# Patient Record
Sex: FEMALE | Race: WHITE | Employment: UNEMPLOYED
[De-identification: names, ages, dates, MRNs, and addresses within clinical notes are randomized per-mention and may not be internally consistent; named-entity substitution may affect disease eponyms.]

---

## 2017-01-24 ENCOUNTER — OFFICE VISIT (OUTPATIENT)
Dept: FAMILY MEDICINE CLINIC | Facility: CLINIC | Age: 52
End: 2017-01-24

## 2017-01-24 VITALS
HEART RATE: 99 BPM | WEIGHT: 159.4 LBS | HEIGHT: 62 IN | DIASTOLIC BLOOD PRESSURE: 65 MMHG | SYSTOLIC BLOOD PRESSURE: 100 MMHG | BODY MASS INDEX: 29.33 KG/M2

## 2017-01-24 DIAGNOSIS — Z72.0 TOBACCO ABUSE: ICD-10-CM

## 2017-01-24 PROCEDURE — 99213 OFFICE O/P EST LOW 20 MIN: CPT | Performed by: FAMILY MEDICINE

## 2017-01-24 RX ORDER — CYCLOBENZAPRINE HCL 5 MG
5 TABLET ORAL 3 TIMES DAILY PRN
Qty: 30 TABLET | Refills: 3 | Status: SHIPPED | OUTPATIENT
Start: 2017-01-24 | End: 2018-01-18 | Stop reason: SDUPTHER

## 2017-01-24 RX ORDER — VALSARTAN AND HYDROCHLOROTHIAZIDE 80; 12.5 MG/1; MG/1
1 TABLET, FILM COATED ORAL DAILY
COMMUNITY
End: 2017-04-07

## 2017-01-24 RX ORDER — ALBUTEROL SULFATE 90 UG/1
2 AEROSOL, METERED RESPIRATORY (INHALATION) 4 TIMES DAILY PRN
Qty: 1 INHALER | Refills: 3 | Status: SHIPPED | OUTPATIENT
Start: 2017-01-24 | End: 2017-07-26 | Stop reason: SDUPTHER

## 2017-01-24 RX ORDER — CYCLOBENZAPRINE HCL 5 MG
5 TABLET ORAL 3 TIMES DAILY PRN
COMMUNITY
End: 2017-01-24 | Stop reason: SDUPTHER

## 2017-02-16 DIAGNOSIS — R51.9 INTRACTABLE HEADACHE, UNSPECIFIED CHRONICITY PATTERN, UNSPECIFIED HEADACHE TYPE: ICD-10-CM

## 2017-02-17 RX ORDER — ACETAMINOPHEN AND CODEINE PHOSPHATE 300; 30 MG/1; MG/1
TABLET ORAL
Qty: 30 TABLET | Refills: 0 | Status: SHIPPED | OUTPATIENT
Start: 2017-02-17 | End: 2017-04-07 | Stop reason: SDUPTHER

## 2017-03-21 ENCOUNTER — OFFICE VISIT (OUTPATIENT)
Dept: FAMILY MEDICINE CLINIC | Age: 52
End: 2017-03-21
Payer: COMMERCIAL

## 2017-03-21 VITALS
OXYGEN SATURATION: 97 % | WEIGHT: 164.4 LBS | HEART RATE: 112 BPM | BODY MASS INDEX: 30.25 KG/M2 | SYSTOLIC BLOOD PRESSURE: 130 MMHG | RESPIRATION RATE: 14 BRPM | HEIGHT: 62 IN | TEMPERATURE: 98.3 F | DIASTOLIC BLOOD PRESSURE: 83 MMHG

## 2017-03-21 DIAGNOSIS — J32.1 CHRONIC FRONTAL SINUSITIS: Primary | ICD-10-CM

## 2017-03-21 DIAGNOSIS — Z72.0 TOBACCO ABUSE: ICD-10-CM

## 2017-03-21 PROCEDURE — 99214 OFFICE O/P EST MOD 30 MIN: CPT | Performed by: FAMILY MEDICINE

## 2017-03-21 RX ORDER — CLARITHROMYCIN 500 MG/1
500 TABLET, COATED ORAL 2 TIMES DAILY
Qty: 20 TABLET | Refills: 0 | Status: SHIPPED | OUTPATIENT
Start: 2017-03-21 | End: 2017-03-31

## 2017-03-21 RX ORDER — PREDNISONE 20 MG/1
40 TABLET ORAL DAILY
Qty: 10 TABLET | Refills: 0 | Status: SHIPPED | OUTPATIENT
Start: 2017-03-21 | End: 2017-03-26

## 2017-04-06 ENCOUNTER — OFFICE VISIT (OUTPATIENT)
Dept: FAMILY MEDICINE CLINIC | Age: 52
End: 2017-04-06
Payer: COMMERCIAL

## 2017-04-06 VITALS
BODY MASS INDEX: 30.36 KG/M2 | HEIGHT: 62 IN | SYSTOLIC BLOOD PRESSURE: 111 MMHG | HEART RATE: 103 BPM | RESPIRATION RATE: 20 BRPM | DIASTOLIC BLOOD PRESSURE: 73 MMHG | OXYGEN SATURATION: 99 % | WEIGHT: 165 LBS

## 2017-04-06 DIAGNOSIS — I10 ESSENTIAL HYPERTENSION: Primary | ICD-10-CM

## 2017-04-06 DIAGNOSIS — R00.0 TACHYCARDIA: ICD-10-CM

## 2017-04-06 DIAGNOSIS — R09.81 NASAL CONGESTION: ICD-10-CM

## 2017-04-06 PROCEDURE — 99214 OFFICE O/P EST MOD 30 MIN: CPT | Performed by: FAMILY MEDICINE

## 2017-04-06 PROCEDURE — 93000 ELECTROCARDIOGRAM COMPLETE: CPT | Performed by: FAMILY MEDICINE

## 2017-04-06 RX ORDER — MONTELUKAST SODIUM 10 MG/1
10 TABLET ORAL NIGHTLY
Qty: 30 TABLET | Refills: 3 | Status: SHIPPED | OUTPATIENT
Start: 2017-04-06 | End: 2017-07-26 | Stop reason: SDUPTHER

## 2017-04-06 RX ORDER — METOPROLOL SUCCINATE 25 MG/1
25 TABLET, EXTENDED RELEASE ORAL DAILY
Qty: 30 TABLET | Refills: 3 | Status: SHIPPED | OUTPATIENT
Start: 2017-04-06 | End: 2017-07-26

## 2017-04-07 DIAGNOSIS — I10 ESSENTIAL HYPERTENSION: ICD-10-CM

## 2017-04-07 DIAGNOSIS — R51.9 INTRACTABLE HEADACHE, UNSPECIFIED CHRONICITY PATTERN, UNSPECIFIED HEADACHE TYPE: ICD-10-CM

## 2017-04-07 RX ORDER — VALSARTAN AND HYDROCHLOROTHIAZIDE 80; 12.5 MG/1; MG/1
TABLET, FILM COATED ORAL
Qty: 30 TABLET | Refills: 2 | Status: SHIPPED | OUTPATIENT
Start: 2017-04-07 | End: 2017-07-21 | Stop reason: SDUPTHER

## 2017-04-10 RX ORDER — ACETAMINOPHEN AND CODEINE PHOSPHATE 300; 30 MG/1; MG/1
TABLET ORAL
Qty: 30 TABLET | Refills: 0 | Status: SHIPPED | OUTPATIENT
Start: 2017-04-10 | End: 2017-05-31 | Stop reason: SDUPTHER

## 2017-04-18 RX ORDER — ATORVASTATIN CALCIUM 10 MG/1
TABLET, FILM COATED ORAL
Qty: 30 TABLET | Refills: 2 | Status: SHIPPED | OUTPATIENT
Start: 2017-04-18 | End: 2017-09-19 | Stop reason: SDUPTHER

## 2017-05-31 DIAGNOSIS — R51.9 INTRACTABLE HEADACHE, UNSPECIFIED CHRONICITY PATTERN, UNSPECIFIED HEADACHE TYPE: ICD-10-CM

## 2017-05-31 RX ORDER — ACETAMINOPHEN AND CODEINE PHOSPHATE 300; 30 MG/1; MG/1
TABLET ORAL
Qty: 30 TABLET | Refills: 0 | Status: SHIPPED | OUTPATIENT
Start: 2017-05-31 | End: 2017-07-26 | Stop reason: SDUPTHER

## 2017-07-21 DIAGNOSIS — I10 ESSENTIAL HYPERTENSION: ICD-10-CM

## 2017-07-21 RX ORDER — VALSARTAN AND HYDROCHLOROTHIAZIDE 80; 12.5 MG/1; MG/1
1 TABLET, FILM COATED ORAL DAILY
Qty: 30 TABLET | Refills: 3 | Status: SHIPPED | OUTPATIENT
Start: 2017-07-21 | End: 2017-11-23 | Stop reason: SDUPTHER

## 2017-07-26 ENCOUNTER — OFFICE VISIT (OUTPATIENT)
Dept: FAMILY MEDICINE CLINIC | Age: 52
End: 2017-07-26
Payer: COMMERCIAL

## 2017-07-26 VITALS
HEIGHT: 62 IN | SYSTOLIC BLOOD PRESSURE: 121 MMHG | WEIGHT: 159.2 LBS | BODY MASS INDEX: 29.3 KG/M2 | OXYGEN SATURATION: 98 % | RESPIRATION RATE: 12 BRPM | HEART RATE: 93 BPM | DIASTOLIC BLOOD PRESSURE: 77 MMHG

## 2017-07-26 DIAGNOSIS — R51.9 INTRACTABLE HEADACHE, UNSPECIFIED CHRONICITY PATTERN, UNSPECIFIED HEADACHE TYPE: ICD-10-CM

## 2017-07-26 DIAGNOSIS — Z72.0 TOBACCO ABUSE: ICD-10-CM

## 2017-07-26 DIAGNOSIS — I10 ESSENTIAL HYPERTENSION: Primary | ICD-10-CM

## 2017-07-26 DIAGNOSIS — R09.81 NASAL CONGESTION: ICD-10-CM

## 2017-07-26 PROCEDURE — 99214 OFFICE O/P EST MOD 30 MIN: CPT | Performed by: FAMILY MEDICINE

## 2017-07-26 RX ORDER — ALBUTEROL SULFATE 90 UG/1
2 AEROSOL, METERED RESPIRATORY (INHALATION) 4 TIMES DAILY PRN
Qty: 1 INHALER | Refills: 3 | Status: SHIPPED | OUTPATIENT
Start: 2017-07-26 | End: 2018-08-09 | Stop reason: SDUPTHER

## 2017-07-26 RX ORDER — MONTELUKAST SODIUM 10 MG/1
10 TABLET ORAL NIGHTLY
Qty: 30 TABLET | Refills: 3 | Status: SHIPPED | OUTPATIENT
Start: 2017-07-26 | End: 2017-12-26 | Stop reason: SDUPTHER

## 2017-07-26 RX ORDER — ACETAMINOPHEN AND CODEINE PHOSPHATE 300; 30 MG/1; MG/1
TABLET ORAL
Qty: 30 TABLET | Refills: 0 | Status: SHIPPED | OUTPATIENT
Start: 2017-07-26 | End: 2017-09-19 | Stop reason: SDUPTHER

## 2017-09-19 DIAGNOSIS — R51.9 INTRACTABLE HEADACHE, UNSPECIFIED CHRONICITY PATTERN, UNSPECIFIED HEADACHE TYPE: ICD-10-CM

## 2017-09-20 RX ORDER — ACETAMINOPHEN AND CODEINE PHOSPHATE 300; 30 MG/1; MG/1
TABLET ORAL
Qty: 30 TABLET | Refills: 0 | Status: SHIPPED | OUTPATIENT
Start: 2017-09-20 | End: 2017-11-23 | Stop reason: SDUPTHER

## 2017-12-26 DIAGNOSIS — R09.81 NASAL CONGESTION: ICD-10-CM

## 2017-12-26 RX ORDER — MONTELUKAST SODIUM 10 MG/1
TABLET ORAL
Qty: 30 TABLET | Refills: 3 | Status: SHIPPED | OUTPATIENT
Start: 2017-12-26 | End: 2018-05-05 | Stop reason: SDUPTHER

## 2018-01-15 RX ORDER — ATORVASTATIN CALCIUM 10 MG/1
TABLET, FILM COATED ORAL
Qty: 90 TABLET | Refills: 2 | Status: SHIPPED | OUTPATIENT
Start: 2018-01-15 | End: 2019-01-08 | Stop reason: SDUPTHER

## 2018-01-18 ENCOUNTER — OFFICE VISIT (OUTPATIENT)
Dept: FAMILY MEDICINE CLINIC | Age: 53
End: 2018-01-18
Payer: COMMERCIAL

## 2018-01-18 VITALS
WEIGHT: 168.8 LBS | SYSTOLIC BLOOD PRESSURE: 112 MMHG | HEIGHT: 62 IN | RESPIRATION RATE: 16 BRPM | DIASTOLIC BLOOD PRESSURE: 70 MMHG | HEART RATE: 98 BPM | BODY MASS INDEX: 31.06 KG/M2 | OXYGEN SATURATION: 100 %

## 2018-01-18 DIAGNOSIS — R10.32 LEFT LOWER QUADRANT ABDOMINAL PAIN OF UNKNOWN ETIOLOGY: Primary | ICD-10-CM

## 2018-01-18 DIAGNOSIS — R51.9 INTRACTABLE HEADACHE, UNSPECIFIED CHRONICITY PATTERN, UNSPECIFIED HEADACHE TYPE: ICD-10-CM

## 2018-01-18 PROCEDURE — 99213 OFFICE O/P EST LOW 20 MIN: CPT | Performed by: FAMILY MEDICINE

## 2018-01-18 RX ORDER — ACETAMINOPHEN AND CODEINE PHOSPHATE 300; 30 MG/1; MG/1
TABLET ORAL
Qty: 20 TABLET | Refills: 0 | Status: SHIPPED | OUTPATIENT
Start: 2018-01-18 | End: 2018-01-18 | Stop reason: CLARIF

## 2018-01-18 RX ORDER — CYCLOBENZAPRINE HCL 5 MG
5 TABLET ORAL 3 TIMES DAILY PRN
Qty: 30 TABLET | Refills: 3 | Status: SHIPPED | OUTPATIENT
Start: 2018-01-18 | End: 2018-11-08 | Stop reason: ALTCHOICE

## 2018-01-18 ASSESSMENT — PATIENT HEALTH QUESTIONNAIRE - PHQ9
SUM OF ALL RESPONSES TO PHQ QUESTIONS 1-9: 0
2. FEELING DOWN, DEPRESSED OR HOPELESS: 0
SUM OF ALL RESPONSES TO PHQ9 QUESTIONS 1 & 2: 0
1. LITTLE INTEREST OR PLEASURE IN DOING THINGS: 0

## 2018-01-19 ENCOUNTER — HOSPITAL ENCOUNTER (OUTPATIENT)
Dept: ULTRASOUND IMAGING | Age: 53
Discharge: HOME | End: 2018-01-19
Payer: COMMERCIAL

## 2018-01-19 DIAGNOSIS — R10.32 LEFT LOWER QUADRANT ABDOMINAL PAIN OF UNKNOWN ETIOLOGY: ICD-10-CM

## 2018-01-19 PROCEDURE — 76856 US EXAM PELVIC COMPLETE: CPT

## 2018-01-24 ENCOUNTER — HOSPITAL ENCOUNTER (OUTPATIENT)
Age: 53
Setting detail: SPECIMEN
Discharge: HOME | End: 2018-01-24
Payer: COMMERCIAL

## 2018-01-24 ENCOUNTER — OFFICE VISIT (OUTPATIENT)
Dept: OBGYN CLINIC | Age: 53
End: 2018-01-24
Payer: COMMERCIAL

## 2018-01-24 VITALS
BODY MASS INDEX: 29.81 KG/M2 | HEIGHT: 63 IN | OXYGEN SATURATION: 99 % | SYSTOLIC BLOOD PRESSURE: 128 MMHG | DIASTOLIC BLOOD PRESSURE: 87 MMHG | WEIGHT: 168.25 LBS | HEART RATE: 107 BPM

## 2018-01-24 DIAGNOSIS — Z01.419 ENCOUNTER FOR ROUTINE GYNECOLOGICAL EXAMINATION WITH PAPANICOLAOU SMEAR OF CERVIX: Primary | ICD-10-CM

## 2018-01-24 DIAGNOSIS — Z12.31 ENCOUNTER FOR SCREENING MAMMOGRAM FOR BREAST CANCER: ICD-10-CM

## 2018-01-24 DIAGNOSIS — R31.29 MICROSCOPIC HEMATURIA: ICD-10-CM

## 2018-01-24 DIAGNOSIS — R10.30 LOWER ABDOMINAL PAIN: ICD-10-CM

## 2018-01-24 DIAGNOSIS — R10.32 LEFT LOWER QUADRANT PAIN: ICD-10-CM

## 2018-01-24 LAB
-: NORMAL
AMORPHOUS: NORMAL
BACTERIA: NORMAL
BILIRUBIN URINE: NEGATIVE
CASTS UA: NORMAL /LPF (ref 0–8)
COLOR: YELLOW
COMMENT UA: ABNORMAL
CRYSTALS, UA: NORMAL /HPF
EPITHELIAL CELLS UA: NORMAL /HPF (ref 0–5)
GLUCOSE URINE: NEGATIVE
KETONES, URINE: NEGATIVE
LEUKOCYTE ESTERASE, URINE: NEGATIVE
MUCUS: NORMAL
NITRITE, URINE: NEGATIVE
OTHER OBSERVATIONS UA: NORMAL
PH UA: 6.5 (ref 5–8)
PROTEIN UA: NEGATIVE
RBC UA: NORMAL /HPF (ref 0–4)
RENAL EPITHELIAL, UA: NORMAL /HPF
SPECIFIC GRAVITY UA: 1 (ref 1–1.03)
TRICHOMONAS: NORMAL
TURBIDITY: CLEAR
URINE HGB: ABNORMAL
UROBILINOGEN, URINE: NORMAL
WBC UA: NORMAL /HPF (ref 0–5)
YEAST: NORMAL

## 2018-01-24 PROCEDURE — 99214 OFFICE O/P EST MOD 30 MIN: CPT | Performed by: OBSTETRICS & GYNECOLOGY

## 2018-01-25 LAB
CULTURE: NORMAL
CULTURE: NORMAL
Lab: NORMAL
SPECIMEN DESCRIPTION: NORMAL
STATUS: NORMAL

## 2018-01-30 NOTE — PROGRESS NOTES
Essentially unremarkable exam.    Previous hysterectomy and right oophorectomy.  Left ovary was not visualized. Thank you.

## 2018-02-02 ENCOUNTER — OFFICE VISIT (OUTPATIENT)
Dept: FAMILY MEDICINE CLINIC | Age: 53
End: 2018-02-02
Payer: COMMERCIAL

## 2018-02-02 ENCOUNTER — HOSPITAL ENCOUNTER (OUTPATIENT)
Age: 53
Setting detail: SPECIMEN
Discharge: HOME | End: 2018-02-02
Payer: COMMERCIAL

## 2018-02-02 ENCOUNTER — HOSPITAL ENCOUNTER (OUTPATIENT)
Dept: CT IMAGING | Facility: CLINIC | Age: 53
Discharge: HOME | End: 2018-02-04
Payer: COMMERCIAL

## 2018-02-02 VITALS
WEIGHT: 167.7 LBS | OXYGEN SATURATION: 98 % | DIASTOLIC BLOOD PRESSURE: 74 MMHG | HEIGHT: 63 IN | BODY MASS INDEX: 29.71 KG/M2 | TEMPERATURE: 97.7 F | HEART RATE: 94 BPM | SYSTOLIC BLOOD PRESSURE: 118 MMHG

## 2018-02-02 DIAGNOSIS — R30.0 DYSURIA: Primary | ICD-10-CM

## 2018-02-02 DIAGNOSIS — R10.32 LEFT LOWER QUADRANT PAIN: ICD-10-CM

## 2018-02-02 DIAGNOSIS — R31.9 HEMATURIA, UNSPECIFIED TYPE: ICD-10-CM

## 2018-02-02 LAB
BILIRUBIN, POC: NORMAL
BLOOD URINE, POC: NORMAL
CLARITY, POC: CLEAR
COLOR, POC: NORMAL
GLUCOSE URINE, POC: NORMAL
KETONES, POC: NORMAL
LEUKOCYTE EST, POC: NORMAL
NITRITE, POC: NORMAL
PH, POC: 6
PROTEIN, POC: NORMAL
SPECIFIC GRAVITY, POC: 1.02
UROBILINOGEN, POC: 0.2

## 2018-02-02 PROCEDURE — 81003 URINALYSIS AUTO W/O SCOPE: CPT | Performed by: NURSE PRACTITIONER

## 2018-02-02 PROCEDURE — 74176 CT ABD & PELVIS W/O CONTRAST: CPT

## 2018-02-02 PROCEDURE — 99213 OFFICE O/P EST LOW 20 MIN: CPT | Performed by: NURSE PRACTITIONER

## 2018-02-02 RX ORDER — SODIUM, POTASSIUM,MAG SULFATES 17.5-3.13G
SOLUTION, RECONSTITUTED, ORAL ORAL
COMMUNITY
Start: 2018-01-31 | End: 2018-02-22

## 2018-02-02 RX ORDER — PHENAZOPYRIDINE HYDROCHLORIDE 100 MG/1
100 TABLET, FILM COATED ORAL 3 TIMES DAILY PRN
Qty: 6 TABLET | Refills: 0 | Status: SHIPPED | OUTPATIENT
Start: 2018-02-02 | End: 2018-02-22

## 2018-02-02 RX ORDER — CIPROFLOXACIN 500 MG/1
500 TABLET, FILM COATED ORAL 2 TIMES DAILY
Qty: 20 TABLET | Refills: 0 | Status: SHIPPED | OUTPATIENT
Start: 2018-02-02 | End: 2018-02-12

## 2018-02-02 ASSESSMENT — ENCOUNTER SYMPTOMS
SHORTNESS OF BREATH: 0
DIARRHEA: 0
COUGH: 0
VOMITING: 0
NAUSEA: 0
ABDOMINAL PAIN: 0
CHEST TIGHTNESS: 0
WHEEZING: 0

## 2018-02-02 NOTE — PROGRESS NOTES
[id number] [place] [id number]  Dept: [phone]  Dept Fax: [phone]    Jhonny Keane is a 46 y.o. female who presents to the walk in clinic today for her medical conditions/complaints as noted below. Jhonny Keane is c/o of Urinary Tract Infection (pressure today)    HPI:     Patient presents to the walk in clinic for an evaluation. Reports she thinks she might have a UTI that is causing pressure today. Has been having LLQ abdominal pain for approximately 2 weeks. Was seen by PCP on 1/18 a pelvic exam was done that day and an ultrasound of the pelvic was ordered. Pelvis ultrasound was normal. Patient was referred to [facility] [place] and saw them on 1/24. A 2nd pelvic exam was completed this day and a CT scan of the abdomen and pelvis, urinalysis, and urine culture was ordered. U/A showed trace blood, culture showed no bacterial growth. Ct of abdomen and pelvic was completed today, which showed no acute abnormality. Has been referred to urology for the hematuria and is schedule to see them on Tuesday 2/6/18. Urinary Tract Infection    This is a new problem. The current episode started today. The problem has been gradually worsening. The pain is at a severity of 10/10. There has been no fever. Sexually active: not currently. There is no history of pyelonephritis. Associated symptoms include chills, flank pain (left), frequency and urgency. Pertinent negatives include no discharge, hematuria, hesitancy, nausea, possible pregnancy, sweats or vomiting. She has tried nothing for the symptoms.      Past Medical History:   Diagnosis Date    Anxiety     Asthma     Headache     Hyperlipidemia     Hypertension       Current Outpatient Prescriptions   Medication Sig Dispense Refill    ciprofloxacin (CIPRO) 500 MG tablet Take 1 tablet by mouth 2 times daily for 10 days 20 tablet 0    phenazopyridine (PYRIDIUM) 100 MG tablet Take 1 tablet by mouth 3 times person, place, and time. She appears well-developed and well-nourished. No distress. Cardiovascular: Normal rate, regular rhythm and normal heart sounds. Pulmonary/Chest: Effort normal and breath sounds normal. No respiratory distress. She has no wheezes. Abdominal: Soft. There is tenderness in the suprapubic area. There is CVA tenderness (left). There is no rigidity, no rebound and no guarding. Patient extremely uncomfortable when suprapubic area and left flank area is palpated. Neurological: She is alert and oriented to person, place, and time. Skin: Skin is warm and dry. She is not diaphoretic. Nursing note and vitals reviewed. /74   Pulse 94   Temp 97.7 °F (36.5 °C) (Oral)   Ht 5' 3\" (1.6 m)   Wt 167 lb 11.2 oz (76.1 kg)   SpO2 98%   BMI 29.71 kg/m²     Results for orders placed or performed in visit on 02/02/18   POCT Urinalysis No Micro (Auto)   Result Value Ref Range    Color, UA dark yellow     Clarity, UA clear     Glucose, UA POC neg     Bilirubin, UA neg     Ketones, UA neg     Spec Grav, UA 1.020     Blood, UA POC small     pH, UA 6.0     Protein, UA POC neg     Urobilinogen, UA 0.2     Leukocytes, UA neg     Nitrite, UA neg      Assessment:      1. Dysuria  POCT Urinalysis No Micro (Auto)    Urine Culture    ciprofloxacin (CIPRO) 500 MG tablet    phenazopyridine (PYRIDIUM) 100 MG tablet   2. Hematuria, unspecified type  Urine Culture    ciprofloxacin (CIPRO) 500 MG tablet    phenazopyridine (PYRIDIUM) 100 MG tablet     Plan:      Return if symptoms worsen or fail to improve. Orders Placed This Encounter   Medications    ciprofloxacin (CIPRO) 500 MG tablet     Sig: Take 1 tablet by mouth 2 times daily for 10 days     Dispense:  20 tablet     Refill:  0    phenazopyridine (PYRIDIUM) 100 MG tablet     Sig: Take 1 tablet by mouth 3 times daily as needed for Pain     Dispense:  6 tablet     Refill:  0     In office urinalysis completed.  Results discussed with patient during the appointment. Urine will be sent for a culture. Further orders pending results. Patient will be notified of results. Given the patient has had 2 pelvic exams in the last 2 weeks and no changes in the symptoms I do not feel it is necessary to repeat the pelvic exam during this appointment. Discussed with patient at this point given the severe left flank pain lets go ahead and try an antibiotic for the symptoms and if the culture comes back with no bacterial growth we will discontinue the antibiotic. Patient agreeable to this plan. Cipro as directed. Pyridium as directed. Instructed pyridium or any OTC Azo type product cannot be used after Sunday night so it does not mess up any urine specimen the urologist might want to obtain on Tuesday. Patient verbalizes understanding. Plenty of fluids, instructed to avoid bladder irritants such as caffeine. Instructed to keep already schedule appointment with urology on Tuesday  Follow up with PCP or return to walk in clinic if symptoms do not improve or worsen. Patient given educational materials - see patient instructions. Discussed use, benefit, and side effects of prescribed medications. All patient questions answered. Pt voiced understanding.     Electronically signed by Payal Huggins CNP on 2/2/2018 at 2:09 PM

## 2018-02-06 LAB
CULTURE: NORMAL
CULTURE: NORMAL
Lab: NORMAL
SPECIMEN DESCRIPTION: NORMAL
STATUS: NORMAL

## 2018-02-13 ENCOUNTER — HOSPITAL ENCOUNTER (OUTPATIENT)
Dept: ULTRASOUND IMAGING | Age: 53
Discharge: HOME | End: 2018-02-15
Payer: COMMERCIAL

## 2018-02-13 ENCOUNTER — HOSPITAL ENCOUNTER (OUTPATIENT)
Age: 53
Discharge: HOME | End: 2018-02-13
Payer: COMMERCIAL

## 2018-02-13 DIAGNOSIS — Z01.811 PRE-OP CHEST EXAM: Primary | ICD-10-CM

## 2018-02-13 DIAGNOSIS — R31.29 OTHER MICROSCOPIC HEMATURIA: ICD-10-CM

## 2018-02-13 LAB
EKG ATRIAL RATE: 98 BPM
EKG P AXIS: 73 DEGREES
EKG P-R INTERVAL: 160 MS
EKG Q-T INTERVAL: 336 MS
EKG QRS DURATION: 76 MS
EKG QTC CALCULATION (BAZETT): 428 MS
EKG R AXIS: 78 DEGREES
EKG T AXIS: 62 DEGREES
EKG VENTRICULAR RATE: 98 BPM

## 2018-02-13 PROCEDURE — 93005 ELECTROCARDIOGRAM TRACING: CPT

## 2018-02-13 PROCEDURE — 76770 US EXAM ABDO BACK WALL COMP: CPT

## 2018-02-22 ENCOUNTER — HOSPITAL ENCOUNTER (EMERGENCY)
Age: 53
Discharge: HOME | End: 2018-02-22
Attending: EMERGENCY MEDICINE
Payer: COMMERCIAL

## 2018-02-22 ENCOUNTER — TELEPHONE (OUTPATIENT)
Dept: FAMILY MEDICINE CLINIC | Age: 53
End: 2018-02-22

## 2018-02-22 VITALS
OXYGEN SATURATION: 98 % | DIASTOLIC BLOOD PRESSURE: 87 MMHG | TEMPERATURE: 98 F | WEIGHT: 168 LBS | HEART RATE: 82 BPM | SYSTOLIC BLOOD PRESSURE: 148 MMHG | HEIGHT: 63 IN | RESPIRATION RATE: 17 BRPM | BODY MASS INDEX: 29.77 KG/M2

## 2018-02-22 DIAGNOSIS — M54.50 ACUTE RIGHT-SIDED LOW BACK PAIN WITHOUT SCIATICA: Primary | ICD-10-CM

## 2018-02-22 LAB
-: ABNORMAL
ABSOLUTE EOS #: 0.18 K/UL (ref 0–0.44)
ABSOLUTE IMMATURE GRANULOCYTE: <0.03 K/UL (ref 0–0.3)
ABSOLUTE LYMPH #: 2.18 K/UL (ref 1.1–3.7)
ABSOLUTE MONO #: 0.59 K/UL (ref 0.1–1.2)
ALBUMIN SERPL-MCNC: 4.8 G/DL (ref 3.5–5.2)
ALBUMIN/GLOBULIN RATIO: 1.7 (ref 1–2.5)
ALP BLD-CCNC: 74 U/L (ref 35–104)
ALT SERPL-CCNC: 28 U/L (ref 5–33)
AMORPHOUS: ABNORMAL
ANION GAP SERPL CALCULATED.3IONS-SCNC: 16 MMOL/L (ref 9–17)
AST SERPL-CCNC: 24 U/L
BACTERIA: ABNORMAL
BASOPHILS # BLD: 0 % (ref 0–2)
BASOPHILS ABSOLUTE: <0.03 K/UL (ref 0–0.2)
BILIRUB SERPL-MCNC: 0.87 MG/DL (ref 0.3–1.2)
BILIRUBIN URINE: NEGATIVE
BUN BLDV-MCNC: 13 MG/DL (ref 6–20)
BUN/CREAT BLD: ABNORMAL (ref 9–20)
CALCIUM SERPL-MCNC: 9.6 MG/DL (ref 8.6–10.4)
CASTS UA: ABNORMAL /LPF (ref 0–2)
CHLORIDE BLD-SCNC: 98 MMOL/L (ref 98–107)
CO2: 26 MMOL/L (ref 20–31)
COLOR: YELLOW
CREAT SERPL-MCNC: 0.44 MG/DL (ref 0.5–0.9)
CRYSTALS, UA: ABNORMAL /HPF
DIFFERENTIAL TYPE: ABNORMAL
EOSINOPHILS RELATIVE PERCENT: 2 % (ref 1–4)
EPITHELIAL CELLS UA: ABNORMAL /HPF (ref 0–5)
GFR AFRICAN AMERICAN: >60 ML/MIN
GFR NON-AFRICAN AMERICAN: >60 ML/MIN
GFR SERPL CREATININE-BSD FRML MDRD: ABNORMAL ML/MIN/{1.73_M2}
GFR SERPL CREATININE-BSD FRML MDRD: ABNORMAL ML/MIN/{1.73_M2}
GLUCOSE BLD-MCNC: 91 MG/DL (ref 70–99)
GLUCOSE URINE: NEGATIVE
HCG(URINE) PREGNANCY TEST: NEGATIVE
HCT VFR BLD CALC: 48.2 % (ref 36.3–47.1)
HEMOGLOBIN: 15.7 G/DL (ref 11.9–15.1)
IMMATURE GRANULOCYTES: 0 %
KETONES, URINE: NEGATIVE
LEUKOCYTE ESTERASE, URINE: NEGATIVE
LYMPHOCYTES # BLD: 27 % (ref 24–43)
MCH RBC QN AUTO: 32 PG (ref 25.2–33.5)
MCHC RBC AUTO-ENTMCNC: 32.6 G/DL (ref 28.4–34.8)
MCV RBC AUTO: 98.2 FL (ref 82.6–102.9)
MONOCYTES # BLD: 7 % (ref 3–12)
MUCUS: ABNORMAL
NITRITE, URINE: NEGATIVE
NRBC AUTOMATED: 0 PER 100 WBC
OTHER OBSERVATIONS UA: ABNORMAL
PDW BLD-RTO: 13.1 % (ref 11.8–14.4)
PH UA: 6 (ref 5–8)
PLATELET # BLD: ABNORMAL K/UL (ref 138–453)
PLATELET ESTIMATE: ABNORMAL
PLATELET, FLUORESCENCE: NORMAL K/UL (ref 138–453)
PLATELET, IMMATURE FRACTION: NORMAL % (ref 1.1–10.3)
PMV BLD AUTO: ABNORMAL FL (ref 8.1–13.5)
POTASSIUM SERPL-SCNC: 3.5 MMOL/L (ref 3.7–5.3)
PROTEIN UA: NEGATIVE
RBC # BLD: 4.91 M/UL (ref 3.95–5.11)
RBC # BLD: ABNORMAL 10*6/UL
RBC UA: ABNORMAL /HPF (ref 0–2)
RENAL EPITHELIAL, UA: ABNORMAL /HPF
SEG NEUTROPHILS: 64 % (ref 36–65)
SEGMENTED NEUTROPHILS ABSOLUTE COUNT: 5.23 K/UL (ref 1.5–8.1)
SODIUM BLD-SCNC: 140 MMOL/L (ref 135–144)
SPECIFIC GRAVITY UA: 1.01 (ref 1–1.03)
TOTAL PROTEIN: 7.6 G/DL (ref 6.4–8.3)
TRICHOMONAS: ABNORMAL
TURBIDITY: CLEAR
URINE HGB: ABNORMAL
UROBILINOGEN, URINE: NORMAL
WBC # BLD: 8.2 K/UL (ref 3.5–11.3)
WBC # BLD: ABNORMAL 10*3/UL
WBC UA: ABNORMAL /HPF (ref 0–5)
YEAST: ABNORMAL

## 2018-02-22 PROCEDURE — 96374 THER/PROPH/DIAG INJ IV PUSH: CPT

## 2018-02-22 PROCEDURE — 80053 COMPREHEN METABOLIC PANEL: CPT

## 2018-02-22 PROCEDURE — 99283 EMERGENCY DEPT VISIT LOW MDM: CPT

## 2018-02-22 PROCEDURE — 87086 URINE CULTURE/COLONY COUNT: CPT

## 2018-02-22 PROCEDURE — 85055 RETICULATED PLATELET ASSAY: CPT

## 2018-02-22 PROCEDURE — 85025 COMPLETE CBC W/AUTO DIFF WBC: CPT

## 2018-02-22 PROCEDURE — 6360000002 HC RX W HCPCS: Performed by: EMERGENCY MEDICINE

## 2018-02-22 PROCEDURE — 6370000000 HC RX 637 (ALT 250 FOR IP): Performed by: EMERGENCY MEDICINE

## 2018-02-22 PROCEDURE — 84703 CHORIONIC GONADOTROPIN ASSAY: CPT

## 2018-02-22 PROCEDURE — 81001 URINALYSIS AUTO W/SCOPE: CPT

## 2018-02-22 RX ORDER — DIAZEPAM 5 MG/1
5 TABLET ORAL EVERY 12 HOURS PRN
Qty: 10 TABLET | Refills: 0 | Status: SHIPPED | OUTPATIENT
Start: 2018-02-22 | End: 2018-02-27

## 2018-02-22 RX ORDER — KETOROLAC TROMETHAMINE 30 MG/ML
30 INJECTION, SOLUTION INTRAMUSCULAR; INTRAVENOUS ONCE
Status: COMPLETED | OUTPATIENT
Start: 2018-02-22 | End: 2018-02-22

## 2018-02-22 RX ORDER — DIAZEPAM 5 MG/ML
5 INJECTION, SOLUTION INTRAMUSCULAR; INTRAVENOUS ONCE
Status: DISCONTINUED | OUTPATIENT
Start: 2018-02-22 | End: 2018-02-22

## 2018-02-22 RX ORDER — OXYCODONE HYDROCHLORIDE AND ACETAMINOPHEN 5; 325 MG/1; MG/1
1 TABLET ORAL ONCE
Status: COMPLETED | OUTPATIENT
Start: 2018-02-22 | End: 2018-02-22

## 2018-02-22 RX ORDER — DIAZEPAM 5 MG/1
5 TABLET ORAL ONCE
Status: COMPLETED | OUTPATIENT
Start: 2018-02-22 | End: 2018-02-22

## 2018-02-22 RX ORDER — LORAZEPAM 2 MG/ML
5 INJECTION INTRAMUSCULAR ONCE
Status: DISCONTINUED | OUTPATIENT
Start: 2018-02-22 | End: 2018-02-22

## 2018-02-22 RX ORDER — DIAZEPAM 5 MG/1
5 TABLET ORAL ONCE
Status: DISCONTINUED | OUTPATIENT
Start: 2018-02-22 | End: 2018-02-22

## 2018-02-22 RX ADMIN — OXYCODONE HYDROCHLORIDE AND ACETAMINOPHEN 1 TABLET: 5; 325 TABLET ORAL at 15:28

## 2018-02-22 RX ADMIN — DIAZEPAM 5 MG: 5 TABLET ORAL at 14:16

## 2018-02-22 RX ADMIN — KETOROLAC TROMETHAMINE 30 MG: 30 INJECTION, SOLUTION INTRAMUSCULAR at 14:08

## 2018-02-22 ASSESSMENT — ENCOUNTER SYMPTOMS
COUGH: 0
SHORTNESS OF BREATH: 0
RHINORRHEA: 0
COLOR CHANGE: 0
EYE PAIN: 0
ABDOMINAL PAIN: 0
BACK PAIN: 1
SORE THROAT: 0
NAUSEA: 0
VOMITING: 0

## 2018-02-22 ASSESSMENT — PAIN DESCRIPTION - ORIENTATION: ORIENTATION: RIGHT

## 2018-02-22 ASSESSMENT — PAIN SCALES - GENERAL
PAINLEVEL_OUTOF10: 10
PAINLEVEL_OUTOF10: 10
PAINLEVEL_OUTOF10: 7

## 2018-02-22 ASSESSMENT — PAIN SCALES - WONG BAKER: WONGBAKER_NUMERICALRESPONSE: 0

## 2018-02-22 ASSESSMENT — PAIN DESCRIPTION - PAIN TYPE: TYPE: ACUTE PAIN

## 2018-02-22 ASSESSMENT — PAIN DESCRIPTION - DESCRIPTORS: DESCRIPTORS: SHOOTING;ACHING

## 2018-02-22 ASSESSMENT — PAIN DESCRIPTION - FREQUENCY: FREQUENCY: CONTINUOUS

## 2018-02-22 ASSESSMENT — PAIN DESCRIPTION - LOCATION: LOCATION: FLANK

## 2018-02-22 NOTE — ED PROVIDER NOTES
Psychiatric/Behavioral: Negative for behavioral problems and dysphoric mood. PHYSICAL EXAM   (up to 7 for level 4, 8 or more for level 5)      INITIAL VITALS:   BP (!) 148/87   Pulse 82   Temp 98 °F (36.7 °C) (Oral)   Resp 17   Ht 5' 3\" (1.6 m)   Wt 168 lb (76.2 kg)   SpO2 98%   BMI 29.76 kg/m²     Physical Exam   Constitutional: She is oriented to person, place, and time. She appears well-developed and well-nourished. No distress. HENT:   Head: Normocephalic and atraumatic. Mouth/Throat: Oropharynx is clear and moist.   Eyes: EOM are normal. Pupils are equal, round, and reactive to light. Neck: Normal range of motion. Cardiovascular: Normal rate and regular rhythm. Pulmonary/Chest: Effort normal. She has no wheezes. She has no rales. Abdominal: Soft. There is no tenderness. There is no rebound and no guarding. Musculoskeletal: Normal range of motion. Tenderness palpation in the right lower lumbar region; no midline tenderness to palpation; no step-offs or deformities felt; able to ambulate without difficulty; no gross motor or sensory deficits of upper or lower extremities   Neurological: She is alert and oriented to person, place, and time. She has normal strength. No sensory deficit. GCS eye subscore is 4. GCS verbal subscore is 5. GCS motor subscore is 6. Skin: Skin is warm and dry.    Psychiatric: Her behavior is normal.       DIFFERENTIAL  DIAGNOSIS     PLAN (LABS / IMAGING / EKG):  Orders Placed This Encounter   Procedures    URINE CULTURE CLEAN CATCH    CBC Auto Differential    Comprehensive Metabolic Panel    URINALYSIS WITH MICROSCOPIC    Pregnancy, Urine    Immature Platelet Fraction    Inpatient consult to Internal Medicine    Insert peripheral IV       MEDICATIONS ORDERED:  Orders Placed This Encounter   Medications    DISCONTD: diazepam (VALIUM) tablet 5 mg    DISCONTD: diazepam (VALIUM) injection 5 mg    DISCONTD: LORazepam (ATIVAN) injection 5 mg    ketorolac (TORADOL) injection 30 mg    DISCONTD: diazepam (VALIUM) injection 5 mg    diazepam (VALIUM) tablet 5 mg    diazepam (VALIUM) 5 MG tablet     Sig: Take 1 tablet by mouth every 12 hours as needed for Anxiety (pain, spasm) for up to 5 days.      Dispense:  10 tablet     Refill:  0    oxyCODONE-acetaminophen (PERCOCET) 5-325 MG per tablet 1 tablet       DIAGNOSTIC RESULTS / EMERGENCY DEPARTMENT COURSE / MDM     LABS:  Results for orders placed or performed during the hospital encounter of 02/22/18   CBC Auto Differential   Result Value Ref Range    WBC 8.2 3.5 - 11.3 k/uL    RBC 4.91 3.95 - 5.11 m/uL    Hemoglobin 15.7 (H) 11.9 - 15.1 g/dL    Hematocrit 48.2 (H) 36.3 - 47.1 %    MCV 98.2 82.6 - 102.9 fL    MCH 32.0 25.2 - 33.5 pg    MCHC 32.6 28.4 - 34.8 g/dL    RDW 13.1 11.8 - 14.4 %    Platelets See Reflexed IPF Result 138 - 453 k/uL    MPV NOT REPORTED 8.1 - 13.5 fL    NRBC Automated 0.0 0.0 per 100 WBC    Differential Type NOT REPORTED     WBC Morphology NOT REPORTED     RBC Morphology NOT REPORTED     Platelet Estimate NOT REPORTED     Seg Neutrophils 64 36 - 65 %    Lymphocytes 27 24 - 43 %    Monocytes 7 3 - 12 %    Eosinophils % 2 1 - 4 %    Basophils 0 0 - 2 %    Immature Granulocytes 0 0 %    Segs Absolute 5.23 1.50 - 8.10 k/uL    Absolute Lymph # 2.18 1.10 - 3.70 k/uL    Absolute Mono # 0.59 0.10 - 1.20 k/uL    Absolute Eos # 0.18 0.00 - 0.44 k/uL    Basophils # <0.03 0.00 - 0.20 k/uL    Absolute Immature Granulocyte <0.03 0.00 - 0.30 k/uL   Comprehensive Metabolic Panel   Result Value Ref Range    Glucose 91 70 - 99 mg/dL    BUN 13 6 - 20 mg/dL    CREATININE 0.44 (L) 0.50 - 0.90 mg/dL    Bun/Cre Ratio NOT REPORTED 9 - 20    Calcium 9.6 8.6 - 10.4 mg/dL    Sodium 140 135 - 144 mmol/L    Potassium 3.5 (L) 3.7 - 5.3 mmol/L    Chloride 98 98 - 107 mmol/L    CO2 26 20 - 31 mmol/L    Anion Gap 16 9 - 17 mmol/L    Alkaline Phosphatase 74 35 - 104 U/L    ALT 28 5 - 33 U/L    AST 24 <32 U/L    Total Bilirubin 0.87 0.3 - 1.2 mg/dL    Total Protein 7.6 6.4 - 8.3 g/dL    Alb 4.8 3.5 - 5.2 g/dL    Albumin/Globulin Ratio 1.7 1.0 - 2.5    GFR Non-African American >60 >60 mL/min    GFR African American >60 >60 mL/min    GFR Comment          GFR Staging NOT REPORTED    URINALYSIS WITH MICROSCOPIC   Result Value Ref Range    Color, UA YELLOW YEL    Turbidity UA CLEAR CLEAR    Glucose, Ur NEGATIVE NEG    Bilirubin Urine NEGATIVE NEG    Ketones, Urine NEGATIVE NEG    Specific Gravity, UA 1.008 1.005 - 1.030    Urine Hgb SMALL (A) NEG    pH, UA 6.0 5.0 - 8.0    Protein, UA NEGATIVE NEG    Urobilinogen, Urine Normal NORM    Nitrite, Urine NEGATIVE NEG    Leukocyte Esterase, Urine NEGATIVE NEG    -          WBC, UA None 0 - 5 /HPF    RBC, UA 0 TO 2 0 - 2 /HPF    Casts UA NOT REPORTED 0 - 2 /LPF    Crystals UA NOT REPORTED NONE /HPF    Epithelial Cells UA 2 TO 5 0 - 5 /HPF    Renal Epithelial, Urine NOT REPORTED 0 /HPF    Bacteria, UA MODERATE (A) NONE    Mucus, UA NOT REPORTED NONE    Trichomonas, UA NOT REPORTED NONE    Amorphous, UA NOT REPORTED NONE    Other Observations UA NOT REPORTED NREQ    Yeast, UA NOT REPORTED NONE   Pregnancy, Urine   Result Value Ref Range    HCG(Urine) Pregnancy Test NEGATIVE NEG   Immature Platelet Fraction   Result Value Ref Range    Platelet, Immature Fraction NOT REPORTED 1.1 - 10.3 %    Platelet, Fluorescence Platelet clumps present, count appears adequate. 138 - 453 k/uL       IMPRESSION: Patient presents with right lower back pain ongoing for the past few days. Differentials include muscle strain, muscle spasm, nephrolithiasis, UTI. On exam, patient's afebrile and hemodynamically stable. She is nontoxic in appearance. Abdomen is soft and nontender. She was ambulate without difficulty. She has a little bit of Yesenia Rowels presents with tenderness in the right lower back. No gross motor sensory deficits. She is neurologically intact.   Given her presentation, we'll obtain a urinalysis, some abdominal labs. No need to repeat CT as her CT a few weeks ago was negative for any acute processes. Low suspicion for colitis or enteritis. We'll treated with appropriate analgesics and reassess. RADIOLOGY:  None    EKG  None    All EKG's are interpreted by the Emergency Department Physician who either signs or Co-signs this chart in the absence of a cardiologist.    EMERGENCY DEPARTMENT COURSE:  Patient was updated on lab work. When reassessed, stated that she felt moderately improved. She was able to cannulate without difficulty. I advised that she touch base with her PCP. Discussed that if symptoms worsen or do not improve, that she should return to the ED. Patient demonstrated her understanding and is agreeable with the plan. Patient is stable for discharge. PROCEDURES:  None    CONSULTS:  IP CONSULT TO INTERNAL MEDICINE    CRITICAL CARE:  None    FINAL IMPRESSION      1. Acute right-sided low back pain without sciatica          DISPOSITION / PLAN     DISPOSITION Decision To Discharge 02/22/2018 03:14:17 PM      PATIENT REFERRED TO:  Maxx Irwin MD  [address] [place] [id number]    Call today        DISCHARGE MEDICATIONS:  Discharge Medication List as of 2/22/2018  3:17 PM      START taking these medications    Details   diazepam (VALIUM) 5 MG tablet Take 1 tablet by mouth every 12 hours as needed for Anxiety (pain, spasm) for up to 5 days. , Disp-10 tablet, R-0Print             Alcon Amezquita MD  Emergency Medicine Resident    (Please note that portions of this note were completed with a voice recognition program.  Efforts were made to edit the dictations but occasionally words are mis-transcribed.)       Alcon Amezquita MD  Resident  02/22/18 [id number]

## 2018-02-22 NOTE — TELEPHONE ENCOUNTER
Please contact eLti Jefferson at [phone] and let her know what the plan is. Surgery is scheduled tomorrow.

## 2018-02-22 NOTE — ED TRIAGE NOTES
Pt states she started having right flank pain on Monday that has progressively gotten worse. Pt states she thought she perhaps pulled a muscle after playing with her grandkids. Pt states the pain has progressed and she was going to have a procedure tomorrow but it was canceled due to an abnormal EKG so the pt states she came here because she can't take the pain.

## 2018-02-23 ENCOUNTER — OFFICE VISIT (OUTPATIENT)
Dept: FAMILY MEDICINE CLINIC | Age: 53
End: 2018-02-23
Payer: COMMERCIAL

## 2018-02-23 VITALS
HEART RATE: 92 BPM | BODY MASS INDEX: 29.77 KG/M2 | WEIGHT: 168 LBS | DIASTOLIC BLOOD PRESSURE: 68 MMHG | SYSTOLIC BLOOD PRESSURE: 115 MMHG | HEIGHT: 63 IN

## 2018-02-23 DIAGNOSIS — M54.50 ACUTE RIGHT-SIDED LOW BACK PAIN WITHOUT SCIATICA: Primary | ICD-10-CM

## 2018-02-23 DIAGNOSIS — R94.31 EKG ABNORMALITIES: ICD-10-CM

## 2018-02-23 DIAGNOSIS — M54.16 ACUTE RIGHT LUMBAR RADICULOPATHY: Primary | ICD-10-CM

## 2018-02-23 DIAGNOSIS — M54.16 ACUTE RIGHT LUMBAR RADICULOPATHY: ICD-10-CM

## 2018-02-23 LAB
CULTURE: NORMAL
CULTURE: NORMAL
Lab: NORMAL
SPECIMEN DESCRIPTION: NORMAL
STATUS: NORMAL

## 2018-02-23 PROCEDURE — 99213 OFFICE O/P EST LOW 20 MIN: CPT | Performed by: FAMILY MEDICINE

## 2018-02-23 RX ORDER — OXYCODONE HYDROCHLORIDE AND ACETAMINOPHEN 5; 325 MG/1; MG/1
1 TABLET ORAL 2 TIMES DAILY
Qty: 14 TABLET | Refills: 0 | Status: SHIPPED | OUTPATIENT
Start: 2018-02-23 | End: 2018-03-02

## 2018-02-23 NOTE — PROGRESS NOTES
General FM note    Paris Valdes is a 46 y.o. female who presents today for follow up on her  medical conditions as noted below. Paris Valdes is c/o of   Chief Complaint   Patient presents with    ED Follow-up     Seen at [facility] on 2/22/18       Patient Active Problem List:     H/O severe sun exposure     Chronic tension-type headache, intractable     Essential hypertension     Microscopic hematuria     Lower abdominal pain     Past Medical History:   Diagnosis Date    Anxiety     Asthma     Headache     Hyperlipidemia     Hypertension       Past Surgical History:   Procedure Laterality Date    BLADDER SURGERY  2000    BREAST SURGERY      cyst removed left breast    PARTIAL HYSTERECTOMY      SALPINGO-OOPHORECTOMY Left     ectopic pregnancy treated by Dr. Yosi Sharif History   Problem Relation Age of Onset    Arthritis Mother      rheumatoid    Cancer Father      ling cancer    Other Father      circulatory issues     Current Outpatient Prescriptions   Medication Sig Dispense Refill    oxyCODONE-acetaminophen (PERCOCET) 5-325 MG per tablet Take 1 tablet by mouth 2 times daily for 7 days. 14 tablet 0    diazepam (VALIUM) 5 MG tablet Take 1 tablet by mouth every 12 hours as needed for Anxiety (pain, spasm) for up to 5 days. 10 tablet 0    valsartan-hydrochlorothiazide (DIOVAN-HCT) 80-12.5 MG per tablet take 1 tablet by mouth once daily 30 tablet 3    cyclobenzaprine (FLEXERIL) 5 MG tablet Take 1 tablet by mouth 3 times daily as needed for Muscle spasms 30 tablet 3    atorvastatin (LIPITOR) 10 MG tablet TAKE 1 TABLET BY MOUTH ONE TIME A DAY  90 tablet 2    montelukast (SINGULAIR) 10 MG tablet take 1 tablet by mouth every evening 30 tablet 3    albuterol sulfate  (90 Base) MCG/ACT inhaler Inhale 2 puffs into the lungs 4 times daily as needed for Wheezing 1 Inhaler 3    cetirizine HCl (ZYRTEC) 5 MG/5ML SYRP Take 5 mg by mouth daily      Misc.  Devices MISC 1 each Musculoskeletal: Negative for back pain and gait problem. acute right low back pain. Skin: Negative for color change and rash. Objective:   Physical Exam  Constitutional: VS (see above). General appearance: normal development, habitus and attention, no deformities. Patient seems very uncomfortable. Not able to sit. Eyes: normal conjunctiva and lids. Skin: no rashes, lesions or ulcers. Musculoskeletal: normal gait. Nails: no clubbing or cyanosis. Tenderness to palpitation at her right spinal only area. With radiation to her right muscle area. Physical Exam   Musculoskeletal:        Back:      Psychiatric: alert and oriented to place, time and person. Normal mood and affect. Assessment:      1. Acute right-sided low back pain without sciatica         Plan:   X-ray ordered. 4.  Meet sounds like possible compression fracture. I discussed with patient that she should have the x-rays done now. We'll call with results. If needed, we'll order an MRI. Told the patient to even by a lumbar support. I reviewed with her recent CT scan of abdomen and also ultrasound kidneys which did not show any indication of any abnormalities on her kidneys. She has some gallstones, but nothing else. 14 tabs of Percocet called in for acute pain. Patient denies any involuntary loss of urine or bowels. Again, I discussed with her if she feels any changes, any progressing of the pain, any inability to hold urine or bowels. She needs to go to the ER. Call or return to clinic prn if these symptoms worsen or fail to improve as anticipated. I have reviewed the instructions with the patient, answering all questions to her and her daughter's satisfaction. No Follow-up on file. No orders of the defined types were placed in this encounter. No orders of the defined types were placed in this encounter.       Electronically signed by Rommel Townsend MD on 2/23/2018 at 9:55 AM       (Please note that portions of this note were completed with a voice recognition program. Efforts were made to edit the dictations but occasionally words are mis-transcribed.)

## 2018-02-26 NOTE — PROGRESS NOTES
General FM note    Jhonny Keane is a 46 y.o. female who presents today for follow up on her  medical conditions as noted below. Jhonny Keane is c/o of No chief complaint on file. Patient Active Problem List:     H/O severe sun exposure     Chronic tension-type headache, intractable     Essential hypertension     Microscopic hematuria     Lower abdominal pain     Past Medical History:   Diagnosis Date    Anxiety     Asthma     Headache     Hyperlipidemia     Hypertension       Past Surgical History:   Procedure Laterality Date    BLADDER SURGERY  2000    BREAST SURGERY      cyst removed left breast    PARTIAL HYSTERECTOMY      SALPINGO-OOPHORECTOMY Left     ectopic pregnancy treated by Dr. Kim Cain History   Problem Relation Age of Onset    Arthritis Mother      rheumatoid    Cancer Father      ling cancer    Other Father      circulatory issues     Current Outpatient Prescriptions   Medication Sig Dispense Refill    oxyCODONE-acetaminophen (PERCOCET) 5-325 MG per tablet Take 1 tablet by mouth 2 times daily for 7 days. 14 tablet 0    diazepam (VALIUM) 5 MG tablet Take 1 tablet by mouth every 12 hours as needed for Anxiety (pain, spasm) for up to 5 days. 10 tablet 0    valsartan-hydrochlorothiazide (DIOVAN-HCT) 80-12.5 MG per tablet take 1 tablet by mouth once daily 30 tablet 3    cyclobenzaprine (FLEXERIL) 5 MG tablet Take 1 tablet by mouth 3 times daily as needed for Muscle spasms 30 tablet 3    atorvastatin (LIPITOR) 10 MG tablet TAKE 1 TABLET BY MOUTH ONE TIME A DAY  90 tablet 2    montelukast (SINGULAIR) 10 MG tablet take 1 tablet by mouth every evening 30 tablet 3    albuterol sulfate  (90 Base) MCG/ACT inhaler Inhale 2 puffs into the lungs 4 times daily as needed for Wheezing 1 Inhaler 3    Misc.  Devices MISC 1 each by Does not apply route once for 1 dose 1 Device 0    cetirizine HCl (ZYRTEC) 5 MG/5ML SYRP Take 5 mg by mouth daily       No current facility-administered medications for this visit. ALLERGIES:    Allergies   Allergen Reactions    Eggs Or Egg-Derived Products Anaphylaxis    Shellfish-Derived Products Anaphylaxis, Shortness Of Breath and Swelling       Social History   Substance Use Topics    Smoking status: Current Every Day Smoker     Packs/day: 1.00     Types: Cigarettes    Smokeless tobacco: Never Used    Alcohol use 0.0 oz/week      Comment: social      There is no height or weight on file to calculate BMI. There were no vitals taken for this visit. Subjective:      HPI  48-year-old female coming today with acute onset of right low back pain. The patient was seen in the ER on 2/22/2018 due to this acute pain. The patient states that the pain is acute, very severe at times in her right lower back area. She tells me that this pain is excruciating. She has tried Tylenol, ibuprofen, ice, heat, which did not help with the pain so she went to the ER. At the ER. Blood work was done which was with normal limits. Tells me that she didn't get the medication at the ER, but these medication wore off quite fast.  Told she cannot get comfortable at all. She denies any involuntary loss of bowels or urine. She denies any acute sensation loss. The patient tells me that she never had similar pain before. She had kidney stones was thinking that this is a kidney stone but previous imaging did not show any kidney stones at her right kidney. Patient tells that she has grandkids and she picks them up, sits down again. This pain started slightly, but then progressed to high severity. Review of Systems   Constitutional: Negative for fever and unexpected weight change. Musculoskeletal: Negative for back pain and gait problem. acute onset off right low back pain. Skin: Negative for color change and rash. Objective:   Physical Exam  Constitutional: VS (see above).    General appearance: normal development, habitus and

## 2018-03-07 DIAGNOSIS — R51.9 INTRACTABLE HEADACHE, UNSPECIFIED CHRONICITY PATTERN, UNSPECIFIED HEADACHE TYPE: ICD-10-CM

## 2018-03-07 RX ORDER — ACETAMINOPHEN AND CODEINE PHOSPHATE 300; 30 MG/1; MG/1
TABLET ORAL
Qty: 30 TABLET | Refills: 0 | Status: SHIPPED | OUTPATIENT
Start: 2018-03-07 | End: 2018-05-30 | Stop reason: SDUPTHER

## 2018-05-04 ENCOUNTER — OFFICE VISIT (OUTPATIENT)
Dept: FAMILY MEDICINE CLINIC | Age: 53
End: 2018-05-04
Payer: COMMERCIAL

## 2018-05-04 ENCOUNTER — HOSPITAL ENCOUNTER (OUTPATIENT)
Age: 53
Discharge: HOME | End: 2018-05-04
Payer: COMMERCIAL

## 2018-05-04 VITALS
OXYGEN SATURATION: 95 % | BODY MASS INDEX: 29.34 KG/M2 | HEIGHT: 63 IN | SYSTOLIC BLOOD PRESSURE: 120 MMHG | DIASTOLIC BLOOD PRESSURE: 80 MMHG | RESPIRATION RATE: 16 BRPM | WEIGHT: 165.6 LBS | HEART RATE: 111 BPM

## 2018-05-04 DIAGNOSIS — K57.32 DIVERTICULITIS OF LARGE INTESTINE WITHOUT PERFORATION OR ABSCESS, UNSPECIFIED BLEEDING STATUS: Primary | ICD-10-CM

## 2018-05-04 DIAGNOSIS — K57.32 DIVERTICULITIS OF LARGE INTESTINE WITHOUT PERFORATION OR ABSCESS, UNSPECIFIED BLEEDING STATUS: ICD-10-CM

## 2018-05-04 LAB
ABSOLUTE EOS #: 0.4 K/UL (ref 0–0.4)
ABSOLUTE IMMATURE GRANULOCYTE: NORMAL K/UL (ref 0–0.3)
ABSOLUTE LYMPH #: 2.7 K/UL (ref 1–4.8)
ABSOLUTE MONO #: 0.7 K/UL (ref 0.1–1.2)
BASOPHILS # BLD: 0 % (ref 0–2)
BASOPHILS ABSOLUTE: 0 K/UL (ref 0–0.2)
DIFFERENTIAL TYPE: NORMAL
EOSINOPHILS RELATIVE PERCENT: 4 % (ref 1–4)
HCT VFR BLD CALC: 45.9 % (ref 36–46)
HEMOGLOBIN: 15.5 G/DL (ref 12–16)
IMMATURE GRANULOCYTES: NORMAL %
LYMPHOCYTES # BLD: 31 % (ref 24–44)
MCH RBC QN AUTO: 33 PG (ref 26–34)
MCHC RBC AUTO-ENTMCNC: 33.7 G/DL (ref 31–37)
MCV RBC AUTO: 98 FL (ref 80–100)
MONOCYTES # BLD: 8 % (ref 2–11)
NRBC AUTOMATED: NORMAL PER 100 WBC
PDW BLD-RTO: 13.9 % (ref 12.5–15.4)
PLATELET # BLD: 282 K/UL (ref 140–450)
PLATELET ESTIMATE: NORMAL
PMV BLD AUTO: 8.7 FL (ref 6–12)
RBC # BLD: 4.69 M/UL (ref 4–5.2)
RBC # BLD: NORMAL 10*6/UL
SEG NEUTROPHILS: 57 % (ref 36–66)
SEGMENTED NEUTROPHILS ABSOLUTE COUNT: 5 K/UL (ref 1.8–7.7)
WBC # BLD: 8.8 K/UL (ref 3.5–11)
WBC # BLD: NORMAL 10*3/UL

## 2018-05-04 PROCEDURE — 85025 COMPLETE CBC W/AUTO DIFF WBC: CPT

## 2018-05-04 PROCEDURE — 99213 OFFICE O/P EST LOW 20 MIN: CPT | Performed by: FAMILY MEDICINE

## 2018-05-04 PROCEDURE — 36415 COLL VENOUS BLD VENIPUNCTURE: CPT

## 2018-05-04 PROCEDURE — 80048 BASIC METABOLIC PNL TOTAL CA: CPT

## 2018-05-04 RX ORDER — CIPROFLOXACIN 250 MG/1
250 TABLET, FILM COATED ORAL 2 TIMES DAILY
Qty: 14 TABLET | Refills: 0 | Status: SHIPPED | OUTPATIENT
Start: 2018-05-04 | End: 2018-05-11

## 2018-05-04 RX ORDER — ONDANSETRON 4 MG/1
4 TABLET, ORALLY DISINTEGRATING ORAL EVERY 12 HOURS PRN
Qty: 20 TABLET | Refills: 1 | Status: SHIPPED | OUTPATIENT
Start: 2018-05-04 | End: 2018-08-07

## 2018-05-04 RX ORDER — METRONIDAZOLE 500 MG/1
500 TABLET ORAL 2 TIMES DAILY
Qty: 14 TABLET | Refills: 0 | Status: SHIPPED | OUTPATIENT
Start: 2018-05-04 | End: 2018-05-11

## 2018-05-05 DIAGNOSIS — R09.81 NASAL CONGESTION: ICD-10-CM

## 2018-05-06 LAB
ANION GAP SERPL CALCULATED.3IONS-SCNC: 18 MMOL/L (ref 9–17)
BUN BLDV-MCNC: 8 MG/DL (ref 6–20)
BUN/CREAT BLD: ABNORMAL (ref 9–20)
CALCIUM SERPL-MCNC: 9.2 MG/DL (ref 8.6–10.4)
CHLORIDE BLD-SCNC: 100 MMOL/L (ref 98–107)
CO2: 23 MMOL/L (ref 20–31)
CREAT SERPL-MCNC: 0.68 MG/DL (ref 0.5–0.9)
GFR AFRICAN AMERICAN: >60 ML/MIN
GFR NON-AFRICAN AMERICAN: >60 ML/MIN
GFR SERPL CREATININE-BSD FRML MDRD: ABNORMAL ML/MIN/{1.73_M2}
GFR SERPL CREATININE-BSD FRML MDRD: ABNORMAL ML/MIN/{1.73_M2}
GLUCOSE BLD-MCNC: 93 MG/DL (ref 70–99)
POTASSIUM SERPL-SCNC: 4.6 MMOL/L (ref 3.7–5.3)
SODIUM BLD-SCNC: 141 MMOL/L (ref 135–144)

## 2018-05-07 ENCOUNTER — TELEPHONE (OUTPATIENT)
Dept: FAMILY MEDICINE CLINIC | Age: 53
End: 2018-05-07

## 2018-05-07 RX ORDER — MONTELUKAST SODIUM 10 MG/1
TABLET ORAL
Qty: 30 TABLET | Refills: 3 | Status: SHIPPED | OUTPATIENT
Start: 2018-05-07 | End: 2018-09-03 | Stop reason: SDUPTHER

## 2018-05-30 DIAGNOSIS — R51.9 INTRACTABLE HEADACHE, UNSPECIFIED CHRONICITY PATTERN, UNSPECIFIED HEADACHE TYPE: ICD-10-CM

## 2018-05-31 RX ORDER — ACETAMINOPHEN AND CODEINE PHOSPHATE 300; 30 MG/1; MG/1
TABLET ORAL
Qty: 30 TABLET | Refills: 0 | Status: SHIPPED | OUTPATIENT
Start: 2018-05-31 | End: 2018-06-30

## 2018-06-06 DIAGNOSIS — I10 ESSENTIAL HYPERTENSION: ICD-10-CM

## 2018-06-06 RX ORDER — VALSARTAN AND HYDROCHLOROTHIAZIDE 80; 12.5 MG/1; MG/1
TABLET, FILM COATED ORAL
Qty: 30 TABLET | Refills: 3 | Status: SHIPPED | OUTPATIENT
Start: 2018-06-06 | End: 2018-08-09 | Stop reason: ALTCHOICE

## 2018-06-26 ENCOUNTER — OFFICE VISIT (OUTPATIENT)
Dept: FAMILY MEDICINE CLINIC | Age: 53
End: 2018-06-26
Payer: COMMERCIAL

## 2018-06-26 VITALS
SYSTOLIC BLOOD PRESSURE: 129 MMHG | DIASTOLIC BLOOD PRESSURE: 87 MMHG | OXYGEN SATURATION: 97 % | WEIGHT: 164.4 LBS | BODY MASS INDEX: 29.13 KG/M2 | HEART RATE: 92 BPM | RESPIRATION RATE: 16 BRPM

## 2018-06-26 DIAGNOSIS — Z23 NEED FOR SHINGLES VACCINE: ICD-10-CM

## 2018-06-26 DIAGNOSIS — Z12.31 ENCOUNTER FOR SCREENING MAMMOGRAM FOR BREAST CANCER: ICD-10-CM

## 2018-06-26 DIAGNOSIS — M10.041 ACUTE IDIOPATHIC GOUT OF RIGHT HAND: Primary | ICD-10-CM

## 2018-06-26 PROCEDURE — 99213 OFFICE O/P EST LOW 20 MIN: CPT | Performed by: FAMILY MEDICINE

## 2018-06-26 RX ORDER — INDOMETHACIN 50 MG/1
50 CAPSULE ORAL 2 TIMES DAILY WITH MEALS
Qty: 60 CAPSULE | Refills: 0 | Status: SHIPPED | OUTPATIENT
Start: 2018-06-26 | End: 2018-08-07 | Stop reason: ALTCHOICE

## 2018-06-26 RX ORDER — COLCHICINE 0.6 MG/1
0.6 CAPSULE ORAL DAILY
Qty: 30 CAPSULE | Refills: 0 | Status: SHIPPED | OUTPATIENT
Start: 2018-06-26 | End: 2018-08-07 | Stop reason: ALTCHOICE

## 2018-06-26 RX ORDER — PREDNISONE 20 MG/1
TABLET ORAL
Qty: 30 TABLET | Refills: 0 | Status: SHIPPED | OUTPATIENT
Start: 2018-06-26 | End: 2018-07-06

## 2018-07-13 ENCOUNTER — HOSPITAL ENCOUNTER (OUTPATIENT)
Dept: MAMMOGRAPHY | Age: 53
Discharge: HOME | End: 2018-07-15
Payer: COMMERCIAL

## 2018-07-13 ENCOUNTER — HOSPITAL ENCOUNTER (OUTPATIENT)
Dept: ULTRASOUND IMAGING | Age: 53
Discharge: HOME | End: 2018-07-15
Payer: COMMERCIAL

## 2018-07-13 ENCOUNTER — OFFICE VISIT (OUTPATIENT)
Dept: FAMILY MEDICINE CLINIC | Age: 53
End: 2018-07-13
Payer: COMMERCIAL

## 2018-07-13 ENCOUNTER — HOSPITAL ENCOUNTER (OUTPATIENT)
Dept: CT IMAGING | Age: 53
Discharge: HOME | End: 2018-07-15
Payer: COMMERCIAL

## 2018-07-13 VITALS
SYSTOLIC BLOOD PRESSURE: 131 MMHG | HEART RATE: 110 BPM | DIASTOLIC BLOOD PRESSURE: 69 MMHG | RESPIRATION RATE: 16 BRPM | HEIGHT: 62 IN | WEIGHT: 164 LBS | BODY MASS INDEX: 30.18 KG/M2

## 2018-07-13 DIAGNOSIS — R22.31 AXILLARY MASS, RIGHT: ICD-10-CM

## 2018-07-13 DIAGNOSIS — R93.89 ABNORMAL CT OF THE CHEST: ICD-10-CM

## 2018-07-13 DIAGNOSIS — R93.89 ABNORMAL CT OF THE CHEST: Primary | ICD-10-CM

## 2018-07-13 DIAGNOSIS — R22.31 AXILLARY MASS, RIGHT: Primary | ICD-10-CM

## 2018-07-13 LAB
CREAT SERPL-MCNC: 0.51 MG/DL (ref 0.5–0.9)
GFR AFRICAN AMERICAN: >60 ML/MIN
GFR NON-AFRICAN AMERICAN: >60 ML/MIN
GFR SERPL CREATININE-BSD FRML MDRD: NORMAL ML/MIN/{1.73_M2}
GFR SERPL CREATININE-BSD FRML MDRD: NORMAL ML/MIN/{1.73_M2}

## 2018-07-13 PROCEDURE — 6360000004 HC RX CONTRAST MEDICATION: Performed by: FAMILY MEDICINE

## 2018-07-13 PROCEDURE — 71260 CT THORAX DX C+: CPT

## 2018-07-13 PROCEDURE — G0279 TOMOSYNTHESIS, MAMMO: HCPCS

## 2018-07-13 PROCEDURE — 82565 ASSAY OF CREATININE: CPT

## 2018-07-13 PROCEDURE — 36415 COLL VENOUS BLD VENIPUNCTURE: CPT

## 2018-07-13 PROCEDURE — 76642 ULTRASOUND BREAST LIMITED: CPT

## 2018-07-13 PROCEDURE — 99213 OFFICE O/P EST LOW 20 MIN: CPT | Performed by: FAMILY MEDICINE

## 2018-07-13 PROCEDURE — 2580000003 HC RX 258: Performed by: FAMILY MEDICINE

## 2018-07-13 RX ORDER — SODIUM CHLORIDE 0.9 % (FLUSH) 0.9 %
10 SYRINGE (ML) INJECTION PRN
Status: DISCONTINUED | OUTPATIENT
Start: 2018-07-13 | End: 2018-07-16 | Stop reason: HOSPADM

## 2018-07-13 RX ORDER — 0.9 % SODIUM CHLORIDE 0.9 %
50 INTRAVENOUS SOLUTION INTRAVENOUS ONCE
Status: COMPLETED | OUTPATIENT
Start: 2018-07-13 | End: 2018-07-13

## 2018-07-13 RX ORDER — HYDROCODONE BITARTRATE AND ACETAMINOPHEN 5; 325 MG/1; MG/1
1 TABLET ORAL EVERY 6 HOURS PRN
Qty: 28 TABLET | Refills: 0 | Status: SHIPPED | OUTPATIENT
Start: 2018-07-13 | End: 2018-07-20

## 2018-07-13 RX ADMIN — SODIUM CHLORIDE 50 ML: 9 INJECTION, SOLUTION INTRAVENOUS at 12:24

## 2018-07-13 RX ADMIN — SODIUM CHLORIDE, PRESERVATIVE FREE 10 ML: 5 INJECTION INTRAVENOUS at 12:27

## 2018-07-13 RX ADMIN — IOPAMIDOL 80 ML: 755 INJECTION, SOLUTION INTRAVENOUS at 12:27

## 2018-07-13 ASSESSMENT — PATIENT HEALTH QUESTIONNAIRE - PHQ9
SUM OF ALL RESPONSES TO PHQ9 QUESTIONS 1 & 2: 0
SUM OF ALL RESPONSES TO PHQ QUESTIONS 1-9: 0
2. FEELING DOWN, DEPRESSED OR HOPELESS: 0

## 2018-07-13 NOTE — PROGRESS NOTES
for photophobia and visual disturbance. Respiratory: Negative. Negative for chest tightness and shortness of breath. Cardiovascular: Negative. Negative for chest pain and leg swelling. Gastrointestinal: Negative. Negative for abdominal pain and blood in stool. Endocrine: Negative for cold intolerance and polyuria. Genitourinary: Negative for dysuria and hematuria. Musculoskeletal: Negative. Skin: Negative for rash. Allergic/Immunologic: Negative. Neurological: Negative. Negative for dizziness, weakness and numbness. Hematological: Negative. Negative for adenopathy. Does not bruise/bleed easily. Psychiatric/Behavioral: Negative for sleep disturbance, dysphoric mood and  decreased concentration. The patient is not nervous/anxious. Objective:     Physical Exam:     Nursing note and vitals reviewed. /69   Pulse 110   Resp 16   Ht 5' 2\" (1.575 m)   Wt 164 lb (74.4 kg)   Breastfeeding? No   BMI 30.00 kg/m²   Constitutional: She is oriented to person, place, and time. She   appears well-developed and well-nourished. HENT:   Head: Normocephalic and atraumatic. Right Ear: External ear normal. Tympanic membrane is not erythematous. No middle ear effusion. Left Ear: External ear normal. Tympanic membrane is not erythematous. No middle ear effusion. Nose: No mucosal edema. Mouth/Throat: Oropharynx is clear and moist. No posterior oropharyngeal erythema. Eyes: Conjunctivae and EOM are normal. Pupils are equal, round, and reactive to light. Neck: Normal range of motion. Neck supple. No thyromegaly present. Cardiovascular: Normal rate, regular rhythm and normal heart sounds. No murmur heard. Pulmonary/Chest: Effort normal and breath sounds normal. She has no wheezes. Shehas no rales. she has approximate 10 cm firm mass of her right axilla extending into her right chest wall area  Abdominal: Soft.  Bowel sounds are normal. She exhibits no distension and no

## 2018-07-14 DIAGNOSIS — F41.9 ANXIETY: ICD-10-CM

## 2018-07-14 DIAGNOSIS — R93.89 ABNORMAL CT OF THE CHEST: Primary | ICD-10-CM

## 2018-07-14 DIAGNOSIS — R22.31 AXILLARY MASS, RIGHT: ICD-10-CM

## 2018-07-14 RX ORDER — OXYCODONE AND ACETAMINOPHEN 10; 325 MG/1; MG/1
1 TABLET ORAL EVERY 8 HOURS PRN
Qty: 90 TABLET | Refills: 0 | Status: SHIPPED | OUTPATIENT
Start: 2018-07-14 | End: 2018-08-13

## 2018-07-14 RX ORDER — ALPRAZOLAM 0.5 MG/1
0.5 TABLET ORAL 3 TIMES DAILY PRN
Qty: 90 TABLET | Refills: 0 | Status: SHIPPED | OUTPATIENT
Start: 2018-07-14 | End: 2018-08-02 | Stop reason: ALTCHOICE

## 2018-07-14 RX ORDER — PREDNISONE 20 MG/1
TABLET ORAL
Qty: 20 TABLET | Refills: 0 | Status: SHIPPED | OUTPATIENT
Start: 2018-07-14 | End: 2018-08-07 | Stop reason: ALTCHOICE

## 2018-07-14 NOTE — PROGRESS NOTES
Pt called having increasing pain NOrco not controlling   Swelling getting worse   Aware of dx of Breast cancer most likey   Scheduled for biopsy

## 2018-07-16 DIAGNOSIS — R92.8 ABNORMALITY OF RIGHT BREAST ON SCREENING MAMMOGRAM: Primary | ICD-10-CM

## 2018-07-16 DIAGNOSIS — R22.31 AXILLARY MASS, RIGHT: ICD-10-CM

## 2018-07-17 ENCOUNTER — HOSPITAL ENCOUNTER (OUTPATIENT)
Dept: ULTRASOUND IMAGING | Age: 53
Discharge: HOME | End: 2018-07-19
Payer: COMMERCIAL

## 2018-07-17 ENCOUNTER — HOSPITAL ENCOUNTER (OUTPATIENT)
Dept: MAMMOGRAPHY | Age: 53
Discharge: HOME | End: 2018-07-19
Payer: COMMERCIAL

## 2018-07-17 VITALS — DIASTOLIC BLOOD PRESSURE: 95 MMHG | HEART RATE: 97 BPM | SYSTOLIC BLOOD PRESSURE: 125 MMHG

## 2018-07-17 DIAGNOSIS — R22.31 AXILLARY MASS, RIGHT: ICD-10-CM

## 2018-07-17 DIAGNOSIS — R92.8 ABNORMALITY OF RIGHT BREAST ON SCREENING MAMMOGRAM: ICD-10-CM

## 2018-07-17 DIAGNOSIS — Z98.890 STATUS POST BREAST BIOPSY: ICD-10-CM

## 2018-07-17 PROCEDURE — 88305 TISSUE EXAM BY PATHOLOGIST: CPT

## 2018-07-17 PROCEDURE — 88377 M/PHMTRC ALYS ISHQUANT/SEMIQ: CPT

## 2018-07-17 PROCEDURE — 88360 TUMOR IMMUNOHISTOCHEM/MANUAL: CPT

## 2018-07-17 PROCEDURE — 77065 DX MAMMO INCL CAD UNI: CPT

## 2018-07-17 PROCEDURE — 19084 BX BREAST ADD LESION US IMAG: CPT

## 2018-07-17 PROCEDURE — 19083 BX BREAST 1ST LESION US IMAG: CPT

## 2018-07-17 PROCEDURE — 88342 IMHCHEM/IMCYTCHM 1ST ANTB: CPT

## 2018-07-17 PROCEDURE — 2500000003 HC RX 250 WO HCPCS

## 2018-07-17 NOTE — PROGRESS NOTES
Abnormal radiology result from patients   patient was notified about her abnormal test results and is scheduled for a biopsy

## 2018-07-20 ENCOUNTER — TELEPHONE (OUTPATIENT)
Dept: ONCOLOGY | Age: 53
End: 2018-07-20

## 2018-07-20 ENCOUNTER — TELEPHONE (OUTPATIENT)
Dept: FAMILY MEDICINE CLINIC | Age: 53
End: 2018-07-20

## 2018-07-20 NOTE — TELEPHONE ENCOUNTER
Notified of positive breast biopsy. Noted email to patient with results from physician. Called office to offer Mult[facility] to DrSimon For her patient. Left message with staff member who was sending message to Dr. Isreal Raygoza. With my return contact number. Wanted to let doctor know that we offer one day appointment with surgeon, medical oncologist and radiation oncologist, genetic counselor and nurse navigator. Consults are at [facility], if patient would require chemotherapy or radiation therapy this can be done at [facility] or at [facility]. With radiation available at [facility] or [facility]. Surgery can be done at several local hospitals. Navigation letter sent to Dr. Isreal Raygoza also. Will await contact from Dr. Isreal Raygoza.

## 2018-07-20 NOTE — LETTER
[facility]  [facility]  Nisha Cannon  [address]      [address] [facility] [id number]        Dear Norris Dupont MD:    As part of the care team at [facility], I was notified of your patient's recent cancer diagnosis. It is our practice to reach out to you and the patient to assist with any needs you and your patient may have. [facility]) Nurse Navigation Team can facilitate appointments with medical oncology, radiation oncology or any of our services. I would like to let you know about our [address] that meets every Friday and is available to most patients in [facility]. [facility] ([facility]) accepts most insurances and provides resources to those who are under or uninsured. During your patient's visit she will meet with our team of medical professionals: Surgeon, Medical Oncologist, Radiation Oncologist, Genetic Counselor and Nurse Navigator. She will also have the opportunity to meet with our Cancer Support Service team that includes: Registered Dietician, Lymphedema Specialist, Physical/Occupational Therapists, [facility], Patient Assistance Program Representative, Research Nurses and . This healthcare team will work together to provide your patient with the best options for her treatment, answer questions, set up additional testing as needed, follow up appointments, symptom management. We consider it a privilege to care for your patients. We look forward to treating your patients mind, body and spirit. We are there for them before, during and after their cancer treatments. I will follow this patient along with you. Please contact me at any time if you have questions about our Navigation Program or  [address]. Please let me know how we can support you and your patient. Your patient [place] will be discussed at an upcoming weekly multidisciplinary cancer conference. Kindest regards,  Asher Srinivasan, RN, BSN  [facility]) Oncology Nurse Navigator  P: [phone]  Fax: [phone]  Email: [email or web address]    CC: Sarahy Calvo MD

## 2018-07-24 ENCOUNTER — TELEPHONE (OUTPATIENT)
Dept: ONCOLOGY | Age: 53
End: 2018-07-24

## 2018-07-27 ENCOUNTER — TELEPHONE (OUTPATIENT)
Dept: FAMILY MEDICINE CLINIC | Age: 53
End: 2018-07-27

## 2018-08-01 ENCOUNTER — TELEPHONE (OUTPATIENT)
Dept: ONCOLOGY | Age: 53
End: 2018-08-01

## 2018-08-02 ENCOUNTER — OFFICE VISIT (OUTPATIENT)
Dept: FAMILY MEDICINE CLINIC | Age: 53
End: 2018-08-02
Payer: COMMERCIAL

## 2018-08-02 VITALS
OXYGEN SATURATION: 96 % | DIASTOLIC BLOOD PRESSURE: 83 MMHG | HEART RATE: 114 BPM | SYSTOLIC BLOOD PRESSURE: 126 MMHG | BODY MASS INDEX: 30.18 KG/M2 | RESPIRATION RATE: 16 BRPM | WEIGHT: 165 LBS

## 2018-08-02 DIAGNOSIS — Z17.0 MALIGNANT NEOPLASM OF LOWER-OUTER QUADRANT OF RIGHT BREAST OF FEMALE, ESTROGEN RECEPTOR POSITIVE (HCC): Primary | ICD-10-CM

## 2018-08-02 DIAGNOSIS — C50.511 MALIGNANT NEOPLASM OF LOWER-OUTER QUADRANT OF RIGHT BREAST OF FEMALE, ESTROGEN RECEPTOR POSITIVE (HCC): Primary | ICD-10-CM

## 2018-08-02 PROCEDURE — 99213 OFFICE O/P EST LOW 20 MIN: CPT | Performed by: FAMILY MEDICINE

## 2018-08-02 RX ORDER — CLONAZEPAM 0.5 MG/1
0.5 TABLET ORAL 2 TIMES DAILY PRN
Qty: 60 TABLET | Refills: 0 | Status: SHIPPED | OUTPATIENT
Start: 2018-08-02 | End: 2018-08-07 | Stop reason: SINTOL

## 2018-08-02 RX ORDER — OXYCODONE HYDROCHLORIDE AND ACETAMINOPHEN 5; 325 MG/1; MG/1
1 TABLET ORAL 2 TIMES DAILY
Qty: 60 TABLET | Refills: 0 | Status: SHIPPED | OUTPATIENT
Start: 2018-08-02 | End: 2018-09-04 | Stop reason: SDUPTHER

## 2018-08-02 NOTE — PATIENT INSTRUCTIONS
taking, check with your doctor, nurse or pharmacist.  Copyright 4683-4603 [address]. Version: 2.04. Revision date: 9/21/2016. Care instructions adapted under license by [facility] ([facility]). If you have questions about a medical condition or this instruction, always ask your healthcare professional. [place] [address] any warranty or liability for your use of this information. zolpidem  Pronunciation:  zandrae PI dem  Brand:  Ambien, Ambien CR, Edluar, Intermezzo, Zolpimist  What is the most important information I should know about zolpidem? Zolpidem may cause a severe allergic reaction. Stop taking zolpidem and get emergency medical help if you have any of these signs of an allergic reaction: hives; difficulty breathing; swelling of your face, lips, tongue, or throat. Never take zolpidem in larger amounts or for longer than prescribed. Do not take zolpidem if you have consumed alcohol during the day or just before bed. Some people using this medicine have engaged in activity such as driving, eating, walking, making phone calls, or having sex and later having no memory of the activity. What is zolpidem? Zolpidem is a sedative, also called a hypnotic. It affects chemicals in the brain that may be unbalanced in people with sleep problems (insomnia). Zolpidem is used to treat insomnia. The immediate-release forms of zolpidem are Ambien, Intermezzo,Edluar, and Zolpimist, which are used to help you fall asleep. The extended-release form of zolpidem is Ambien CR, which has a first layer that dissolves quickly to help you fall asleep, and a second layer that dissolves slowly to help you stay asleep. Ambien, Edluar, and Zolpimist are used to help you fall asleep when you first go to bed. Intermezzo, is used to help you fall back to sleep if you wake up in the middle of the night and then have trouble sleeping. Your doctor will determine which form of zolpidem is best for you.   Zolpidem may also be used for purposes not listed in this medication guide. What should I discuss with my healthcare provider before taking zolpidem? Some people using this medicine have engaged in activity such as driving, eating, walking, making phone calls, or having sex and later having no memory of the activity. If this happens to you, stop taking zolpidem and talk with your doctor about another treatment for your sleep disorder. You should not use this medicine if you are allergic to zolpidem. Zolpidem tablets may contain lactose. Use caution if you are sensitive to lactose. To make sure zolpidem is safe for you, tell your doctor if you have:  · kidney disease;  · liver disease;  · lung disease such as asthma, bronchitis, emphysema, or chronic obstructive pulmonary disease (COPD); · sleep apnea (breathing stops during sleep);  · myasthenia gravis;  · a history of depression, mental illness, or suicidal thoughts; or  · a history of drug or alcohol addiction. It is not known whether this medicine will harm an unborn baby. Tell your doctor if you are pregnant or plan to become pregnant. Zolpidem can pass into breast milk and may harm a nursing baby. Tell your doctor if you are breast-feeding a baby. The sedative effects of zolpidem may be stronger in older adults. Zolpidem is not approved for use by anyone younger than 25years old. It is dangerous to try and purchase zolpidem on the Internet or from vendors outside of the [place]. Medications distributed from [employer] may contain dangerous ingredients, or may not be distributed by a licensed pharmacy. Samples of zolpidem purchased on the Internet have been found to contain haloperidol (Haldol), a potent antipsychotic drug with dangerous side effects. For more information, contact the U.S. Food and Drug Administration (FDA) or visit www.fda.gov/buyonlineguide. How should I take zolpidem?   In January 2013, the Food and Drug Administration (FDA) lowered the recommended dose for Ambien, Edluar, and Zolpimist. If you have taken zolpidem in the past, your doctor may direct you to take a lower dose of this medicine than you did before. Follow all directions on your prescription label. Never take zolpidem in larger amounts, or for longer than prescribed. Read all patient information, medication guides, and instruction sheets provided to you. Ask your doctor or pharmacist if you have any questions. Zolpidem may be habit-forming. Never share zolpidem with another person, especially someone with a history of drug abuse or addiction. Keep the medication in a place where others cannot get to it. Selling or giving away this medicine is against the law. The recommended doses of zolpidem are not the same in men and women, and this drug is not approved for use in children. Misuse of this medication can result in dangerous side effects. Never take Ambien, Edluar, or Zolpimist if you do not have a full 7 to 8 hours to sleep before being active again. Do not take Intermezzo for middle-of-the-night insomnia unless you have 4 hours of sleep time left before being active. Zolpidem is for short-term use only. Tell your doctor if your insomnia symptoms do not improve, or if they get worse after using this medication for 7 to 10 nights in a row. Do not take zolpidem for longer than 4 or 5 weeks without your doctor's advice. Do not stop using zolpidem suddenly after long-term use, or you could have unpleasant withdrawal symptoms. Ask your doctor how to avoid withdrawal symptoms when you stop using zolpidem. Insomnia symptoms may also return after you stop taking zolpidem. These symptoms may seem to be even worse than before you started taking the medicine. Call your doctor if you still have worsened insomnia after the first few nights without taking zolpidem. Do not crush, chew, or break an Ambien CR tablet. Swallow the pill whole.   Do not swallow the Edluar or Intermezzo tablet whole. Place the tablet under your tongue and allow it to dissolve in your mouth without water. Spray Zolpimist directly into your mouth over your tongue. Prime the spray before the first use by pumping 5 test sprays into the air, away from your face. Prime the spray with 1 test spray if it has not been used for longer than 14 days. Store at room temperature away from moisture and heat. Do not freeze. Keep the Zolpimist  bottle upright when not in use. What happens if I miss a dose? Since zolpidem is taken only at bedtime if needed, you are not likely to miss a dose. What happens if I overdose? Seek emergency medical attention or call the Poison Help line at 1-873.229.3580. An overdose of zolpidem can be fatal, especially when it is taken together with other medications that can cause drowsiness. Overdose symptoms may include sleepiness, confusion, shallow breathing, feeling light-headed, fainting, or coma. What should I avoid while taking zolpidem? Zolpidem may impair your thinking or reactions. You may still feel sleepy the morning after taking zolpidem, especially if you take the extended-release tablet, or if you are a woman. Wait until you are fully awake before you drive, operate machinery,  an airplane, or do anything that requires you to be awake and alert. Dizziness or severe drowsiness can cause falls, accidents, or severe injuries. Avoid taking zolpidem during travel, such as to sleep on an airplane. You may be awakened before the effects of the medicine have worn off. Amnesia (forgetfulness) is more common if you do not get a full 7 to 8 hours of sleep after taking zolpidem. Do not take this medicine if you have consumed alcohol during the day or just before bed. What are the possible side effects of zolpidem? Zolpidem may cause a severe allergic reaction.  Stop taking zolpidem and get emergency medical help if you have signs of an allergic reaction: hives; difficulty medicines out of the reach of children, never share your medicines with others, and use this medication only for the indication prescribed. Every effort has been made to ensure that the information provided by [address] is accurate, up-to-date, and complete, but no guarantee is made to that effect. Drug information contained herein may be time sensitive. [facility] information has been compiled for use by healthcare practitioners and consumers in the [place] and therefore [facility] does not warrant that uses outside of the [place] are appropriate, unless specifically indicated otherwise. [facility]'s drug information does not endorse drugs, diagnose patients or recommend therapy. [facility]'s drug information is an informational resource designed to assist licensed healthcare practitioners in caring for their patients and/or to serve consumers viewing this service as a supplement to, and not a substitute for, the expertise, skill, knowledge and judgment of healthcare practitioners. The absence of a warning for a given drug or drug combination in no way should be construed to indicate that the drug or drug combination is safe, effective or appropriate for any given patient. [facility] does not assume any responsibility for any aspect of healthcare administered with the aid of information [facility] provides. The information contained herein is not intended to cover all possible uses, directions, precautions, warnings, drug interactions, allergic reactions, or adverse effects. If you have questions about the drugs you are taking, check with your doctor, nurse or pharmacist.  Copyright 6246-3402 [address]. Version: 11.01. Revision date: 5/1/2017. Care instructions adapted under license by [facility] ([facility]).  If you have questions about a medical condition or this instruction, always ask your healthcare professional. [place] [address] any warranty or liability for your use of this information. clonazepam  Pronunciation:  neil Ahn  Brand:  Xiomara Cruz  What is the most important information I should know about clonazepam?  You should not use this medicine if you have narrow-angle glaucoma or severe liver disease, or if you are allergic to Valium or a similar medicine. Call your doctor if you have any new or worsening symptoms of depression, unusual changes in behavior, or thoughts about suicide or hurting yourself. Clonazepam may be habit-forming. Never share clonazepam with another person. Keep the medication in a place where others cannot get to it. Selling or giving away this medicine is against the law. What is clonazepam?  Clonazepam is a benzodiazepine (nishi-ramandeep-dye-AZE-eh-peen) that affects chemicals in the brain that may be unbalanced. Clonazepam is also a seizure medicine, also called an anti-epileptic drug. Clonazepam is used to treat certain seizure disorders (including absence seizures or Lennox-Gastaut syndrome) in adults and children. Clonazepam is also used to treat panic disorder (including agoraphobia) in adults. Clonazepam may also be used for purposes not listed in this medication guide. What should I discuss with my healthcare provider before taking clonazepam?  You should not take clonazepam if you have:  · narrow-angle glaucoma;  · severe liver disease; or  · a history of allergic reaction to any benzodiazepine, such as diazepam (Valium), alprazolam (Xanax), lorazepam (Ativan), chlordiazepoxide, flurazepam, and others.   To make sure clonazepam is safe for you, tell your doctor if you have ever had:  · kidney or liver disease;  · glaucoma;  · asthma, emphysema, bronchitis, chronic obstructive pulmonary disorder (COPD), or other breathing problems;  · porphyria (a genetic enzyme disorder that causes symptoms affecting the skin or nervous system);  · depression or suicidal thoughts or behavior;  · mental illness, psychosis, or addiction to drugs or prescription and over-the-counter medicines, vitamins, and herbal products. Tell your doctor about all your current medicines and any medicine you start or stop using. Where can I get more information? Your pharmacist can provide more information about clonazepam.    Remember, keep this and all other medicines out of the reach of children, never share your medicines with others, and use this medication only for the indication prescribed. Every effort has been made to ensure that the information provided by [address] [place] [address]  is accurate, up-to-date, and complete, but no guarantee is made to that effect. Drug information contained herein may be time sensitive. [facility] information has been compiled for use by healthcare practitioners and consumers in the [place] and therefore [facility] does not warrant that uses outside of the [place] are appropriate, unless specifically indicated otherwise. [facility]'s drug information does not endorse drugs, diagnose patients or recommend therapy. [facility]'s drug information is an informational resource designed to assist licensed healthcare practitioners in caring for their patients and/or to serve consumers viewing this service as a supplement to, and not a substitute for, the expertise, skill, knowledge and judgment of healthcare practitioners. The absence of a warning for a given drug or drug combination in no way should be construed to indicate that the drug or drug combination is safe, effective or appropriate for any given patient. [facility] does not assume any responsibility for any aspect of healthcare administered with the aid of information [facility] provides. The information contained herein is not intended to cover all possible uses, directions, precautions, warnings, drug interactions, allergic reactions, or adverse effects.  If you have questions about the drugs you are taking, check with your doctor, nurse or pharmacist.  Copyright 8558-3368 [employer] Version: 8.01. Revision date: 11/6/2017. Care instructions adapted under license by [facility] ([facility]). If you have questions about a medical condition or this instruction, always ask your healthcare professional. [address] any warranty or liability for your use of this information. fluoxetine  Pronunciation:  eladio OX e teen  Brand:  PROzac, PROzac Weekly, Sarafem  What is the most important information I should know about fluoxetine? You should not use fluoxetine if you also take pimozide or thioridazine, or if you are being treated with methylene blue injection. Do not use this medicine if you have used an MAO inhibitor in the past 14 days, such as isocarboxazid, linezolid, methylene blue injection, phenelzine, rasagiline, selegiline, or tranylcypromine. You must wait at least 14 days after stopping an MAO inhibitor before you can take fluoxetine. You must wait 5 weeks after stopping fluoxetine before you can take thioridazine or an MAOI. Some young people have thoughts about suicide when first taking an antidepressant. Stay alert to changes in your mood or symptoms. Report any new or worsening symptoms to your doctor. What is fluoxetine? Fluoxetine is a selective serotonin reuptake inhibitors (SSRI) antidepressant. Fluoxetine affects chemicals in the brain that may be unbalanced in people with depression, panic, anxiety, or obsessive-compulsive symptoms. Fluoxetine is used to treat major depressive disorder, bulimia nervosa (an eating disorder) obsessive-compulsive disorder, panic disorder, and premenstrual dysphoric disorder (PMDD). Fluoxetine is sometimes used together with another medication called olanzapine (Zyprexa) to treat manic depression caused by bipolar disorder. This combination is also used to treat depression after at least 2 other medications have been tried without successful treatment of symptoms.   If you also take olanzapine (Zyprexa), read the you may have a relapse of depression if you stop taking your antidepressant. Tell your doctor right away if you become pregnant. Do not start or stop taking this medicine during pregnancy without your doctor's advice. Fluoxetine can pass into breast milk and may harm a nursing baby. Tell your doctor if you are breast-feeding a baby. Fluoxetine is not approved for use by anyone younger than 25years old. How should I take fluoxetine? Follow all directions on your prescription label. Your doctor may occasionally change your dose. Do not take this medicine in larger or smaller amounts or for longer than recommended. Do not crush, chew, break, or open a delayed-release capsule. Swallow it whole. Measure liquid medicine with the dosing syringe provided, or with a special dose-measuring spoon or medicine cup. If you do not have a dose-measuring device, ask your pharmacist for one. To treat premenstrual dysphoric disorder, the usual dose of fluoxetine is once daily while you are having your period, or 14 days before you expect your period to start. Follow your doctor's instructions. It may take up to 4 weeks before your symptoms improve. Keep using the medication as directed and tell your doctor if your symptoms do not improve. Do not stop using fluoxetine suddenly, or you could have unpleasant withdrawal symptoms. Ask your doctor how to safely stop using fluoxetine. Store at room temperature away from moisture and heat. What happens if I miss a dose? Take the missed dose as soon as you remember. Skip the missed dose if it is almost time for your next scheduled dose. Do not take extra medicine to make up the missed dose. If you miss a dose of Prozac Weekly, take the missed dose as soon as you remember and take the next dose 7 days later. However, if it is almost time for the next regularly scheduled weekly dose, skip the missed dose and take the next one as directed.  Do not take extra medicine to make up the followed by a red or purple skin rash that spreads (especially in the face or upper body) and causes blistering and peeling. Common side effects may include:  · sleep problems (insomnia), strange dreams;  · headache, dizziness, vision changes;  · tremors or shaking, feeling anxious or nervous;  · pain, weakness, yawning, tired feeling;  · upset stomach, loss of appetite, nausea, vomiting, diarrhea;  · dry mouth, sweating, hot flashes;  · changes in weight or appetite;  · stuffy nose, sinus pain, sore throat, flu symptoms; or  · decreased sex drive, impotence, or difficulty having an orgasm. This is not a complete list of side effects and others may occur. Call your doctor for medical advice about side effects. You may report side effects to FDA at 0-679-FDA-3891. What other drugs will affect fluoxetine? Taking fluoxetine with other drugs that make you sleepy or slow your breathing can cause dangerous side effects or death. Ask your doctor before taking a sleeping pill, narcotic pain medicine, prescription cough medicine, a muscle relaxer, or medicine for anxiety, depression, or seizures. Many drugs can interact with fluoxetine. Not all possible interactions are listed here. Tell your doctor about all your current medicines and any you start or stop using, especially:  · any other antidepressant;  · Walden's Wort;  · tryptophan (sometimes called L-tryptophan);  · a blood thinner --warfarin, Coumadin, Kaleb Maiers;  · medicine to treat anxiety, mood disorders, thought disorders, or mental illness --amitriptyline, buspirone, desipramine, lithium, nortriptyline, and many others;  · medicine to treat ADHD or narcolepsy --Adderall, Concerta, Ritalin, Vyvanse, Zenzedi, and others;  · migraine headache medicine --rizatriptan, sumatriptan, zolmitriptan, and others; or  · narcotic pain medicine --fentanyl, tramadol. This list is not complete and many other drugs can interact with fluoxetine.  This includes prescription and over-the-counter medicines, vitamins, and herbal products. Give a list of all your medicines to any healthcare provider who treats you. Where can I get more information? Your pharmacist can provide more information about fluoxetine. Remember, keep this and all other medicines out of the reach of children, never share your medicines with others, and use this medication only for the indication prescribed. Every effort has been made to ensure that the information provided by [address] [place] [address] is accurate, up-to-date, and complete, but no guarantee is made to that effect. Drug information contained herein may be time sensitive. [facility] information has been compiled for use by healthcare practitioners and consumers in the [place] and therefore [facility] does not warrant that uses outside of the [place] are appropriate, unless specifically indicated otherwise. [facility]'s drug information does not endorse drugs, diagnose patients or recommend therapy. [facility] drug information is an informational resource designed to assist licensed healthcare practitioners in caring for their patients and/or to serve consumers viewing this service as a supplement to, and not a substitute for, the expertise, skill, knowledge and judgment of healthcare practitioners. The absence of a warning for a given drug or drug combination in no way should be construed to indicate that the drug or drug combination is safe, effective or appropriate for any given patient. [facility] does not assume any responsibility for any aspect of healthcare administered with the aid of information [facility] provides. The information contained herein is not intended to cover all possible uses, directions, precautions, warnings, drug interactions, allergic reactions, or adverse effects. If you have questions about the drugs you are taking, check with your doctor, nurse or pharmacist.  Copyright 7349-7856 [employer] [address] [place] [address]: 24.01.  Revision date: 3/7/2017. Care instructions adapted under license by [facility] ([facility]). If you have questions about a medical condition or this instruction, always ask your healthcare professional. [address] any warranty or liability for your use of this information. sertraline  Pronunciation:  SER tra hugh  Brand:  Zoloft  What is the most important information I should know about sertraline? You should not use sertraline if you also take pimozide, or if you are being treated with methylene blue injection. Do not use this medicine if you have used an MAO inhibitor in the past 14 days, such as isocarboxazid, linezolid, methylene blue injection, phenelzine, rasagiline, selegiline, or tranylcypromine. Some young people have thoughts about suicide when first taking an antidepressant. Stay alert to changes in your mood or symptoms. Report any new or worsening symptoms to your doctor. Seek medical attention right away if you have symptoms of serotonin syndrome, such as: agitation, hallucinations, fever, sweating, shivering, fast heart rate, muscle stiffness, twitching, loss of coordination, nausea, vomiting, or diarrhea. What is sertraline? Sertraline is an antidepressant in a group of drugs called selective serotonin reuptake inhibitors (SSRIs). Sertraline affects chemicals in the brain that may be unbalanced in people with depression, panic, anxiety, or obsessive-compulsive symptoms. Sertraline is used to treat depression, obsessive-compulsive disorder, panic disorder, anxiety disorders, post-traumatic stress disorder (PTSD), and premenstrual dysphoric disorder (PMDD). Sertraline may also be used for purposes not listed in this medication guide. What should I discuss with my healthcare provider before taking sertraline? You should not use sertraline if you are allergic to it, or if you also take pimozide.  Do not use the liquid form of sertraline  if you are taking disulfiram (Antabuse) or you could have a severe reaction to the disulfiram.  Do not take sertraline within 14 days before or 14 days after you take an MAO inhibitor. A dangerous drug interaction could occur. MAO inhibitors include isocarboxazid, linezolid, phenelzine, rasagiline, selegiline, and tranylcypromine. To make sure sertraline is safe for you, tell your doctor if you have ever had:  · heart disease, high blood pressure, or a stroke;  · liver or kidney disease;  · a seizure;  · bleeding problems, or if you take warfarin (Coumadin, Jantoven);  · bipolar disorder (manic depression); or  · low levels of sodium in your blood. Some medicines can interact with sertraline and cause a serious condition called serotonin syndrome. Be sure your doctor knows if you also take stimulant medicine, opioid medicine, herbal products, other antidepressants, or medicine for mental illness, Parkinson's disease, migraine headaches, serious infections, or prevention of nausea and vomiting. Ask your doctor before making any changes in how or when you take your medications. Some young people have thoughts about suicide when first taking an antidepressant. Your doctor should check your progress at regular visits. Your family or other caregivers should also be alert to changes in your mood or symptoms. Taking an SSRI antidepressant during pregnancy may cause serious lung problems or other complications in the baby. However, you may have a relapse of depression if you stop taking your antidepressant. Tell your doctor right away if you become pregnant. Do not start or stop taking this medicine during pregnancy without your doctor's advice. It is not known whether sertraline passes into breast milk or if it could harm a nursing baby. Tell your doctor if you are breast-feeding a baby. Do not give sertraline to anyone younger than 25years old without the advice of a doctor.  Sertraline is FDA-approved for children with obsessive-compulsive disorder (OCD). It is not approved for treating depression in children. How should I take sertraline? Follow all directions on your prescription label. Your doctor may occasionally change your dose. Do not take this medicine in larger or smaller amounts or for longer than recommended. Sertraline may be taken with or without food. Try to take the medicine at the same time each day. The liquid (oral concentrate) form of sertraline must be diluted before you take it. To be sure you get the correct dose, measure the liquid with the medicine dropper provided. Mix the dose with 4 ounces (one-half cup) of water, ginger ale, lemon/lime soda, lemonade, or orange juice. Do not use any other liquids to dilute the medicine. Stir this mixture and drink all of it right away. To make sure you get the entire dose, add a little more water to the same glass, swirl gently and drink right away. This medicine can cause you to have a false positive drug screening test. If you provide a urine sample for drug screening, tell the laboratory staff that you are taking sertraline. It may take up to 4 weeks before your symptoms improve. Keep using the medication as directed and tell your doctor if your symptoms do not improve. Do not stop using sertraline suddenly, or you could have unpleasant withdrawal symptoms. Ask your doctor how to safely stop using sertraline. Store at room temperature away from moisture and heat. What happens if I miss a dose? Take the missed dose as soon as you remember. Skip the missed dose if it is almost time for your next scheduled dose. Do not take extra medicine to make up the missed dose. What happens if I overdose? Seek emergency medical attention or call the Poison Help line at 1-781.750.5863. What should I avoid while taking sertraline? Do not drink alcohol. Ask your doctor before taking a nonsteroidal anti-inflammatory drug (NSAID) for pain, arthritis, fever, or swelling.  This includes aspirin, ibuprofen (Advil, Motrin), naproxen (Aleve), celecoxib (Celebrex), diclofenac, indomethacin, meloxicam, and others. Using an NSAID with sertraline may cause you to bruise or bleed easily. This medication may impair your thinking or reactions. Be careful if you drive or do anything that requires you to be alert. What are the possible side effects of sertraline? Get emergency medical help if you have signs of an allergic reaction: skin rash or hives (with or without fever or joint pain); difficulty breathing; swelling of your face, lips, tongue, or throat. Report any new or worsening symptoms to your doctor, such as: mood or behavior changes, anxiety, panic attacks, trouble sleeping, or if you feel impulsive, irritable, agitated, hostile, aggressive, restless, hyperactive (mentally or physically), more depressed, or have thoughts about suicide or hurting yourself. Call your doctor at once if you have:  · a seizure (convulsions);  · blurred vision, tunnel vision, eye pain or swelling;  · low levels of sodium in the body --headache, confusion, memory problems, severe weakness, feeling unsteady; or  · manic episodes --racing thoughts, increased energy, unusual risk-taking behavior, extreme happiness, being irritable or talkative. Seek medical attention right away if you have symptoms of serotonin syndrome, such as: agitation, hallucinations, fever, sweating, shivering, fast heart rate, muscle stiffness, twitching, loss of coordination, nausea, vomiting, or diarrhea. Common side effects may include:  · drowsiness, tiredness, feeling anxious or agitated;  · indigestion, nausea, diarrhea, loss of appetite;  · sweating;  · tremors or shaking;  · sleep problems (insomnia); or  · decreased sex drive, impotence, or difficulty having an orgasm. This is not a complete list of side effects and others may occur. Call your doctor for medical advice about side effects.  You may report side effects to FDA at 1-800-FDA-1088. What other drugs will affect sertraline? Taking sertraline with other drugs that make you sleepy can worsen this effect. Ask your doctor before taking a sleeping pill, narcotic medication, muscle relaxer, or medicine for anxiety, depression, or seizures. Other drugs may interact with sertraline, including prescription and over-the-counter medicines, vitamins, and herbal products. Tell your doctor about all your current medicines and any medicine you start or stop using. Where can I get more information? Your pharmacist can provide more information about sertraline. Remember, keep this and all other medicines out of the reach of children, never share your medicines with others, and use this medication only for the indication prescribed. Every effort has been made to ensure that the information provided by [address] is accurate, up-to-date, and complete, but no guarantee is made to that effect. Drug information contained herein may be time sensitive. [facility] information has been compiled for use by healthcare practitioners and consumers in the [place] and therefore [facility] does not warrant that uses outside of the [place] are appropriate, unless specifically indicated otherwise. [facility]'s drug information does not endorse drugs, diagnose patients or recommend therapy. [facility] drug information is an informational resource designed to assist licensed healthcare practitioners in caring for their patients and/or to serve consumers viewing this service as a supplement to, and not a substitute for, the expertise, skill, knowledge and judgment of healthcare practitioners. The absence of a warning for a given drug or drug combination in no way should be construed to indicate that the drug or drug combination is safe, effective or appropriate for any given patient.  [facility] does not assume any responsibility for any aspect of healthcare administered with the aid of

## 2018-08-02 NOTE — PROGRESS NOTES
wants a refill. Patient also feels that the Xanax 0.5 mg does not help her sleep. She also feels that she was thinking that she is thinking about the diagnosis thinking about her family. She is not able to sleep. She still works. She states that she can take her laptop home that she is able to do what she did before. Review of Systems   Constitutional: Negative for fever and unexpected weight change. Pertinent items are noted in HPI. Objective:   Physical Exam  Constitutional: VS (see above). General appearance: normal development, habitus and attention, no deformities. Eyes: normal conjunctiva and lids. Skin: no rashes, lesions or ulcers. Musculoskeletal: normal gait. Nails: no clubbing or cyanosis. Psychiatric: alert and oriented to place, time and person. Normal mood and affect. Assessment:       Diagnosis Orders   1. Malignant neoplasm of lower-outer quadrant of right breast of female, estrogen receptor positive (HCC)  clonazePAM (KLONOPIN) 0.5 MG tablet    oxyCODONE-acetaminophen (PERCOCET) 5-325 MG per tablet       Plan:   Because of her diagnosis of metastatic breast cancer, right. I will provide patient with Percocet and CLONAZEPAM.  She will let me know if this medication, especially clonazepam helps her with anxiety throughout the day. She needs a different medication. Also did give her printouts about SSRIs. Call or return to clinic prn if these symptoms worsen or fail to improve as anticipated. I have reviewed the instructions with the patient, answering all questions to her satisfaction. Return in about 6 months (around 2/2/2019), or if symptoms worsen or fail to improve. No orders of the defined types were placed in this encounter. Orders Placed This Encounter   Medications    clonazePAM (KLONOPIN) 0.5 MG tablet     Sig: Take 1 tablet by mouth 2 times daily as needed for Anxiety for up to 30 days. .     Dispense:  60 tablet     Refill:  0    oxyCODONE-acetaminophen (PERCOCET) 5-325 MG per tablet     Sig: Take 1 tablet by mouth 2 times daily for 30 days. Intended supply: 3 days. Take lowest dose possible to manage pain.      Dispense:  60 tablet     Refill:  0       Electronically signed by Christ Wall MD on 8/2/2018 at 8:57 AM       (Please note that portions of this note were completed with a voice recognition program. Efforts were made to edit the dictations but occasionally words are mis-transcribed.)

## 2018-08-06 ENCOUNTER — HOSPITAL ENCOUNTER (OUTPATIENT)
Dept: MRI IMAGING | Age: 53
Discharge: HOME | End: 2018-08-08
Payer: COMMERCIAL

## 2018-08-06 DIAGNOSIS — Z85.3 HISTORY OF RIGHT BREAST CANCER: ICD-10-CM

## 2018-08-06 LAB — SURGICAL PATHOLOGY REPORT: NORMAL

## 2018-08-06 PROCEDURE — C8908 MRI W/O FOL W/CONT, BREAST,: HCPCS

## 2018-08-06 PROCEDURE — 6360000004 HC RX CONTRAST MEDICATION: Performed by: SURGERY

## 2018-08-06 PROCEDURE — 2580000003 HC RX 258: Performed by: SURGERY

## 2018-08-06 PROCEDURE — A9579 GAD-BASE MR CONTRAST NOS,1ML: HCPCS | Performed by: SURGERY

## 2018-08-06 RX ORDER — 0.9 % SODIUM CHLORIDE 0.9 %
20 INTRAVENOUS SOLUTION INTRAVENOUS
Status: DISCONTINUED | OUTPATIENT
Start: 2018-08-06 | End: 2018-08-09 | Stop reason: HOSPADM

## 2018-08-06 RX ORDER — SODIUM CHLORIDE 0.9 % (FLUSH) 0.9 %
10 SYRINGE (ML) INJECTION
Status: COMPLETED | OUTPATIENT
Start: 2018-08-06 | End: 2018-08-06

## 2018-08-06 RX ADMIN — GADOTERIDOL 15 ML: 279.3 INJECTION, SOLUTION INTRAVENOUS at 11:21

## 2018-08-06 RX ADMIN — Medication 10 ML: at 11:22

## 2018-08-07 ENCOUNTER — INITIAL CONSULT (OUTPATIENT)
Dept: ONCOLOGY | Age: 53
End: 2018-08-07
Payer: COMMERCIAL

## 2018-08-07 ENCOUNTER — TELEPHONE (OUTPATIENT)
Dept: ONCOLOGY | Age: 53
End: 2018-08-07

## 2018-08-07 VITALS
SYSTOLIC BLOOD PRESSURE: 109 MMHG | BODY MASS INDEX: 30.36 KG/M2 | DIASTOLIC BLOOD PRESSURE: 63 MMHG | HEIGHT: 62 IN | WEIGHT: 165 LBS | TEMPERATURE: 98.1 F | HEART RATE: 98 BPM

## 2018-08-07 DIAGNOSIS — C50.411 MALIGNANT NEOPLASM OF UPPER-OUTER QUADRANT OF RIGHT BREAST IN FEMALE, ESTROGEN RECEPTOR POSITIVE (HCC): ICD-10-CM

## 2018-08-07 DIAGNOSIS — Z17.0 MALIGNANT NEOPLASM OF UPPER-OUTER QUADRANT OF RIGHT BREAST IN FEMALE, ESTROGEN RECEPTOR POSITIVE (HCC): ICD-10-CM

## 2018-08-07 PROBLEM — C50.419 MALIGNANT NEOPLASM OF UPPER-OUTER QUADRANT OF BREAST IN FEMALE, ESTROGEN RECEPTOR POSITIVE (HCC): Status: ACTIVE | Noted: 2018-08-07

## 2018-08-07 PROCEDURE — 99245 OFF/OP CONSLTJ NEW/EST HI 55: CPT | Performed by: INTERNAL MEDICINE

## 2018-08-07 PROCEDURE — 99201 HC NEW PT, E/M LEVEL 1: CPT | Performed by: INTERNAL MEDICINE

## 2018-08-07 RX ORDER — ONDANSETRON 8 MG/1
8 TABLET, ORALLY DISINTEGRATING ORAL EVERY 8 HOURS PRN
Qty: 30 TABLET | Refills: 3 | Status: SHIPPED | OUTPATIENT
Start: 2018-08-07 | End: 2020-12-10

## 2018-08-07 RX ORDER — SODIUM CHLORIDE 0.9 % (FLUSH) 0.9 %
5 SYRINGE (ML) INJECTION PRN
Status: CANCELLED | OUTPATIENT
Start: 2018-09-06

## 2018-08-07 RX ORDER — OMEPRAZOLE 20 MG/1
20 CAPSULE, DELAYED RELEASE ORAL DAILY
Qty: 30 CAPSULE | Refills: 3 | Status: SHIPPED | OUTPATIENT
Start: 2018-08-07 | End: 2018-11-28 | Stop reason: SDUPTHER

## 2018-08-07 RX ORDER — ALPRAZOLAM 0.25 MG/1
0.25 TABLET ORAL NIGHTLY PRN
COMMUNITY
End: 2019-02-06 | Stop reason: SDUPTHER

## 2018-08-07 RX ORDER — PALONOSETRON 0.05 MG/ML
0.25 INJECTION, SOLUTION INTRAVENOUS ONCE
Status: CANCELLED | OUTPATIENT
Start: 2018-09-06

## 2018-08-07 RX ORDER — SODIUM CHLORIDE 0.9 % (FLUSH) 0.9 %
10 SYRINGE (ML) INJECTION PRN
Status: CANCELLED | OUTPATIENT
Start: 2018-09-06

## 2018-08-07 RX ORDER — SODIUM CHLORIDE 9 MG/ML
INJECTION, SOLUTION INTRAVENOUS CONTINUOUS
Status: CANCELLED | OUTPATIENT
Start: 2018-09-06

## 2018-08-07 RX ORDER — DEXAMETHASONE 4 MG/1
TABLET ORAL
Qty: 20 TABLET | Refills: 1 | Status: SHIPPED | OUTPATIENT
Start: 2018-08-07 | End: 2019-06-10

## 2018-08-07 RX ORDER — MEPERIDINE HYDROCHLORIDE 50 MG/ML
12.5 INJECTION INTRAMUSCULAR; INTRAVENOUS; SUBCUTANEOUS ONCE
Status: CANCELLED | OUTPATIENT
Start: 2018-09-06 | End: 2018-08-21

## 2018-08-07 RX ORDER — 0.9 % SODIUM CHLORIDE 0.9 %
10 VIAL (ML) INJECTION ONCE
Status: CANCELLED | OUTPATIENT
Start: 2018-09-06 | End: 2018-08-21

## 2018-08-07 RX ORDER — METHYLPREDNISOLONE SODIUM SUCCINATE 125 MG/2ML
125 INJECTION, POWDER, LYOPHILIZED, FOR SOLUTION INTRAMUSCULAR; INTRAVENOUS ONCE
Status: CANCELLED | OUTPATIENT
Start: 2018-09-06 | End: 2018-08-21

## 2018-08-07 RX ORDER — DIPHENHYDRAMINE HYDROCHLORIDE 50 MG/ML
50 INJECTION INTRAMUSCULAR; INTRAVENOUS ONCE
Status: CANCELLED | OUTPATIENT
Start: 2018-09-06 | End: 2018-08-21

## 2018-08-07 RX ORDER — HEPARIN SODIUM (PORCINE) LOCK FLUSH IV SOLN 100 UNIT/ML 100 UNIT/ML
500 SOLUTION INTRAVENOUS PRN
Status: CANCELLED | OUTPATIENT
Start: 2018-09-06

## 2018-08-07 RX ORDER — LIDOCAINE AND PRILOCAINE 25; 25 MG/G; MG/G
CREAM TOPICAL
Qty: 1 TUBE | Refills: 1 | Status: SHIPPED | OUTPATIENT
Start: 2018-08-07

## 2018-08-07 RX ORDER — SODIUM CHLORIDE 9 MG/ML
INJECTION, SOLUTION INTRAVENOUS ONCE
Status: CANCELLED | OUTPATIENT
Start: 2018-09-06 | End: 2018-08-21

## 2018-08-07 NOTE — PROGRESS NOTES
constipation. Genitourinary: Denies dysuria, hematuria, frequency, urgency or incontinence. Neurological: Denies headaches, decreased LOC, no sensory or motor focal deficits. Musculoskeletal:  No arthralgia no back pain or joint swelling. Skin: There are no rashes or bleeding. Psychiatric:  No anxiety, no depression. Endocrine: no diabetes or thyroid disease. Hematologic: no bleeding , no adenopathy. PHYSICAL EXAM: Shows a well appearing 46y.o.-year-old female who is not in pain or distress. Vital Signs: Blood pressure 109/63, pulse 98, temperature 98.1 °F (36.7 °C), height 5' 2\" (1.575 m), weight 165 lb (74.8 kg), not currently breastfeeding. HEENT: Normocephalic and atraumatic. Pupils are equal, round, reactive to light and accommodation. Extraocular muscles are intact. Neck: Showed no JVD, no carotid bruit . Lungs: Clear to auscultation bilaterally. Heart: Regular without any murmur. Abdomen: Soft, nontender. No hepatosplenomegaly. Extremities: Lower extremities show no edema, clubbing, or cyanosis. Breasts: Examination  showed thickening and swelling of the right breast.  There is clear mass in the upper outer quadrant, there is multiple palpable axillary lymph nodes, none of them is attached to skin or underlying structure Neuro exam: intact cranial nerves bilaterally no motor or sensory deficit, gait is normal. Lymphatic: no adenopathy appreciated in the supraclavicular, axillary, cervical or inguinal area    REVIEW OF LABORATORY DATA:   Pathology 7/17/18  Final Diagnosis     1.  Right breast, 10:00 core needle biopsy:         Invasive ductal carcinoma, grade 2.              Estrogen receptor, positive (>95%).         Progesterone receptor, positive (>95%).         High-grade ductal carcinoma in situ.       2.  Right Axillary Lymph Node, Core Needle Biopsy:         Metastatic ductal carcinoma. BREAST CARCINOMA:           POSITIVE FOR HER2 (ERBB2) GENE AMPLIFICATION BY FISH.      above described concern for neoplastic   disease, attention to on follow-up is recommended.       Hepatic steatosis. IMPRESSION:   1. Clinical stage [id number] triple positive breast cancer  2. Status post biopsy and staging studies  PLAN:   I had a very long discussion with the patient, explaining Radiology and pathological findings. Clinically, the patient has locally advanced breast cancer, I'm concerned about the present metastatic disease so PET/CT scan will be very instrumental to exclude metastatic disease. The patient has HER-2 positive breast cancer and I think starting with chemotherapy would make a lot of sense to try to shrink down her disease before upcoming surgery. Assuming that she does not have any metastatic disease, I plan to treat her aggressively with TCHP induction chemotherapy trying to induce clinical and pathological response  Side effects of chemotherapy were explained to the patient including hair loss, hematologic side effects, GI side effects and neuropathic side effects.   Also the possibility of cardiac side effects from anti-HER-2 therapy were explained  Recommendation  1- PET CT scan and that seems to be underway, ordered by Dr. Channing Horton  2- need a MediPort for upcoming chemotherapy  3- need baseline echocardiogram  4- plan [facility] chemotherapy as an outpatient

## 2018-08-07 NOTE — LETTER
condition. She is very anxious but overall is healthy and has minimal morbidity. Performance status is ECOG 0, most of her pain is related to the mass, axillary nodes as well as the biopsy area. She does not have any other bone pain or chest pain. No symptoms suggestive of metastatic disease    PAST MEDICAL HISTORY: has a past medical history of Anxiety; Asthma; Headache; Hyperlipidemia; Hypertension; and Malignant neoplasm of upper-outer quadrant of breast in female, estrogen receptor positive ([facility] [address] PAST SURGICAL HISTORY: has a past surgical history that includes partial hysterectomy (cervix not removed); Bladder surgery (2000); Tonsillectomy; Breast surgery; and Salpingo-oophorectomy (Left). CURRENT MEDICATIONS:  has a current medication list which includes the following prescription(s): alprazolam, dexamethasone, lidocaine-prilocaine, omeprazole, ondansetron, oxycodone-acetaminophen, oxycodone-acetaminophen, valsartan-hydrochlorothiazide, montelukast, cyclobenzaprine, atorvastatin, albuterol sulfate hfa, and cetirizine hcl, and the following Facility-Administered Medications: sodium chloride. ALLERGIES:  is allergic to eggs or egg-derived products and shellfish-derived products. FAMILY HISTORY: family history includes Arthritis in her mother; Breast Cancer in her paternal grandmother; Cancer in her father and sister; Berdie Erik in an other family member; Other in her father. Specific oncological history in the family including risks cancer in her paternal grandmother who was in her 45s underwent mastectomy. Her father had lung cancer and her sister had melanoma. Colon cancer is in the distant family member  SOCIAL HISTORY:  reports that she has been smoking Cigarettes. She has been smoking about 1.00 pack per day. She has never used smokeless tobacco. She reports that she drinks alcohol. She reports that she does not use drugs.     REVIEW OF SYSTEMS:

## 2018-08-08 ENCOUNTER — TELEPHONE (OUTPATIENT)
Dept: ONCOLOGY | Age: 53
End: 2018-08-08

## 2018-08-09 ENCOUNTER — TELEPHONE (OUTPATIENT)
Dept: FAMILY MEDICINE CLINIC | Age: 53
End: 2018-08-09

## 2018-08-09 DIAGNOSIS — Z72.0 TOBACCO ABUSE: ICD-10-CM

## 2018-08-09 RX ORDER — OLMESARTAN MEDOXOMIL AND HYDROCHLOROTHIAZIDE 20/12.5 20; 12.5 MG/1; MG/1
1 TABLET ORAL DAILY
Qty: 30 TABLET | Refills: 3 | Status: SHIPPED | OUTPATIENT
Start: 2018-08-09 | End: 2018-11-06

## 2018-08-09 NOTE — PROGRESS NOTES
Bonnie Lawson called from Dr. Aleena Aviles office asking is pt can have sips of clear liquids up to 4 hours prior to her surgery scheduled for 3:00pm on 8/13/2018. Dr. Juan Chen states pt can have sips of clear liquids up to 4 hours prior to her surgery time, but to not over due it and make sure pt knows what clear liquids are. Called Bonnie Lawson back and notified her of the above. States she will relay the message to the patient.

## 2018-08-10 ENCOUNTER — TELEPHONE (OUTPATIENT)
Dept: ONCOLOGY | Age: 53
End: 2018-08-10

## 2018-08-10 NOTE — TELEPHONE ENCOUNTER
Talked to the patient and she has picked up the blood pressure medication. Patient has decided not to take the Clonazepam due to the side effects and having stage 3 cancer.

## 2018-08-13 ENCOUNTER — PREP FOR PROCEDURE (OUTPATIENT)
Dept: SURGERY | Age: 53
End: 2018-08-13

## 2018-08-13 ENCOUNTER — HOSPITAL ENCOUNTER (OUTPATIENT)
Age: 53
Setting detail: OUTPATIENT SURGERY
Discharge: HOME | End: 2018-08-13
Attending: SURGERY | Admitting: SURGERY
Payer: COMMERCIAL

## 2018-08-13 ENCOUNTER — ANESTHESIA EVENT (OUTPATIENT)
Dept: OPERATING ROOM | Age: 53
End: 2018-08-13
Payer: COMMERCIAL

## 2018-08-13 ENCOUNTER — ANESTHESIA (OUTPATIENT)
Dept: OPERATING ROOM | Age: 53
End: 2018-08-13
Payer: COMMERCIAL

## 2018-08-13 ENCOUNTER — APPOINTMENT (OUTPATIENT)
Dept: GENERAL RADIOLOGY | Age: 53
End: 2018-08-13
Attending: SURGERY
Payer: COMMERCIAL

## 2018-08-13 VITALS — TEMPERATURE: 96.3 F | DIASTOLIC BLOOD PRESSURE: 54 MMHG | SYSTOLIC BLOOD PRESSURE: 102 MMHG | OXYGEN SATURATION: 97 %

## 2018-08-13 VITALS
OXYGEN SATURATION: 94 % | DIASTOLIC BLOOD PRESSURE: 68 MMHG | TEMPERATURE: 97.7 F | RESPIRATION RATE: 17 BRPM | WEIGHT: 164 LBS | HEART RATE: 89 BPM | BODY MASS INDEX: 29.06 KG/M2 | HEIGHT: 63 IN | SYSTOLIC BLOOD PRESSURE: 130 MMHG

## 2018-08-13 PROCEDURE — 7100000001 HC PACU RECOVERY - ADDTL 15 MIN: Performed by: SURGERY

## 2018-08-13 PROCEDURE — 6360000002 HC RX W HCPCS: Performed by: SURGERY

## 2018-08-13 PROCEDURE — 7100000000 HC PACU RECOVERY - FIRST 15 MIN: Performed by: SURGERY

## 2018-08-13 PROCEDURE — 7100000010 HC PHASE II RECOVERY - FIRST 15 MIN: Performed by: SURGERY

## 2018-08-13 PROCEDURE — 3700000000 HC ANESTHESIA ATTENDED CARE: Performed by: SURGERY

## 2018-08-13 PROCEDURE — 2709999900 HC NON-CHARGEABLE SUPPLY: Performed by: SURGERY

## 2018-08-13 PROCEDURE — 71045 X-RAY EXAM CHEST 1 VIEW: CPT

## 2018-08-13 PROCEDURE — 2580000003 HC RX 258: Performed by: NURSE ANESTHETIST, CERTIFIED REGISTERED

## 2018-08-13 PROCEDURE — 2500000003 HC RX 250 WO HCPCS: Performed by: NURSE ANESTHETIST, CERTIFIED REGISTERED

## 2018-08-13 PROCEDURE — 3600000002 HC SURGERY LEVEL 2 BASE: Performed by: SURGERY

## 2018-08-13 PROCEDURE — 3700000001 HC ADD 15 MINUTES (ANESTHESIA): Performed by: SURGERY

## 2018-08-13 PROCEDURE — 3600000012 HC SURGERY LEVEL 2 ADDTL 15MIN: Performed by: SURGERY

## 2018-08-13 PROCEDURE — 6360000002 HC RX W HCPCS: Performed by: NURSE ANESTHETIST, CERTIFIED REGISTERED

## 2018-08-13 PROCEDURE — 7100000011 HC PHASE II RECOVERY - ADDTL 15 MIN: Performed by: SURGERY

## 2018-08-13 PROCEDURE — 2580000003 HC RX 258: Performed by: SURGERY

## 2018-08-13 PROCEDURE — 2500000003 HC RX 250 WO HCPCS

## 2018-08-13 PROCEDURE — C1788 PORT, INDWELLING, IMP: HCPCS | Performed by: SURGERY

## 2018-08-13 DEVICE — PORT INFUS CATH DIA6.6FR TI ATTCH SIL PEEL APART INTRO SGL: Type: IMPLANTABLE DEVICE | Site: CHEST | Status: FUNCTIONAL

## 2018-08-13 RX ORDER — LIDOCAINE HYDROCHLORIDE 20 MG/ML
INJECTION, SOLUTION INFILTRATION; PERINEURAL PRN
Status: DISCONTINUED | OUTPATIENT
Start: 2018-08-13 | End: 2018-08-13 | Stop reason: SDUPTHER

## 2018-08-13 RX ORDER — SODIUM CHLORIDE, SODIUM LACTATE, POTASSIUM CHLORIDE, CALCIUM CHLORIDE 600; 310; 30; 20 MG/100ML; MG/100ML; MG/100ML; MG/100ML
INJECTION, SOLUTION INTRAVENOUS CONTINUOUS PRN
Status: DISCONTINUED | OUTPATIENT
Start: 2018-08-13 | End: 2018-08-13 | Stop reason: SDUPTHER

## 2018-08-13 RX ORDER — SODIUM CHLORIDE, SODIUM LACTATE, POTASSIUM CHLORIDE, CALCIUM CHLORIDE 600; 310; 30; 20 MG/100ML; MG/100ML; MG/100ML; MG/100ML
INJECTION, SOLUTION INTRAVENOUS CONTINUOUS
Status: DISCONTINUED | OUTPATIENT
Start: 2018-08-13 | End: 2018-08-13 | Stop reason: HOSPADM

## 2018-08-13 RX ORDER — FENTANYL CITRATE 50 UG/ML
INJECTION, SOLUTION INTRAMUSCULAR; INTRAVENOUS PRN
Status: DISCONTINUED | OUTPATIENT
Start: 2018-08-13 | End: 2018-08-13 | Stop reason: SDUPTHER

## 2018-08-13 RX ORDER — MIDAZOLAM HYDROCHLORIDE 1 MG/ML
INJECTION INTRAMUSCULAR; INTRAVENOUS PRN
Status: DISCONTINUED | OUTPATIENT
Start: 2018-08-13 | End: 2018-08-13 | Stop reason: SDUPTHER

## 2018-08-13 RX ORDER — PROPOFOL 10 MG/ML
INJECTION, EMULSION INTRAVENOUS PRN
Status: DISCONTINUED | OUTPATIENT
Start: 2018-08-13 | End: 2018-08-13 | Stop reason: SDUPTHER

## 2018-08-13 RX ORDER — SODIUM CHLORIDE, SODIUM LACTATE, POTASSIUM CHLORIDE, CALCIUM CHLORIDE 600; 310; 30; 20 MG/100ML; MG/100ML; MG/100ML; MG/100ML
INJECTION, SOLUTION INTRAVENOUS CONTINUOUS
Status: CANCELLED | OUTPATIENT
Start: 2018-08-13 | End: 2019-08-13

## 2018-08-13 RX ORDER — HEPARIN SODIUM 1000 [USP'U]/ML
INJECTION, SOLUTION INTRAVENOUS; SUBCUTANEOUS PRN
Status: DISCONTINUED | OUTPATIENT
Start: 2018-08-13 | End: 2018-08-13 | Stop reason: HOSPADM

## 2018-08-13 RX ORDER — BUPIVACAINE HYDROCHLORIDE 2.5 MG/ML
INJECTION, SOLUTION INFILTRATION; PERINEURAL PRN
Status: DISCONTINUED | OUTPATIENT
Start: 2018-08-13 | End: 2018-08-13 | Stop reason: HOSPADM

## 2018-08-13 RX ORDER — ONDANSETRON 2 MG/ML
INJECTION INTRAMUSCULAR; INTRAVENOUS PRN
Status: DISCONTINUED | OUTPATIENT
Start: 2018-08-13 | End: 2018-08-13 | Stop reason: SDUPTHER

## 2018-08-13 RX ORDER — GLYCOPYRROLATE 1 MG/5 ML
SYRINGE (ML) INTRAVENOUS PRN
Status: DISCONTINUED | OUTPATIENT
Start: 2018-08-13 | End: 2018-08-13 | Stop reason: SDUPTHER

## 2018-08-13 RX ADMIN — LIDOCAINE HYDROCHLORIDE 100 MG: 20 INJECTION, SOLUTION INFILTRATION; PERINEURAL at 15:46

## 2018-08-13 RX ADMIN — PHENYLEPHRINE HYDROCHLORIDE 100 MCG: 10 INJECTION INTRAVENOUS at 16:05

## 2018-08-13 RX ADMIN — CEFAZOLIN SODIUM 2 G: 2 SOLUTION INTRAVENOUS at 15:38

## 2018-08-13 RX ADMIN — PHENYLEPHRINE HYDROCHLORIDE 100 MCG: 10 INJECTION INTRAVENOUS at 16:29

## 2018-08-13 RX ADMIN — SODIUM CHLORIDE, POTASSIUM CHLORIDE, SODIUM LACTATE AND CALCIUM CHLORIDE: 600; 310; 30; 20 INJECTION, SOLUTION INTRAVENOUS at 15:38

## 2018-08-13 RX ADMIN — FENTANYL CITRATE 50 MCG: 50 INJECTION, SOLUTION INTRAMUSCULAR; INTRAVENOUS at 15:43

## 2018-08-13 RX ADMIN — PHENYLEPHRINE HYDROCHLORIDE 100 MCG: 10 INJECTION INTRAVENOUS at 14:19

## 2018-08-13 RX ADMIN — ONDANSETRON 4 MG: 2 INJECTION, SOLUTION INTRAMUSCULAR; INTRAVENOUS at 16:57

## 2018-08-13 RX ADMIN — PHENYLEPHRINE HYDROCHLORIDE 100 MCG: 10 INJECTION INTRAVENOUS at 16:20

## 2018-08-13 RX ADMIN — MIDAZOLAM 2 MG: 1 INJECTION INTRAMUSCULAR; INTRAVENOUS at 15:38

## 2018-08-13 RX ADMIN — SODIUM CHLORIDE, POTASSIUM CHLORIDE, SODIUM LACTATE AND CALCIUM CHLORIDE: 600; 310; 30; 20 INJECTION, SOLUTION INTRAVENOUS at 14:34

## 2018-08-13 RX ADMIN — PHENYLEPHRINE HYDROCHLORIDE 100 MCG: 10 INJECTION INTRAVENOUS at 16:16

## 2018-08-13 RX ADMIN — PROPOFOL 180 MG: 10 INJECTION, EMULSION INTRAVENOUS at 15:46

## 2018-08-13 RX ADMIN — PHENYLEPHRINE HYDROCHLORIDE 100 MCG: 10 INJECTION INTRAVENOUS at 16:25

## 2018-08-13 RX ADMIN — Medication 0.2 MG: at 16:57

## 2018-08-13 RX ADMIN — PHENYLEPHRINE HYDROCHLORIDE 200 MCG: 10 INJECTION INTRAVENOUS at 16:57

## 2018-08-13 RX ADMIN — SODIUM CHLORIDE, POTASSIUM CHLORIDE, SODIUM LACTATE AND CALCIUM CHLORIDE: 600; 310; 30; 20 INJECTION, SOLUTION INTRAVENOUS at 16:32

## 2018-08-13 RX ADMIN — PHENYLEPHRINE HYDROCHLORIDE 200 MCG: 10 INJECTION INTRAVENOUS at 16:51

## 2018-08-13 RX ADMIN — FENTANYL CITRATE 50 MCG: 50 INJECTION, SOLUTION INTRAMUSCULAR; INTRAVENOUS at 16:01

## 2018-08-13 RX ADMIN — PHENYLEPHRINE HYDROCHLORIDE 100 MCG: 10 INJECTION INTRAVENOUS at 16:47

## 2018-08-13 ASSESSMENT — PAIN SCALES - GENERAL
PAINLEVEL_OUTOF10: 0

## 2018-08-13 ASSESSMENT — PULMONARY FUNCTION TESTS
PIF_VALUE: 11
PIF_VALUE: 9
PIF_VALUE: 2
PIF_VALUE: 11
PIF_VALUE: 32
PIF_VALUE: 0
PIF_VALUE: 8
PIF_VALUE: 28
PIF_VALUE: 0
PIF_VALUE: 10
PIF_VALUE: 12
PIF_VALUE: 8
PIF_VALUE: 10
PIF_VALUE: 8
PIF_VALUE: 11
PIF_VALUE: 9
PIF_VALUE: 10
PIF_VALUE: 11
PIF_VALUE: 9
PIF_VALUE: 8
PIF_VALUE: 10
PIF_VALUE: 11
PIF_VALUE: 10
PIF_VALUE: 12
PIF_VALUE: 8
PIF_VALUE: 11
PIF_VALUE: 10
PIF_VALUE: 11
PIF_VALUE: 11
PIF_VALUE: 32
PIF_VALUE: 0
PIF_VALUE: 11
PIF_VALUE: 12
PIF_VALUE: 11
PIF_VALUE: 11
PIF_VALUE: 4
PIF_VALUE: 9
PIF_VALUE: 16
PIF_VALUE: 11
PIF_VALUE: 9
PIF_VALUE: 10
PIF_VALUE: 11
PIF_VALUE: 1
PIF_VALUE: 12
PIF_VALUE: 11
PIF_VALUE: 10
PIF_VALUE: 9
PIF_VALUE: 11
PIF_VALUE: 9
PIF_VALUE: 8
PIF_VALUE: 10
PIF_VALUE: 11
PIF_VALUE: 10
PIF_VALUE: 33
PIF_VALUE: 10
PIF_VALUE: 8
PIF_VALUE: 32
PIF_VALUE: 10
PIF_VALUE: 11
PIF_VALUE: 11
PIF_VALUE: 7
PIF_VALUE: 8
PIF_VALUE: 11
PIF_VALUE: 8
PIF_VALUE: 6
PIF_VALUE: 10
PIF_VALUE: 10
PIF_VALUE: 2
PIF_VALUE: 11
PIF_VALUE: 1
PIF_VALUE: 9
PIF_VALUE: 25
PIF_VALUE: 1
PIF_VALUE: 9
PIF_VALUE: 11
PIF_VALUE: 30
PIF_VALUE: 1
PIF_VALUE: 10
PIF_VALUE: 11
PIF_VALUE: 11
PIF_VALUE: 1
PIF_VALUE: 11
PIF_VALUE: 9
PIF_VALUE: 11
PIF_VALUE: 9
PIF_VALUE: 11
PIF_VALUE: 1
PIF_VALUE: 10
PIF_VALUE: 12
PIF_VALUE: 11
PIF_VALUE: 1
PIF_VALUE: 6
PIF_VALUE: 1
PIF_VALUE: 11
PIF_VALUE: 12

## 2018-08-13 ASSESSMENT — PAIN DESCRIPTION - DESCRIPTORS: DESCRIPTORS: THROBBING;DULL

## 2018-08-13 ASSESSMENT — PAIN - FUNCTIONAL ASSESSMENT: PAIN_FUNCTIONAL_ASSESSMENT: 0-10

## 2018-08-13 NOTE — OP NOTE
carried through the skin into the subcutaneous tissues. The pocket was manually dissected with my finger. Hemostasis was obtained with the blended cautery. A small incision was made at the level of the neck. The tendon passer passed from the chest to the neck and pulled the catheter back through this tunnel. An introducer was then passed over the guidewire and shown to be in good position. The wire was removed. The catheter was flushed with saline and then passed through the peel-away sheath of the introducer and positioned at the junction of the right atrium and superior vena cava. Fluoroscopy showed this to be in good position. The distal part of the catheter was then cut and passed over the hub of the port followed by the locking device. Good blood return was obtained. The port was then flushed with 20 mL of saline followed by 20 mL of heparinized saline. The port was sutured to the pocket with 2 sutures of 4-0 Prolene. Each incision was closed by approximating the dermis with sutures of 4-0 Monocryl followed by the skin with a running subcuticular stitch of 4-0 Monocryl. Dermabond was applied followed by sterile dressing. The patient was returned to the recovery room in good condition. A portable upright chest x-ray showed good position of the catheter at the junction of the superior vena cava and right atrium without pneumothorax.

## 2018-08-13 NOTE — ANESTHESIA PRE PROCEDURE
mouth every 8 hours as needed for Pain for up to 30 days. Intended supply: 30 days. 7/14/18 8/13/18  Kamini Gallo DO   cyclobenzaprine (FLEXERIL) 5 MG tablet Take 1 tablet by mouth 3 times daily as needed for Muscle spasms 1/18/18   Nicolas Yepez MD       Current medications:    Current Facility-Administered Medications   Medication Dose Route Frequency Provider Last Rate Last Dose    ceFAZolin (ANCEF) 2 g in dextrose 3 % 50 mL IVPB (duplex)  2 g Intravenous Once Loco Zuñiga MD        lactated ringers infusion   Intravenous Continuous Loco Zuñiga  mL/hr at 08/13/18 1434       Facility-Administered Medications Ordered in Other Encounters   Medication Dose Route Frequency Provider Last Rate Last Dose    phenylephrine (NATHAN-SYNEPHRINE) injection    PRN Erman Cue, APRN - CRNA   100 mcg at 08/13/18 1419    lactated ringers infusion    Continuous PRN Erman Cue, APRN - CRNA        midazolam (VERSED) injection    PRN Erman Cue, APRN - CRNA   2 mg at 08/13/18 1538    fentaNYL (SUBLIMAZE) injection    PRN Erman Cue, APRN - CRNA   50 mcg at 08/13/18 1543    lidocaine 2 % injection    PRN Erman Cue, APRN - CRNA   100 mg at 08/13/18 1546    propofol injection    PRN Erman Cue, APRN - CRNA   180 mg at 08/13/18 1546       Allergies:     Allergies   Allergen Reactions    Dye [Iodides] Anaphylaxis    Eggs Or Egg-Derived Products Anaphylaxis    Shellfish-Derived Products Anaphylaxis, Shortness Of Breath and Swelling       Problem List:    Patient Active Problem List   Diagnosis Code    H/O severe sun exposure Z91.89    Chronic tension-type headache, intractable G44.221    Essential hypertension I10    Microscopic hematuria R31.29    Lower abdominal pain R10.30    Malignant neoplasm of upper-outer quadrant of breast in female, estrogen receptor positive ([facility] [address] C50.419, Z17.0       Past Medical History:        Diagnosis Date    Anxiety     Asthma     Depression  Headache     Hyperlipidemia     Hypertension     Malignant neoplasm of upper-outer quadrant of breast in female, estrogen receptor positive ([facility] [address] 8/7/2018       Past Surgical History:        Procedure Laterality Date    BLADDER SURGERY  2000    BREAST SURGERY      cyst removed left breast    PARTIAL HYSTERECTOMY      SALPINGO-OOPHORECTOMY Left     ectopic pregnancy treated by Dr. Major Durant History:    Social History   Substance Use Topics    Smoking status: Current Every Day Smoker     Packs/day: 1.00     Types: Cigarettes    Smokeless tobacco: Never Used    Alcohol use 0.0 oz/week      Comment: social                                Ready to quit: Not Answered  Counseling given: Not Answered      Vital Signs (Current):   Vitals:    08/13/18 1410   BP: 127/72   Pulse: 91   Resp: 18   Temp: 98.2 °F (36.8 °C)   TempSrc: Oral   SpO2: 96%   Weight: 164 lb (74.4 kg)   Height: 5' 3\" (1.6 m)                                              BP Readings from Last 3 Encounters:   08/13/18 127/72   08/13/18 113/62   08/07/18 109/63       NPO Status: Time of last liquid consumption: 2300                        Time of last solid consumption: 2330                        Date of last liquid consumption: 08/12/18                        Date of last solid food consumption: 08/12/18    BMI:   Wt Readings from Last 3 Encounters:   08/13/18 164 lb (74.4 kg)   08/07/18 165 lb (74.8 kg)   08/02/18 165 lb (74.8 kg)     Body mass index is 29.05 kg/m².     CBC:   Lab Results   Component Value Date    WBC 8.8 05/04/2018    RBC 4.69 05/04/2018    HGB 15.5 05/04/2018    HCT 45.9 05/04/2018    MCV 98.0 05/04/2018    RDW 13.9 05/04/2018     05/04/2018       CMP:   Lab Results   Component Value Date     05/04/2018    K 4.6 05/04/2018     05/04/2018    CO2 23 05/04/2018    BUN 8 05/04/2018    CREATININE 0.51 07/13/2018    GFRAA >60 07/13/2018    LABGLOM >60 07/13/2018    GLUCOSE 93 05/04/2018    PROT 7.6 02/22/2018    CALCIUM 9.2 05/04/2018    BILITOT 0.87 02/22/2018    ALKPHOS 74 02/22/2018    AST 24 02/22/2018    ALT 28 02/22/2018       POC Tests: No results for input(s): POCGLU, POCNA, POCK, POCCL, POCBUN, POCHEMO, POCHCT in the last 72 hours. Coags: No results found for: PROTIME, INR, APTT    HCG (If Applicable):   Lab Results   Component Value Date    PREGTESTUR NEGATIVE 02/22/2018        ABGs: No results found for: PHART, PO2ART, GLR6AVK, AAC7BSP, BEART, H4VWFYVM     Type & Screen (If Applicable):  No results found for: LABABO, LABRH    Anesthesia Evaluation   no history of anesthetic complications:   Airway: Mallampati: I  TM distance: >3 FB   Neck ROM: full  Mouth opening: > = 3 FB Dental: normal exam         Pulmonary: breath sounds clear to auscultation  (+) asthma:                            Cardiovascular:  Exercise tolerance: good (>4 METS),   (+) hypertension:, hyperlipidemia    (-) dysrhythmias,  angina and  HAYES      Rhythm: regular  Rate: normal           Beta Blocker:  Not on Beta Blocker         Neuro/Psych:   (+) headaches:, depression/anxiety             GI/Hepatic/Renal:        (-) GERD, liver disease and no renal disease       Endo/Other:    (+) malignancy/cancer. (-) diabetes mellitus, hypothyroidism, hyperthyroidism               Abdominal:         (-) obese     Vascular:                                        Anesthesia Plan      general     ASA 3       Induction: intravenous. MIPS: Postoperative opioids intended. Anesthetic plan and risks discussed with patient.                       Eufemia Han MD   8/13/2018

## 2018-08-13 NOTE — H&P
includes the following prescription(s): alprazolam, dexamethasone, lidocaine-prilocaine, omeprazole, ondansetron, oxycodone-acetaminophen, oxycodone-acetaminophen, valsartan-hydrochlorothiazide, montelukast, cyclobenzaprine, atorvastatin, albuterol sulfate hfa, and cetirizine hcl, and the following Facility-Administered Medications: sodium chloride.     ALLERGIES:  is allergic to eggs or egg-derived products and shellfish-derived products.     FAMILY HISTORY: family history includes Arthritis in her mother; Breast Cancer in her paternal grandmother; Cancer in her father and sister; Colon Cancer in an other family member; Other in her father. Specific oncological history in the family including risks cancer in her paternal grandmother who was in her 45s underwent mastectomy. Her father had lung cancer and her sister had melanoma. Colon cancer is in the distant family member  SOCIAL HISTORY:  reports that she has been smoking Cigarettes. She has been smoking about 1.00 pack per day. She has never used smokeless tobacco. She reports that she drinks alcohol. She reports that she does not use drugs.     REVIEW OF SYSTEMS:   General: no fever or night sweats, Weight is stable. ENT: No double or blurred vision, no tinnitus or hearing problem, no dysphagia or sore throat   Respiratory: No chest pain, no shortness of breath, no cough or hemoptysis. Cardiovascular: Denies chest pain, PND or orthopnea. No L E swelling or palpitations. Gastrointestinal:    No nausea or vomiting, abdominal pain, diarrhea or constipation. Genitourinary: Denies dysuria, hematuria, frequency, urgency or incontinence. Neurological: Denies headaches, decreased LOC, no sensory or motor focal deficits. Musculoskeletal:  No arthralgia no back pain or joint swelling. Skin: There are no rashes or bleeding. Psychiatric:  No anxiety, no depression. Endocrine: no diabetes or thyroid disease.    Hematologic: no bleeding , no adenopathy.     PHYSICAL EXAM: Shows a well appearing 46y.o.-year-old female who is not in pain or distress. Vital Signs: Blood pressure 109/63, pulse 98, temperature 98.1 °F (36.7 °C), height 5' 2\" (1.575 m), weight 165 lb (74.8 kg), not currently breastfeeding. HEENT: Normocephalic and atraumatic. Pupils are equal, round, reactive to light and accommodation. Extraocular muscles are intact. Neck: Showed no JVD, no carotid bruit . Lungs: Clear to auscultation bilaterally. Heart: Regular without any murmur. Abdomen: Soft, nontender. No hepatosplenomegaly. Extremities: Lower extremities show no edema, clubbing, or cyanosis.  Breasts: Examination  showed thickening and swelling of the right breast.  There is clear mass in the upper outer quadrant, there is multiple palpable axillary lymph nodes, none of them is attached to skin or underlying structure Neuro exam: intact cranial nerves bilaterally no motor or sensory deficit, gait is normal. Lymphatic: no adenopathy appreciated in the supraclavicular, axillary, cervical or inguinal area     REVIEW OF LABORATORY DATA:   Pathology 7/17/18  Final Diagnosis     1.  Right breast, 10:00 core needle biopsy:         Invasive ductal carcinoma, grade 2.              Estrogen receptor, positive (>95%).         Progesterone receptor, positive (>95%).         High-grade ductal carcinoma in situ.       2.  Right Axillary Lymph Node, Core Needle Biopsy:         Metastatic ductal carcinoma.        BREAST CARCINOMA:           POSITIVE FOR HER2 (ERBB2) GENE AMPLIFICATION BY FISH.         HER2: CEP (D17Z1) RATIO, 2.2   REVIEW OF RADIOLOGICAL RESULTS:   Breast MRI 8/6/18  Impression   Findings concerning for multicentric right breast neoplasm.  In addition to   the dominant mass in the 3-4 o'clock position of the right breast, there is   an additional suspicious area of non masslike enhancement in the 5-6 o'clock   middle depth.       The dominant mass in the right breast demonstrates abnormal enhancement   extending to the nipple.  While there is retraction of the underlying   pectoralis musculature and feeding vascularity, there is no direct invasion   into the musculature demonstrated.       Multiple enlarged abnormal appearing right axillary lymph nodes.       No evidence for contralateral disease involvement.       BIRADS:   BIRADS - CATEGORY 6   Mamm 7/13/18  Impression   1. Highly concerning right breast mass identified at the 10 o'clock position   approximately 3-4 cm from the nipple.  This lesion measures 2.9 x 2.2 x 1.3   cm, although the accuracy of these dimensions is questionable due to the   nature of the lesion.  Associated vascularity noted. 2. 4 cm right axillary mass could represent a necrotic lymph node. 3. An additional 14 mm round hypoechoic mass is seen in the right axilla,   also concerning for adenopathy. 4. Tissue sampling of the primary breast mass as well as both axillary   lesions warranted.       BIRADS:   BIRADS - CATEGORY 5      Ct chest 7/13/18  Impression   Large right axillary lymph nodes with surrounding edema.  There is also   asymmetric breast tissue in the lateral right breast and skin thickening. Primary breast cancer with axillary lymph node involvement is suspected, in   the absence of an acute inflammatory process.  Further evaluation with   diagnostic mammography and ultrasound workup of the right axillary lymph   nodes is recommended.  Findings were discussed with JANNIE BARAHONA at 12:46 pm   on 7/13/2018.       3 mm right lung nodules.  Given the above described concern for neoplastic   disease, attention to on follow-up is recommended.       Hepatic steatosis.          IMPRESSION:   1. Clinical stage [id number] triple positive breast cancer  2. Status post biopsy and staging studies  PLAN:   I had a very long discussion with the patient, explaining Radiology and pathological findings.   Clinically, the patient has locally advanced breast cancer,

## 2018-08-14 ENCOUNTER — TELEPHONE (OUTPATIENT)
Dept: ONCOLOGY | Age: 53
End: 2018-08-14

## 2018-08-15 ENCOUNTER — TELEPHONE (OUTPATIENT)
Dept: ONCOLOGY | Age: 53
End: 2018-08-15

## 2018-08-15 DIAGNOSIS — Z17.0 MALIGNANT NEOPLASM OF UPPER-OUTER QUADRANT OF RIGHT BREAST IN FEMALE, ESTROGEN RECEPTOR POSITIVE (HCC): ICD-10-CM

## 2018-08-15 DIAGNOSIS — C50.411 MALIGNANT NEOPLASM OF UPPER-OUTER QUADRANT OF RIGHT BREAST IN FEMALE, ESTROGEN RECEPTOR POSITIVE (HCC): ICD-10-CM

## 2018-08-15 NOTE — TELEPHONE ENCOUNTER
Dr. Newman called and states that he would like pt to be seen in a follow up on 8/23/2018 @ 12:00.      I called pt and left a message for her    When she calls back please schedule her for that above appt time

## 2018-08-15 NOTE — TELEPHONE ENCOUNTER
Name: Angélica Thompson  : 1965  MRN: [id number]    Oncology Navigation Follow-Up Note    Contact Type:  Telephone      Notes: Writer covering for Zac Valenzuela RN navigator. Pt calls in stating had her port was placed on 18 and questions if it needs accessed within first wk. Explained that typically port is accessed by MD after placement to assess patency. Explained that is gets flushed and heparized and will be ok to wait until chemo begins to re-access and flush. Pt also reports she completed her PET scan at [facility]. Results scanned in chart and copy in triage office for Dr. Joseph Duverney to review tomorrow.

## 2018-08-16 ENCOUNTER — TELEPHONE (OUTPATIENT)
Dept: ONCOLOGY | Age: 53
End: 2018-08-16

## 2018-08-19 ENCOUNTER — TELEPHONE (OUTPATIENT)
Dept: ONCOLOGY | Age: 53
End: 2018-08-19

## 2018-08-20 ENCOUNTER — HOSPITAL ENCOUNTER (OUTPATIENT)
Dept: NON INVASIVE DIAGNOSTICS | Age: 53
Discharge: HOME | End: 2018-08-20
Payer: COMMERCIAL

## 2018-08-20 ENCOUNTER — TELEPHONE (OUTPATIENT)
Dept: ONCOLOGY | Age: 53
End: 2018-08-20

## 2018-08-20 LAB
LV EF: 59 %
LVEF MODALITY: NORMAL

## 2018-08-20 PROCEDURE — 93306 TTE W/DOPPLER COMPLETE: CPT

## 2018-08-20 NOTE — TELEPHONE ENCOUNTER
Called and spoke to Bowen, explained that we would want to keep her appointment on Thursday with Dr. Velasco June. He would like to review pet/ct scan results. With her. We are still awaiting chemo pre cert. Pt is agreeable.

## 2018-08-22 NOTE — TELEPHONE ENCOUNTER
Called Tim Casillas and let her know I checked with my  and precert is not back. I will check again tomorrow.

## 2018-08-23 ENCOUNTER — OFFICE VISIT (OUTPATIENT)
Dept: ONCOLOGY | Age: 53
End: 2018-08-23
Payer: COMMERCIAL

## 2018-08-23 ENCOUNTER — TELEPHONE (OUTPATIENT)
Dept: ONCOLOGY | Age: 53
End: 2018-08-23

## 2018-08-23 VITALS
BODY MASS INDEX: 28.94 KG/M2 | HEART RATE: 100 BPM | WEIGHT: 163.38 LBS | SYSTOLIC BLOOD PRESSURE: 116 MMHG | TEMPERATURE: 98 F | DIASTOLIC BLOOD PRESSURE: 65 MMHG

## 2018-08-23 DIAGNOSIS — Z17.0 MALIGNANT NEOPLASM OF UPPER-OUTER QUADRANT OF RIGHT BREAST IN FEMALE, ESTROGEN RECEPTOR POSITIVE (HCC): Primary | ICD-10-CM

## 2018-08-23 DIAGNOSIS — C50.411 MALIGNANT NEOPLASM OF UPPER-OUTER QUADRANT OF RIGHT BREAST IN FEMALE, ESTROGEN RECEPTOR POSITIVE (HCC): Primary | ICD-10-CM

## 2018-08-23 DIAGNOSIS — R59.0 CERVICAL ADENOPATHY: ICD-10-CM

## 2018-08-23 PROCEDURE — 99214 OFFICE O/P EST MOD 30 MIN: CPT | Performed by: INTERNAL MEDICINE

## 2018-08-23 PROCEDURE — G8427 DOCREV CUR MEDS BY ELIG CLIN: HCPCS | Performed by: INTERNAL MEDICINE

## 2018-08-23 PROCEDURE — G8417 CALC BMI ABV UP PARAM F/U: HCPCS | Performed by: INTERNAL MEDICINE

## 2018-08-23 PROCEDURE — 4004F PT TOBACCO SCREEN RCVD TLK: CPT | Performed by: INTERNAL MEDICINE

## 2018-08-23 PROCEDURE — 99211 OFF/OP EST MAY X REQ PHY/QHP: CPT | Performed by: INTERNAL MEDICINE

## 2018-08-23 PROCEDURE — 3017F COLORECTAL CA SCREEN DOC REV: CPT | Performed by: INTERNAL MEDICINE

## 2018-08-23 NOTE — PROGRESS NOTES
axilla as well neck bilaterally. INTERIM HISTORY: The patient presents for follow up and to review recent PET results. She is nervous about the results   PAST MEDICAL HISTORY: has a past medical history of Anxiety; Asthma; Depression; Headache; Hyperlipidemia; Hypertension; and Malignant neoplasm of upper-outer quadrant of breast in female, estrogen receptor positive ([facility] [address] PAST SURGICAL HISTORY: has a past surgical history that includes partial hysterectomy (cervix not removed); Bladder surgery (2000); Tonsillectomy; Breast surgery; Salpingo-oophorectomy (Left); Tunneled venous port placement (Left, 08/13/2018); and pr [facility] vad w/subq port age 10 yr/> (Left, 8/13/2018). CURRENT MEDICATIONS:  has a current medication list which includes the following prescription(s): proair hfa, olmesartan-hydrochlorothiazide, alprazolam, lidocaine-prilocaine, oxycodone-acetaminophen, cyclobenzaprine, atorvastatin, cetirizine hcl, dexamethasone, omeprazole, ondansetron, and montelukast.    ALLERGIES:  is allergic to dye [iodides]; eggs or egg-derived products; and shellfish-derived products. FAMILY HISTORY: family history includes Arthritis in her mother; Breast Cancer in her paternal grandmother; Cancer in her father and sister; Patience Bucio in an other family member; Other in her father. Specific oncological history in the family including risks cancer in her paternal grandmother who was in her 45s underwent mastectomy. Her father had lung cancer and her sister had melanoma. Colon cancer is in the distant family member    SOCIAL HISTORY:  reports that she has been smoking Cigarettes. She has been smoking about 1.00 pack per day. She has never used smokeless tobacco. She reports that she drinks alcohol. She reports that she does not use drugs. REVIEW OF SYSTEMS:   General: No fever or night sweats. Weight is stable.   ENT: No double or blurred vision, no tinnitus or hearing problem, no dysphagia or

## 2018-08-23 NOTE — LETTER
Kellen Ye MD    8/26/2018     Silver Gill MD   [address] [place] [address] [address] [place] [id number]    Dear Desirae Horton : Thank you for referring Christian Pittman, 1965, to me for evaluation. Below are the relevant portions of my assessment and plan of care. DIAGNOSIS:   1. Right-sided breast cancer, multifocal  2. Tumor is HER-2 and ER/OR positive  3. Evidence of gustabo metastases in the axilla  CURRENT THERAPY:  1. Staging studies in progress  2. Plan induction chemotherapy  BRIEF CASE HISTORY:   Christian Pittman is a very pleasant 46 y.o. female who is referred to us for management recommendation about her breast cancer. She presented with mass and swelling in her right breast and axillary area. She was having significant pain so she underwent a CT scan initially than that showed significant mass in her right breast with extensive axillary adenopathy. He was clearly thickening of the skin of the breast suggestive of infiltration. The patient underwent mammogram that confirmed the presence of multifocal breast masses is highly suggestive of malignancy. She underwent image guided biopsy and that confirmed the presence of grade 2 invasive ductal carcinoma that was ER/OR and HER-2/keyona positive. The patient underwent breast MRI that showed multifocal disease with involvement of the nipple. There is no clear involvement of the muscle or the skin of the breast, there is multiple axillary nodes but there is no involvement of internal mammary lymph node or contralateral breast or lymph nodes. I reviewed the CT scan of the chest and she had a CT scan of the abdomen and pelvis a couple months ago because of abdominal pain and that showed no clear evidence of metastatic disease. She is sent to us for a consultation, she presents today accompanied by her family.   She is alert and oriented and well informed about her condition. She is very anxious but overall is healthy and has minimal morbidity. Performance status is ECOG 0, most of her pain is related to the mass, axillary nodes as well as the biopsy area. She does not have any other bone pain or chest pain. No symptoms suggestive of metastatic disease. PET done 08/10/2018 showed metastatic disease in the right axilla as well neck bilaterally. INTERIM HISTORY: The patient presents for follow up and to review recent PET results. She is nervous about the results   PAST MEDICAL HISTORY: has a past medical history of Anxiety; Asthma; Depression; Headache; Hyperlipidemia; Hypertension; and Malignant neoplasm of upper-outer quadrant of breast in female, estrogen receptor positive ([facility] [address] PAST SURGICAL HISTORY: has a past surgical history that includes partial hysterectomy (cervix not removed); Bladder surgery (2000); Tonsillectomy; Breast surgery; Salpingo-oophorectomy (Left); Tunneled venous port placement (Left, 08/13/2018); [facility] 10 [facility] (Left, 8/13/2018). CURRENT MEDICATIONS:  has a current medication list which includes the following prescription(s): proair hfa, olmesartan-hydrochlorothiazide, alprazolam, lidocaine-prilocaine, oxycodone-acetaminophen, cyclobenzaprine, atorvastatin, cetirizine hcl, dexamethasone, omeprazole, ondansetron, and montelukast.    ALLERGIES:  is allergic to dye [iodides]; eggs or egg-derived products; and shellfish-derived products. FAMILY HISTORY: family history includes Arthritis in her mother; Breast Cancer in her paternal grandmother; Cancer in her father and sister; Rosalynd Blight in an other family member; Other in her father. Specific oncological history in the family including risks cancer in her paternal grandmother who was in her 45s underwent mastectomy. Her father had lung cancer and her sister had melanoma.   Colon cancer is in the distant family member intact cranial nerves bilaterally no motor or sensory deficit, gait is normal. Lymphatic: no adenopathy appreciated in the supraclavicular, axillary, cervical or inguinal area - nodes seen in PET not palpable    REVIEW OF LABORATORY DATA:   Lab Results   Component Value Date    WBC 8.8 05/04/2018    HGB 15.5 05/04/2018    HCT 45.9 05/04/2018    MCV 98.0 05/04/2018     05/04/2018        Chemistry        Component Value Date/Time     05/04/2018 2030    K 4.6 05/04/2018 2030     05/04/2018 2030    CO2 23 05/04/2018 2030    BUN 8 05/04/2018 2030    CREATININE 0.51 07/13/2018 1145        Component Value Date/Time    CALCIUM 9.2 05/04/2018 2030    ALKPHOS 74 02/22/2018 1322    AST 24 02/22/2018 1322    ALT 28 02/22/2018 1322    BILITOT 0.87 02/22/2018 1322            REVIEW OF RADIOLOGICAL RESULTS:   08/10/2018 PET CT SK BS MT IMPRESSION:  1. Metastatic disease in the right axilla as well as in the neck bilaterally as described above. 2. Postop changed I the right breast laterally with mild abnormal tracer uptake in this region. IMPRESSION:   1. Clinical stage [id number] triple positive breast cancer  2. Status post biopsy and staging studies  3. Staging studies suggest cervical adenopathy concerning for mets     PLAN:   1. We reviewed her PET which shows some uptake in the neck bilaterally. 2. I explained plan to biopsy lymph node in the neck to confirm. 3. I further explained if positive it will change staging and prognosis. 4. I am ordering biopsy. 5. Return to commence with chemo. If you have questions, please do not hesitate to call me. I look forward to following Mainor [place] along with you.     Sincerely,    Mena Quach MD  Hematology/ Oncology  Cell [phone]

## 2018-08-24 ENCOUNTER — TELEPHONE (OUTPATIENT)
Dept: ONCOLOGY | Age: 53
End: 2018-08-24

## 2018-08-24 NOTE — TELEPHONE ENCOUNTER
Spoke with Patience this am.  She updated me that Dr. Marty Philip wants the nodes on neck biopsied at [facility]. Pt is scheduled for Monday 8/27/18. Dr. Marty Philip wants to see patient back after biopsy to discuss results and to plan treatment. Pt scheduled for 8/30/18 at 0900 and if results not back we will reschedule. Pt relates if negative we will plan chemo as previously ordered. If biopsy is positive pt relates it is stage IV and not curable. We discussed that treatment is still available if this is the case. We talked for a few minutes and she relates she is doing o.k. She relates that she has a  of 30+years who is a \"rock\" to her. She is also blessed with good family and friends who are a very good support to her. She also relates that she has me. Encouragement given, and encouraged her to call as needed.

## 2018-08-28 ENCOUNTER — TELEPHONE (OUTPATIENT)
Dept: INFUSION THERAPY | Age: 53
End: 2018-08-28

## 2018-08-28 ENCOUNTER — TELEPHONE (OUTPATIENT)
Dept: ONCOLOGY | Age: 53
End: 2018-08-28

## 2018-08-28 NOTE — TELEPHONE ENCOUNTER
Spoke with patient today. She is a little bit anxious with the waiting for pre cert and the thought of chemotherapy. I spooke with Matilde W and pre cert is back and teaching scheduled for tomorrow at 0800. We will have her 1st treatment scheduled for her and notify her at the time of her teaching. Pt is seeing Dr. Karma Hodges to go over pathology done by Dr. Sommer Dela Cruz. His office spoke with  And relates that pathology will be back for appointment on Thursday.

## 2018-08-29 ENCOUNTER — OFFICE VISIT (OUTPATIENT)
Dept: ONCOLOGY | Age: 53
End: 2018-08-29
Payer: COMMERCIAL

## 2018-08-29 ENCOUNTER — TELEPHONE (OUTPATIENT)
Dept: ONCOLOGY | Age: 53
End: 2018-08-29

## 2018-08-29 DIAGNOSIS — Z17.0 MALIGNANT NEOPLASM OF UPPER-OUTER QUADRANT OF RIGHT BREAST IN FEMALE, ESTROGEN RECEPTOR POSITIVE (HCC): ICD-10-CM

## 2018-08-29 DIAGNOSIS — C50.411 MALIGNANT NEOPLASM OF UPPER-OUTER QUADRANT OF RIGHT BREAST IN FEMALE, ESTROGEN RECEPTOR POSITIVE (HCC): ICD-10-CM

## 2018-08-29 PROCEDURE — 99999 PR OFFICE/OUTPT VISIT,PROCEDURE ONLY: CPT | Performed by: INTERNAL MEDICINE

## 2018-08-29 PROCEDURE — 99214 OFFICE O/P EST MOD 30 MIN: CPT

## 2018-08-30 ENCOUNTER — OFFICE VISIT (OUTPATIENT)
Dept: ONCOLOGY | Age: 53
End: 2018-08-30
Payer: COMMERCIAL

## 2018-08-30 VITALS
BODY MASS INDEX: 28.97 KG/M2 | TEMPERATURE: 99.5 F | SYSTOLIC BLOOD PRESSURE: 126 MMHG | DIASTOLIC BLOOD PRESSURE: 70 MMHG | WEIGHT: 163.56 LBS | HEART RATE: 93 BPM

## 2018-08-30 DIAGNOSIS — Z17.0 MALIGNANT NEOPLASM OF UPPER-OUTER QUADRANT OF RIGHT BREAST IN FEMALE, ESTROGEN RECEPTOR POSITIVE (HCC): ICD-10-CM

## 2018-08-30 DIAGNOSIS — C50.411 MALIGNANT NEOPLASM OF UPPER-OUTER QUADRANT OF RIGHT BREAST IN FEMALE, ESTROGEN RECEPTOR POSITIVE (HCC): ICD-10-CM

## 2018-08-30 PROCEDURE — G8417 CALC BMI ABV UP PARAM F/U: HCPCS | Performed by: INTERNAL MEDICINE

## 2018-08-30 PROCEDURE — 4004F PT TOBACCO SCREEN RCVD TLK: CPT | Performed by: INTERNAL MEDICINE

## 2018-08-30 PROCEDURE — G8427 DOCREV CUR MEDS BY ELIG CLIN: HCPCS | Performed by: INTERNAL MEDICINE

## 2018-08-30 PROCEDURE — 3017F COLORECTAL CA SCREEN DOC REV: CPT | Performed by: INTERNAL MEDICINE

## 2018-08-30 PROCEDURE — 99215 OFFICE O/P EST HI 40 MIN: CPT | Performed by: INTERNAL MEDICINE

## 2018-08-30 PROCEDURE — 99211 OFF/OP EST MAY X REQ PHY/QHP: CPT | Performed by: INTERNAL MEDICINE

## 2018-08-30 NOTE — PROGRESS NOTES
of them is attached to skin or underlying structure Neuro exam: intact cranial nerves bilaterally no motor or sensory deficit, gait is normal. Lymphatic: no adenopathy appreciated in the supraclavicular, axillary, cervical or inguinal area - nodes seen in PET not palpable    REVIEW OF LABORATORY DATA:   Lab Results   Component Value Date    WBC 8.8 05/04/2018    HGB 15.5 05/04/2018    HCT 45.9 05/04/2018    MCV 98.0 05/04/2018     05/04/2018        Chemistry        Component Value Date/Time     05/04/2018 2030    K 4.6 05/04/2018 2030     05/04/2018 2030    CO2 23 05/04/2018 2030    BUN 8 05/04/2018 2030    CREATININE 0.51 07/13/2018 1145        Component Value Date/Time    CALCIUM 9.2 05/04/2018 2030    ALKPHOS 74 02/22/2018 1322    AST 24 02/22/2018 1322    ALT 28 02/22/2018 1322    BILITOT 0.87 02/22/2018 1322            REVIEW OF RADIOLOGICAL RESULTS:       IMPRESSION:   1. Clinical stage [id number] triple positive breast cancer  2. Status post biopsy and staging studies  3. Staging studies suggest cervical adenopathy concerning for mets, very thorough evaluation and sampling was done no metastatic disease was found; flow cytometry was negative for any lymphomas. 4. Disease is localized, plan for induction chemo. PLAN:   1. We reviewed her pathology which was negative for metastatic disease as well as any lymphomas. 2. Cancer is localized, plan for induction chemo. 3. She has completed chemo class and has very good understanding of treatment plan and potential side effects. 4. We discussed plan to monitor blood counts and Neulasta will be administered as needed in addition to anti-biotics. 5. Return to commence with chemo in 1 week.

## 2018-08-30 NOTE — LETTER
Shane Fuentes MD    8/30/2018     Sarahy Giang MD   [address] [facility] [address] [facility] [id number]    Dear Concepcion Ackerman : Thank you for referring Delphine Mak, 1965, to me for evaluation. Below are the relevant portions of my assessment and plan of care. DIAGNOSIS:   1. Right-sided breast cancer, multifocal  2. Tumor is HER-2 and ER/AR positive  3. Evidence of gustabo metastases in the axilla  4. Staging studies show cervical adenopathy with PET activity in the neck, however, all biopsies came back negative for metastatic disease. CURRENT THERAPY:  1. Plan induction chemotherapy with TCHP followed by surgery     BRIEF CASE HISTORY:   Delphine Mak is a very pleasant 46 y.o. female who is referred to us for management recommendation about her breast cancer. She presented with mass and swelling in her right breast and axillary area. She was having significant pain so she underwent a CT scan initially than that showed significant mass in her right breast with extensive axillary adenopathy. He was clearly thickening of the skin of the breast suggestive of infiltration. The patient underwent mammogram that confirmed the presence of multifocal breast masses is highly suggestive of malignancy. She underwent image guided biopsy and that confirmed the presence of grade 2 invasive ductal carcinoma that was ER/AR and HER-2/keyona positive. The patient underwent breast MRI that showed multifocal disease with involvement of the nipple. There is no clear involvement of the muscle or the skin of the breast, there is multiple axillary nodes but there is no involvement of internal mammary lymph node or contralateral breast or lymph nodes. I reviewed the CT scan of the chest and she had a CT scan of the abdomen and pelvis a couple months ago because of abdominal pain and that showed no clear evidence of metastatic disease. treatment plan and potential side effects. 4. We discussed plan to monitor blood counts and Neulasta will be administered as needed in addition to anti-biotics. 5. Return to commence with chemo in 1 week. If you have questions, please do not hesitate to call me. I look forward to following Ambar Jordan along with you.     Sincerely,    Zack Torres MD  Hematology/ Oncology  Cell [phone]

## 2018-09-03 DIAGNOSIS — R09.81 NASAL CONGESTION: ICD-10-CM

## 2018-09-04 DIAGNOSIS — C50.511 MALIGNANT NEOPLASM OF LOWER-OUTER QUADRANT OF RIGHT BREAST OF FEMALE, ESTROGEN RECEPTOR POSITIVE (HCC): ICD-10-CM

## 2018-09-04 DIAGNOSIS — Z17.0 MALIGNANT NEOPLASM OF LOWER-OUTER QUADRANT OF RIGHT BREAST OF FEMALE, ESTROGEN RECEPTOR POSITIVE (HCC): ICD-10-CM

## 2018-09-04 RX ORDER — MONTELUKAST SODIUM 10 MG/1
TABLET ORAL
Qty: 30 TABLET | Refills: 3 | Status: SHIPPED | OUTPATIENT
Start: 2018-09-04 | End: 2019-01-28

## 2018-09-04 RX ORDER — OXYCODONE HYDROCHLORIDE AND ACETAMINOPHEN 5; 325 MG/1; MG/1
1 TABLET ORAL 2 TIMES DAILY
Qty: 60 TABLET | Refills: 0 | Status: SHIPPED | OUTPATIENT
Start: 2018-09-04 | End: 2018-09-24 | Stop reason: SDUPTHER

## 2018-09-04 NOTE — TELEPHONE ENCOUNTER
Last visit 08-  No future visit    Pt would like to come in and  the order this afternoon- sometime after 2 today.   Thank you

## 2018-09-05 ENCOUNTER — TELEPHONE (OUTPATIENT)
Dept: ONCOLOGY | Age: 53
End: 2018-09-05

## 2018-09-06 ENCOUNTER — TELEPHONE (OUTPATIENT)
Dept: ONCOLOGY | Age: 53
End: 2018-09-06

## 2018-09-06 ENCOUNTER — HOSPITAL ENCOUNTER (OUTPATIENT)
Dept: INFUSION THERAPY | Age: 53
Discharge: HOME | End: 2018-09-06
Payer: COMMERCIAL

## 2018-09-06 VITALS
SYSTOLIC BLOOD PRESSURE: 129 MMHG | HEART RATE: 106 BPM | TEMPERATURE: 97.7 F | WEIGHT: 161.4 LBS | RESPIRATION RATE: 18 BRPM | BODY MASS INDEX: 28.59 KG/M2 | DIASTOLIC BLOOD PRESSURE: 79 MMHG

## 2018-09-06 DIAGNOSIS — C50.411 MALIGNANT NEOPLASM OF UPPER-OUTER QUADRANT OF RIGHT BREAST IN FEMALE, ESTROGEN RECEPTOR POSITIVE (HCC): ICD-10-CM

## 2018-09-06 DIAGNOSIS — Z17.0 MALIGNANT NEOPLASM OF UPPER-OUTER QUADRANT OF RIGHT BREAST IN FEMALE, ESTROGEN RECEPTOR POSITIVE (HCC): ICD-10-CM

## 2018-09-06 LAB
ABSOLUTE EOS #: 0 K/UL (ref 0–0.4)
ABSOLUTE IMMATURE GRANULOCYTE: ABNORMAL K/UL (ref 0–0.3)
ABSOLUTE LYMPH #: 0.7 K/UL (ref 1–4.8)
ABSOLUTE MONO #: 0.4 K/UL (ref 0.1–1.2)
ALBUMIN SERPL-MCNC: 4.4 G/DL (ref 3.5–5.2)
ALBUMIN/GLOBULIN RATIO: 1.5 (ref 1–2.5)
ALP BLD-CCNC: 61 U/L (ref 35–104)
ALT SERPL-CCNC: 13 U/L (ref 5–33)
ANION GAP SERPL CALCULATED.3IONS-SCNC: 16 MMOL/L (ref 9–17)
AST SERPL-CCNC: 15 U/L
BASOPHILS # BLD: 0 % (ref 0–2)
BASOPHILS ABSOLUTE: 0 K/UL (ref 0–0.2)
BILIRUB SERPL-MCNC: 0.53 MG/DL (ref 0.3–1.2)
BUN BLDV-MCNC: 13 MG/DL (ref 6–20)
BUN/CREAT BLD: ABNORMAL (ref 9–20)
CALCIUM SERPL-MCNC: 9.9 MG/DL (ref 8.6–10.4)
CHLORIDE BLD-SCNC: 100 MMOL/L (ref 98–107)
CO2: 22 MMOL/L (ref 20–31)
CREAT SERPL-MCNC: 0.52 MG/DL (ref 0.5–0.9)
DIFFERENTIAL TYPE: ABNORMAL
EOSINOPHILS RELATIVE PERCENT: 0 % (ref 1–4)
GFR AFRICAN AMERICAN: >60 ML/MIN
GFR NON-AFRICAN AMERICAN: >60 ML/MIN
GFR SERPL CREATININE-BSD FRML MDRD: ABNORMAL ML/MIN/{1.73_M2}
GFR SERPL CREATININE-BSD FRML MDRD: ABNORMAL ML/MIN/{1.73_M2}
GLUCOSE BLD-MCNC: 183 MG/DL (ref 70–99)
HCT VFR BLD CALC: 42.5 % (ref 36–46)
HEMOGLOBIN: 14.5 G/DL (ref 12–16)
IMMATURE GRANULOCYTES: ABNORMAL %
LYMPHOCYTES # BLD: 5 % (ref 24–44)
MCH RBC QN AUTO: 33.4 PG (ref 26–34)
MCHC RBC AUTO-ENTMCNC: 34.2 G/DL (ref 31–37)
MCV RBC AUTO: 97.6 FL (ref 80–100)
MONOCYTES # BLD: 3 % (ref 2–11)
NRBC AUTOMATED: ABNORMAL PER 100 WBC
PDW BLD-RTO: 13.8 % (ref 12.5–15.4)
PLATELET # BLD: 276 K/UL (ref 140–450)
PLATELET ESTIMATE: ABNORMAL
PMV BLD AUTO: 8.7 FL (ref 6–12)
POTASSIUM SERPL-SCNC: 3.8 MMOL/L (ref 3.7–5.3)
RBC # BLD: 4.35 M/UL (ref 4–5.2)
RBC # BLD: ABNORMAL 10*6/UL
SEG NEUTROPHILS: 92 % (ref 36–66)
SEGMENTED NEUTROPHILS ABSOLUTE COUNT: 13.5 K/UL (ref 1.8–7.7)
SODIUM BLD-SCNC: 138 MMOL/L (ref 135–144)
TOTAL PROTEIN: 7.4 G/DL (ref 6.4–8.3)
WBC # BLD: 14.7 K/UL (ref 3.5–11)
WBC # BLD: ABNORMAL 10*3/UL

## 2018-09-06 PROCEDURE — 96375 TX/PRO/DX INJ NEW DRUG ADDON: CPT

## 2018-09-06 PROCEDURE — 96413 CHEMO IV INFUSION 1 HR: CPT

## 2018-09-06 PROCEDURE — 96377 APPLICATON ON-BODY INJECTOR: CPT

## 2018-09-06 PROCEDURE — 96367 TX/PROPH/DG ADDL SEQ IV INF: CPT

## 2018-09-06 PROCEDURE — 80053 COMPREHEN METABOLIC PANEL: CPT

## 2018-09-06 PROCEDURE — 85025 COMPLETE CBC W/AUTO DIFF WBC: CPT

## 2018-09-06 PROCEDURE — 36591 DRAW BLOOD OFF VENOUS DEVICE: CPT

## 2018-09-06 PROCEDURE — 96417 CHEMO IV INFUS EACH ADDL SEQ: CPT

## 2018-09-06 PROCEDURE — 2580000003 HC RX 258: Performed by: INTERNAL MEDICINE

## 2018-09-06 PROCEDURE — 96415 CHEMO IV INFUSION ADDL HR: CPT

## 2018-09-06 PROCEDURE — 6360000002 HC RX W HCPCS: Performed by: INTERNAL MEDICINE

## 2018-09-06 RX ORDER — SODIUM CHLORIDE 0.9 % (FLUSH) 0.9 %
10 SYRINGE (ML) INJECTION PRN
Status: DISCONTINUED | OUTPATIENT
Start: 2018-09-06 | End: 2018-09-07 | Stop reason: HOSPADM

## 2018-09-06 RX ORDER — PALONOSETRON 0.05 MG/ML
0.25 INJECTION, SOLUTION INTRAVENOUS ONCE
Status: COMPLETED | OUTPATIENT
Start: 2018-09-06 | End: 2018-09-06

## 2018-09-06 RX ORDER — DEXAMETHASONE SODIUM PHOSPHATE 10 MG/ML
10 INJECTION, SOLUTION INTRAMUSCULAR; INTRAVENOUS ONCE
Status: COMPLETED | OUTPATIENT
Start: 2018-09-06 | End: 2018-09-06

## 2018-09-06 RX ORDER — SODIUM CHLORIDE 9 MG/ML
INJECTION, SOLUTION INTRAVENOUS ONCE
Status: COMPLETED | OUTPATIENT
Start: 2018-09-06 | End: 2018-09-06

## 2018-09-06 RX ORDER — HEPARIN SODIUM (PORCINE) LOCK FLUSH IV SOLN 100 UNIT/ML 100 UNIT/ML
500 SOLUTION INTRAVENOUS PRN
Status: DISCONTINUED | OUTPATIENT
Start: 2018-09-06 | End: 2018-09-07 | Stop reason: HOSPADM

## 2018-09-06 RX ADMIN — Medication 10 ML: at 15:38

## 2018-09-06 RX ADMIN — PEGFILGRASTIM 6 MG: KIT SUBCUTANEOUS at 14:32

## 2018-09-06 RX ADMIN — PALONOSETRON 0.25 MG: 0.05 INJECTION, SOLUTION INTRAVENOUS at 12:24

## 2018-09-06 RX ADMIN — CARBOPLATIN 600 MG: 10 INJECTION, SOLUTION INTRAVENOUS at 14:19

## 2018-09-06 RX ADMIN — HEPARIN 500 UNITS: 100 SYRINGE at 15:38

## 2018-09-06 RX ADMIN — Medication 10 ML: at 08:12

## 2018-09-06 RX ADMIN — SODIUM CHLORIDE: 9 INJECTION, SOLUTION INTRAVENOUS at 08:50

## 2018-09-06 RX ADMIN — Medication 10 ML: at 08:10

## 2018-09-06 RX ADMIN — DOCETAXEL ANHYDROUS 136 MG: 10 INJECTION, SOLUTION INTRAVENOUS at 13:09

## 2018-09-06 RX ADMIN — SODIUM CHLORIDE 150 MG: 900 INJECTION, SOLUTION INTRAVENOUS at 12:26

## 2018-09-06 RX ADMIN — DEXAMETHASONE SODIUM PHOSPHATE 10 MG: 10 INJECTION, SOLUTION INTRAMUSCULAR; INTRAVENOUS at 12:13

## 2018-09-06 RX ADMIN — PERTUZUMAB 840 MG: 30 INJECTION, SOLUTION, CONCENTRATE INTRAVENOUS at 08:55

## 2018-09-06 RX ADMIN — TRASTUZUMAB 600 MG: 150 INJECTION, POWDER, LYOPHILIZED, FOR SOLUTION INTRAVENOUS at 10:33

## 2018-09-06 NOTE — PROGRESS NOTES
Pt here for C1D1. Denies any new complaints. Labs drawn from port and results reviewed. Confirmed with pt she started her decadron at home.

## 2018-09-12 ENCOUNTER — TELEPHONE (OUTPATIENT)
Dept: FAMILY MEDICINE CLINIC | Age: 53
End: 2018-09-12

## 2018-09-13 ENCOUNTER — TELEPHONE (OUTPATIENT)
Dept: ONCOLOGY | Age: 53
End: 2018-09-13

## 2018-09-24 DIAGNOSIS — C50.511 MALIGNANT NEOPLASM OF LOWER-OUTER QUADRANT OF RIGHT BREAST OF FEMALE, ESTROGEN RECEPTOR POSITIVE (HCC): ICD-10-CM

## 2018-09-24 DIAGNOSIS — Z17.0 MALIGNANT NEOPLASM OF LOWER-OUTER QUADRANT OF RIGHT BREAST OF FEMALE, ESTROGEN RECEPTOR POSITIVE (HCC): ICD-10-CM

## 2018-09-24 RX ORDER — OXYCODONE HYDROCHLORIDE AND ACETAMINOPHEN 5; 325 MG/1; MG/1
1 TABLET ORAL 2 TIMES DAILY
Qty: 60 TABLET | Refills: 0 | Status: SHIPPED | OUTPATIENT
Start: 2018-09-24 | End: 2018-10-24

## 2018-09-27 ENCOUNTER — TELEPHONE (OUTPATIENT)
Dept: ONCOLOGY | Age: 53
End: 2018-09-27

## 2018-09-27 ENCOUNTER — HOSPITAL ENCOUNTER (OUTPATIENT)
Dept: INFUSION THERAPY | Age: 53
Discharge: HOME | End: 2018-09-27
Payer: COMMERCIAL

## 2018-09-27 ENCOUNTER — OFFICE VISIT (OUTPATIENT)
Dept: ONCOLOGY | Age: 53
End: 2018-09-27
Payer: COMMERCIAL

## 2018-09-27 VITALS
TEMPERATURE: 97.6 F | BODY MASS INDEX: 28.79 KG/M2 | WEIGHT: 162.5 LBS | HEART RATE: 109 BPM | SYSTOLIC BLOOD PRESSURE: 123 MMHG | DIASTOLIC BLOOD PRESSURE: 82 MMHG

## 2018-09-27 DIAGNOSIS — C50.411 MALIGNANT NEOPLASM OF UPPER-OUTER QUADRANT OF RIGHT BREAST IN FEMALE, ESTROGEN RECEPTOR POSITIVE (HCC): ICD-10-CM

## 2018-09-27 DIAGNOSIS — Z17.0 MALIGNANT NEOPLASM OF UPPER-OUTER QUADRANT OF RIGHT BREAST IN FEMALE, ESTROGEN RECEPTOR POSITIVE (HCC): ICD-10-CM

## 2018-09-27 DIAGNOSIS — C50.411 MALIGNANT NEOPLASM OF UPPER-OUTER QUADRANT OF RIGHT BREAST IN FEMALE, ESTROGEN RECEPTOR POSITIVE (HCC): Primary | ICD-10-CM

## 2018-09-27 DIAGNOSIS — K52.1 CHEMOTHERAPY INDUCED DIARRHEA: ICD-10-CM

## 2018-09-27 DIAGNOSIS — T45.1X5A CHEMOTHERAPY INDUCED DIARRHEA: ICD-10-CM

## 2018-09-27 DIAGNOSIS — Z17.0 MALIGNANT NEOPLASM OF UPPER-OUTER QUADRANT OF RIGHT BREAST IN FEMALE, ESTROGEN RECEPTOR POSITIVE (HCC): Primary | ICD-10-CM

## 2018-09-27 LAB
ABSOLUTE EOS #: 0 K/UL (ref 0–0.4)
ABSOLUTE IMMATURE GRANULOCYTE: ABNORMAL K/UL (ref 0–0.3)
ABSOLUTE LYMPH #: 0.8 K/UL (ref 1–4.8)
ABSOLUTE MONO #: 0.7 K/UL (ref 0.1–1.2)
ALBUMIN SERPL-MCNC: 4.3 G/DL (ref 3.5–5.2)
ALBUMIN/GLOBULIN RATIO: 1.5 (ref 1–2.5)
ALP BLD-CCNC: 71 U/L (ref 35–104)
ALT SERPL-CCNC: 16 U/L (ref 5–33)
ANION GAP SERPL CALCULATED.3IONS-SCNC: 16 MMOL/L (ref 9–17)
AST SERPL-CCNC: 17 U/L
BASOPHILS # BLD: 0 % (ref 0–2)
BASOPHILS ABSOLUTE: 0 K/UL (ref 0–0.2)
BILIRUB SERPL-MCNC: 0.23 MG/DL (ref 0.3–1.2)
BUN BLDV-MCNC: 14 MG/DL (ref 6–20)
BUN/CREAT BLD: ABNORMAL (ref 9–20)
CALCIUM SERPL-MCNC: 9.9 MG/DL (ref 8.6–10.4)
CHLORIDE BLD-SCNC: 100 MMOL/L (ref 98–107)
CO2: 26 MMOL/L (ref 20–31)
CREAT SERPL-MCNC: 0.47 MG/DL (ref 0.5–0.9)
DIFFERENTIAL TYPE: ABNORMAL
EOSINOPHILS RELATIVE PERCENT: 0 % (ref 1–4)
GFR AFRICAN AMERICAN: >60 ML/MIN
GFR NON-AFRICAN AMERICAN: >60 ML/MIN
GFR SERPL CREATININE-BSD FRML MDRD: ABNORMAL ML/MIN/{1.73_M2}
GFR SERPL CREATININE-BSD FRML MDRD: ABNORMAL ML/MIN/{1.73_M2}
GLUCOSE BLD-MCNC: 144 MG/DL (ref 70–99)
HCT VFR BLD CALC: 38.4 % (ref 36–46)
HEMOGLOBIN: 13.1 G/DL (ref 12–16)
IMMATURE GRANULOCYTES: ABNORMAL %
LYMPHOCYTES # BLD: 6 % (ref 24–44)
MCH RBC QN AUTO: 33 PG (ref 26–34)
MCHC RBC AUTO-ENTMCNC: 34.2 G/DL (ref 31–37)
MCV RBC AUTO: 96.7 FL (ref 80–100)
MONOCYTES # BLD: 5 % (ref 2–11)
NRBC AUTOMATED: ABNORMAL PER 100 WBC
PDW BLD-RTO: 14 % (ref 12.5–15.4)
PLATELET # BLD: 425 K/UL (ref 140–450)
PLATELET ESTIMATE: ABNORMAL
PMV BLD AUTO: 8 FL (ref 6–12)
POTASSIUM SERPL-SCNC: 3.6 MMOL/L (ref 3.7–5.3)
RBC # BLD: 3.97 M/UL (ref 4–5.2)
RBC # BLD: ABNORMAL 10*6/UL
SEG NEUTROPHILS: 89 % (ref 36–66)
SEGMENTED NEUTROPHILS ABSOLUTE COUNT: 11.4 K/UL (ref 1.8–7.7)
SODIUM BLD-SCNC: 142 MMOL/L (ref 135–144)
TOTAL PROTEIN: 7.1 G/DL (ref 6.4–8.3)
WBC # BLD: 12.9 K/UL (ref 3.5–11)
WBC # BLD: ABNORMAL 10*3/UL

## 2018-09-27 PROCEDURE — 6360000002 HC RX W HCPCS: Performed by: INTERNAL MEDICINE

## 2018-09-27 PROCEDURE — G8417 CALC BMI ABV UP PARAM F/U: HCPCS | Performed by: INTERNAL MEDICINE

## 2018-09-27 PROCEDURE — 96377 APPLICATON ON-BODY INJECTOR: CPT

## 2018-09-27 PROCEDURE — 85025 COMPLETE CBC W/AUTO DIFF WBC: CPT

## 2018-09-27 PROCEDURE — 96417 CHEMO IV INFUS EACH ADDL SEQ: CPT

## 2018-09-27 PROCEDURE — 80053 COMPREHEN METABOLIC PANEL: CPT

## 2018-09-27 PROCEDURE — 4004F PT TOBACCO SCREEN RCVD TLK: CPT | Performed by: INTERNAL MEDICINE

## 2018-09-27 PROCEDURE — 96366 THER/PROPH/DIAG IV INF ADDON: CPT

## 2018-09-27 PROCEDURE — 99214 OFFICE O/P EST MOD 30 MIN: CPT | Performed by: INTERNAL MEDICINE

## 2018-09-27 PROCEDURE — G8427 DOCREV CUR MEDS BY ELIG CLIN: HCPCS | Performed by: INTERNAL MEDICINE

## 2018-09-27 PROCEDURE — 2580000003 HC RX 258: Performed by: INTERNAL MEDICINE

## 2018-09-27 PROCEDURE — 96375 TX/PRO/DX INJ NEW DRUG ADDON: CPT

## 2018-09-27 PROCEDURE — 96413 CHEMO IV INFUSION 1 HR: CPT

## 2018-09-27 PROCEDURE — 96367 TX/PROPH/DG ADDL SEQ IV INF: CPT

## 2018-09-27 PROCEDURE — 3017F COLORECTAL CA SCREEN DOC REV: CPT | Performed by: INTERNAL MEDICINE

## 2018-09-27 PROCEDURE — 36591 DRAW BLOOD OFF VENOUS DEVICE: CPT

## 2018-09-27 RX ORDER — DIPHENHYDRAMINE HYDROCHLORIDE 50 MG/ML
50 INJECTION INTRAMUSCULAR; INTRAVENOUS ONCE
Status: CANCELLED | OUTPATIENT
Start: 2018-09-27 | End: 2018-09-27

## 2018-09-27 RX ORDER — DIPHENOXYLATE HYDROCHLORIDE AND ATROPINE SULFATE 2.5; .025 MG/1; MG/1
1 TABLET ORAL 4 TIMES DAILY PRN
Qty: 120 TABLET | Refills: 0 | Status: SHIPPED | OUTPATIENT
Start: 2018-09-27 | End: 2018-10-27

## 2018-09-27 RX ORDER — SODIUM CHLORIDE 9 MG/ML
INJECTION, SOLUTION INTRAVENOUS CONTINUOUS
Status: CANCELLED | OUTPATIENT
Start: 2018-11-08

## 2018-09-27 RX ORDER — DIPHENHYDRAMINE HYDROCHLORIDE 50 MG/ML
50 INJECTION INTRAMUSCULAR; INTRAVENOUS ONCE
Status: CANCELLED | OUTPATIENT
Start: 2018-10-18 | End: 2018-10-18

## 2018-09-27 RX ORDER — EPINEPHRINE 1 MG/ML
0.3 INJECTION, SOLUTION, CONCENTRATE INTRAVENOUS PRN
Status: CANCELLED | OUTPATIENT
Start: 2018-09-27

## 2018-09-27 RX ORDER — MEPERIDINE HYDROCHLORIDE 50 MG/ML
12.5 INJECTION INTRAMUSCULAR; INTRAVENOUS; SUBCUTANEOUS ONCE
Status: CANCELLED | OUTPATIENT
Start: 2018-10-18 | End: 2018-10-18

## 2018-09-27 RX ORDER — MEPERIDINE HYDROCHLORIDE 50 MG/ML
12.5 INJECTION INTRAMUSCULAR; INTRAVENOUS; SUBCUTANEOUS ONCE
Status: CANCELLED | OUTPATIENT
Start: 2018-09-27 | End: 2018-09-27

## 2018-09-27 RX ORDER — 0.9 % SODIUM CHLORIDE 0.9 %
10 VIAL (ML) INJECTION ONCE
Status: CANCELLED | OUTPATIENT
Start: 2018-10-18 | End: 2018-10-18

## 2018-09-27 RX ORDER — DEXAMETHASONE SODIUM PHOSPHATE 10 MG/ML
10 INJECTION INTRAMUSCULAR; INTRAVENOUS ONCE
Status: COMPLETED | OUTPATIENT
Start: 2018-09-27 | End: 2018-09-27

## 2018-09-27 RX ORDER — 0.9 % SODIUM CHLORIDE 0.9 %
10 VIAL (ML) INJECTION ONCE
Status: CANCELLED | OUTPATIENT
Start: 2018-11-08 | End: 2018-11-08

## 2018-09-27 RX ORDER — SODIUM CHLORIDE 0.9 % (FLUSH) 0.9 %
5 SYRINGE (ML) INJECTION PRN
Status: CANCELLED | OUTPATIENT
Start: 2018-09-27

## 2018-09-27 RX ORDER — 0.9 % SODIUM CHLORIDE 0.9 %
10 VIAL (ML) INJECTION ONCE
Status: CANCELLED | OUTPATIENT
Start: 2018-09-27 | End: 2018-09-27

## 2018-09-27 RX ORDER — METHYLPREDNISOLONE SODIUM SUCCINATE 125 MG/2ML
125 INJECTION, POWDER, LYOPHILIZED, FOR SOLUTION INTRAMUSCULAR; INTRAVENOUS ONCE
Status: CANCELLED | OUTPATIENT
Start: 2018-09-27 | End: 2018-09-27

## 2018-09-27 RX ORDER — SODIUM CHLORIDE 9 MG/ML
INJECTION, SOLUTION INTRAVENOUS CONTINUOUS
Status: CANCELLED | OUTPATIENT
Start: 2018-10-18

## 2018-09-27 RX ORDER — SODIUM CHLORIDE 9 MG/ML
INJECTION, SOLUTION INTRAVENOUS ONCE
Status: COMPLETED | OUTPATIENT
Start: 2018-09-27 | End: 2018-09-27

## 2018-09-27 RX ORDER — PALONOSETRON 0.05 MG/ML
0.25 INJECTION, SOLUTION INTRAVENOUS ONCE
Status: CANCELLED | OUTPATIENT
Start: 2018-10-18

## 2018-09-27 RX ORDER — METHYLPREDNISOLONE SODIUM SUCCINATE 125 MG/2ML
125 INJECTION, POWDER, LYOPHILIZED, FOR SOLUTION INTRAMUSCULAR; INTRAVENOUS ONCE
Status: CANCELLED | OUTPATIENT
Start: 2018-10-18 | End: 2018-10-18

## 2018-09-27 RX ORDER — PALONOSETRON 0.05 MG/ML
0.25 INJECTION, SOLUTION INTRAVENOUS ONCE
Status: CANCELLED | OUTPATIENT
Start: 2018-11-08

## 2018-09-27 RX ORDER — HEPARIN SODIUM (PORCINE) LOCK FLUSH IV SOLN 100 UNIT/ML 100 UNIT/ML
500 SOLUTION INTRAVENOUS PRN
Status: CANCELLED | OUTPATIENT
Start: 2018-10-18

## 2018-09-27 RX ORDER — SODIUM CHLORIDE 0.9 % (FLUSH) 0.9 %
5 SYRINGE (ML) INJECTION PRN
Status: CANCELLED | OUTPATIENT
Start: 2018-11-08

## 2018-09-27 RX ORDER — METHYLPREDNISOLONE SODIUM SUCCINATE 125 MG/2ML
125 INJECTION, POWDER, LYOPHILIZED, FOR SOLUTION INTRAMUSCULAR; INTRAVENOUS ONCE
Status: CANCELLED | OUTPATIENT
Start: 2018-11-08 | End: 2018-11-08

## 2018-09-27 RX ORDER — SODIUM CHLORIDE 0.9 % (FLUSH) 0.9 %
5 SYRINGE (ML) INJECTION PRN
Status: CANCELLED | OUTPATIENT
Start: 2018-10-18

## 2018-09-27 RX ORDER — MEPERIDINE HYDROCHLORIDE 50 MG/ML
12.5 INJECTION INTRAMUSCULAR; INTRAVENOUS; SUBCUTANEOUS ONCE
Status: CANCELLED | OUTPATIENT
Start: 2018-11-08 | End: 2018-11-08

## 2018-09-27 RX ORDER — SODIUM CHLORIDE 9 MG/ML
INJECTION, SOLUTION INTRAVENOUS ONCE
Status: CANCELLED | OUTPATIENT
Start: 2018-10-18 | End: 2018-10-18

## 2018-09-27 RX ORDER — PALONOSETRON 0.05 MG/ML
0.25 INJECTION, SOLUTION INTRAVENOUS ONCE
Status: COMPLETED | OUTPATIENT
Start: 2018-09-27 | End: 2018-09-27

## 2018-09-27 RX ORDER — HEPARIN SODIUM (PORCINE) LOCK FLUSH IV SOLN 100 UNIT/ML 100 UNIT/ML
500 SOLUTION INTRAVENOUS PRN
Status: CANCELLED | OUTPATIENT
Start: 2018-11-08

## 2018-09-27 RX ORDER — HEPARIN SODIUM (PORCINE) LOCK FLUSH IV SOLN 100 UNIT/ML 100 UNIT/ML
500 SOLUTION INTRAVENOUS PRN
Status: DISCONTINUED | OUTPATIENT
Start: 2018-09-27 | End: 2018-09-28 | Stop reason: HOSPADM

## 2018-09-27 RX ORDER — SODIUM CHLORIDE 9 MG/ML
INJECTION, SOLUTION INTRAVENOUS CONTINUOUS
Status: CANCELLED | OUTPATIENT
Start: 2018-09-27

## 2018-09-27 RX ORDER — DIPHENHYDRAMINE HYDROCHLORIDE 50 MG/ML
50 INJECTION INTRAMUSCULAR; INTRAVENOUS ONCE
Status: CANCELLED | OUTPATIENT
Start: 2018-11-08 | End: 2018-11-08

## 2018-09-27 RX ORDER — SODIUM CHLORIDE 9 MG/ML
INJECTION, SOLUTION INTRAVENOUS ONCE
Status: CANCELLED | OUTPATIENT
Start: 2018-11-08 | End: 2018-11-08

## 2018-09-27 RX ORDER — SODIUM CHLORIDE 0.9 % (FLUSH) 0.9 %
10 SYRINGE (ML) INJECTION PRN
Status: DISCONTINUED | OUTPATIENT
Start: 2018-09-27 | End: 2018-09-28 | Stop reason: HOSPADM

## 2018-09-27 RX ORDER — SODIUM CHLORIDE 0.9 % (FLUSH) 0.9 %
10 SYRINGE (ML) INJECTION PRN
Status: CANCELLED | OUTPATIENT
Start: 2018-11-08

## 2018-09-27 RX ORDER — SODIUM CHLORIDE 0.9 % (FLUSH) 0.9 %
10 SYRINGE (ML) INJECTION PRN
Status: CANCELLED | OUTPATIENT
Start: 2018-10-18

## 2018-09-27 RX ADMIN — DOCETAXEL ANHYDROUS 136 MG: 10 INJECTION, SOLUTION INTRAVENOUS at 13:35

## 2018-09-27 RX ADMIN — TRASTUZUMAB 450 MG: 150 INJECTION, POWDER, LYOPHILIZED, FOR SOLUTION INTRAVENOUS at 12:58

## 2018-09-27 RX ADMIN — PALONOSETRON 0.25 MG: 0.05 INJECTION, SOLUTION INTRAVENOUS at 10:56

## 2018-09-27 RX ADMIN — PEGFILGRASTIM 6 MG: KIT SUBCUTANEOUS at 15:27

## 2018-09-27 RX ADMIN — Medication 10 ML: at 10:10

## 2018-09-27 RX ADMIN — Medication 10 ML: at 15:51

## 2018-09-27 RX ADMIN — SODIUM CHLORIDE 150 MG: 900 INJECTION, SOLUTION INTRAVENOUS at 11:19

## 2018-09-27 RX ADMIN — SODIUM CHLORIDE: 9 INJECTION, SOLUTION INTRAVENOUS at 10:55

## 2018-09-27 RX ADMIN — Medication 10 ML: at 10:09

## 2018-09-27 RX ADMIN — SODIUM CHLORIDE, PRESERVATIVE FREE 500 UNITS: 5 INJECTION INTRAVENOUS at 15:51

## 2018-09-27 RX ADMIN — DEXAMETHASONE SODIUM PHOSPHATE 10 MG: 10 INJECTION INTRAMUSCULAR; INTRAVENOUS at 11:01

## 2018-09-27 RX ADMIN — CARBOPLATIN 600 MG: 10 INJECTION, SOLUTION INTRAVENOUS at 14:38

## 2018-09-27 RX ADMIN — PERTUZUMAB 420 MG: 30 INJECTION, SOLUTION, CONCENTRATE INTRAVENOUS at 11:55

## 2018-09-27 NOTE — PROGRESS NOTES
Pt here for C.2D1. Denies any new complaints. Labs drawn from port and results reviewed. Dr Sammi Barriga at chairside. Pt was treated without incident and d/c'd in stable condition. Toshia Malhotra will return 10/18 for treatment and Dr bolden.

## 2018-09-27 NOTE — TELEPHONE ENCOUNTER
checked in with patient during treatment. Patient was accompanied by daughter. Devon Boyd stated she would send  documentation about scan that was not covered by insurance.  will continue to check in with patient.

## 2018-09-27 NOTE — LETTER
oil-petrolatum, montelukast, proair hfa, olmesartan-hydrochlorothiazide, alprazolam, dexamethasone, lidocaine-prilocaine, omeprazole, ondansetron, cyclobenzaprine, atorvastatin, and cetirizine hcl, and the following Facility-Administered Medications: sodium chloride, fosaprepitant (EMEND) 150 mg in sodium chloride 0.9 % 150 mL IVPB, palonosetron, dexamethasone, pertuzumab (PERJETA) 420 mg in sodium chloride 0.9 % 250 mL chemo IVPB, sodium chloride flush, and heparin flush. ALLERGIES:  is allergic to dye [iodides]; eggs or egg-derived products; and shellfish-derived products. FAMILY HISTORY: family history includes Arthritis in her mother; Breast Cancer in her paternal grandmother; Cancer in her father and sister; Margie Zee in an other family member; Other in her father. Specific oncological history in the family including risks cancer in her paternal grandmother who was in her 45s underwent mastectomy. Her father had lung cancer and her sister had melanoma. Colon cancer is in the distant family member    SOCIAL HISTORY:  reports that she has been smoking Cigarettes. She has been smoking about 1.00 pack per day. She has never used smokeless tobacco. She reports that she drinks alcohol. She reports that she does not use drugs. REVIEW OF SYSTEMS:   General: No fever or night sweats. Weight is stable. ENT: No double or blurred vision, no tinnitus or hearing problem, no dysphagia or sore throat. Minor mucosits  Respiratory: No chest pain, no shortness of breath, no cough or hemoptysis. Cardiovascular: Denies chest pain, PND or orthopnea. No L E swelling or palpitations. Gastrointestinal: No vomiting, abdominal pain, constipation. +nausea, diarrhea  Genitourinary: Denies hematuria, frequency, urgency or incontinence. Skin irritation in the perineum   Neurological: Denies headaches, decreased LOC, no sensory or motor focal deficits. Musculoskeletal: No arthralgia no back pain or joint swelling.

## 2018-10-08 ENCOUNTER — TELEPHONE (OUTPATIENT)
Dept: FAMILY MEDICINE CLINIC | Age: 53
End: 2018-10-08

## 2018-10-09 ENCOUNTER — TELEPHONE (OUTPATIENT)
Dept: ONCOLOGY | Age: 53
End: 2018-10-09

## 2018-10-09 DIAGNOSIS — R21 SKIN RASH: Primary | ICD-10-CM

## 2018-10-10 ENCOUNTER — OFFICE VISIT (OUTPATIENT)
Dept: DERMATOLOGY | Age: 53
End: 2018-10-10
Payer: COMMERCIAL

## 2018-10-10 ENCOUNTER — TELEPHONE (OUTPATIENT)
Dept: DERMATOLOGY | Age: 53
End: 2018-10-10

## 2018-10-10 VITALS
OXYGEN SATURATION: 97 % | WEIGHT: 159 LBS | SYSTOLIC BLOOD PRESSURE: 99 MMHG | DIASTOLIC BLOOD PRESSURE: 66 MMHG | HEIGHT: 63 IN | BODY MASS INDEX: 28.17 KG/M2 | HEART RATE: 118 BPM

## 2018-10-10 DIAGNOSIS — L56.0 PHOTOTOXIC DRUG ERUPTION: Primary | ICD-10-CM

## 2018-10-10 DIAGNOSIS — L03.039 PARONYCHIA OF TOE, UNSPECIFIED LATERALITY: ICD-10-CM

## 2018-10-10 DIAGNOSIS — L27.1 HAND FOOT SYNDROME: ICD-10-CM

## 2018-10-10 PROCEDURE — G8484 FLU IMMUNIZE NO ADMIN: HCPCS | Performed by: DERMATOLOGY

## 2018-10-10 PROCEDURE — 3017F COLORECTAL CA SCREEN DOC REV: CPT | Performed by: DERMATOLOGY

## 2018-10-10 PROCEDURE — 99203 OFFICE O/P NEW LOW 30 MIN: CPT | Performed by: DERMATOLOGY

## 2018-10-10 PROCEDURE — G8427 DOCREV CUR MEDS BY ELIG CLIN: HCPCS | Performed by: DERMATOLOGY

## 2018-10-10 PROCEDURE — 4004F PT TOBACCO SCREEN RCVD TLK: CPT | Performed by: DERMATOLOGY

## 2018-10-10 PROCEDURE — G8417 CALC BMI ABV UP PARAM F/U: HCPCS | Performed by: DERMATOLOGY

## 2018-10-10 RX ORDER — CLOBETASOL PROPIONATE 0.5 MG/G
OINTMENT TOPICAL
Qty: 30 G | Refills: 2 | Status: SHIPPED | OUTPATIENT
Start: 2018-10-10 | End: 2020-12-10

## 2018-10-10 RX ORDER — TACROLIMUS 1 MG/G
OINTMENT TOPICAL
Qty: 30 G | Refills: 2 | Status: SHIPPED | OUTPATIENT
Start: 2018-10-10

## 2018-10-10 RX ORDER — TRIAMCINOLONE ACETONIDE 1 MG/G
CREAM TOPICAL
Qty: 80 G | Refills: 1 | Status: SHIPPED | OUTPATIENT
Start: 2018-10-10 | End: 2018-11-27 | Stop reason: SDUPTHER

## 2018-10-10 NOTE — TELEPHONE ENCOUNTER
Pt went to pharmacy to  cream Dr. Fifi Gutierrez prescribed to her. Insurance was not able to accept it. May need PA. Please address.

## 2018-10-15 ENCOUNTER — TELEPHONE (OUTPATIENT)
Dept: ONCOLOGY | Age: 53
End: 2018-10-15

## 2018-10-18 ENCOUNTER — OFFICE VISIT (OUTPATIENT)
Dept: ONCOLOGY | Age: 53
End: 2018-10-18
Payer: COMMERCIAL

## 2018-10-18 ENCOUNTER — TELEPHONE (OUTPATIENT)
Dept: ONCOLOGY | Age: 53
End: 2018-10-18

## 2018-10-18 ENCOUNTER — HOSPITAL ENCOUNTER (OUTPATIENT)
Dept: INFUSION THERAPY | Age: 53
Discharge: HOME | End: 2018-10-18
Payer: COMMERCIAL

## 2018-10-18 VITALS
SYSTOLIC BLOOD PRESSURE: 124 MMHG | WEIGHT: 160.8 LBS | TEMPERATURE: 97.9 F | BODY MASS INDEX: 28.48 KG/M2 | HEART RATE: 125 BPM | DIASTOLIC BLOOD PRESSURE: 78 MMHG | RESPIRATION RATE: 18 BRPM

## 2018-10-18 DIAGNOSIS — C50.411 MALIGNANT NEOPLASM OF UPPER-OUTER QUADRANT OF RIGHT BREAST IN FEMALE, ESTROGEN RECEPTOR POSITIVE (HCC): ICD-10-CM

## 2018-10-18 DIAGNOSIS — C50.411 MALIGNANT NEOPLASM OF UPPER-OUTER QUADRANT OF RIGHT BREAST IN FEMALE, ESTROGEN RECEPTOR POSITIVE (HCC): Primary | ICD-10-CM

## 2018-10-18 DIAGNOSIS — Z17.0 MALIGNANT NEOPLASM OF UPPER-OUTER QUADRANT OF RIGHT BREAST IN FEMALE, ESTROGEN RECEPTOR POSITIVE (HCC): Primary | ICD-10-CM

## 2018-10-18 DIAGNOSIS — Z17.0 MALIGNANT NEOPLASM OF UPPER-OUTER QUADRANT OF RIGHT BREAST IN FEMALE, ESTROGEN RECEPTOR POSITIVE (HCC): ICD-10-CM

## 2018-10-18 LAB
ABSOLUTE EOS #: 0 K/UL (ref 0–0.4)
ABSOLUTE IMMATURE GRANULOCYTE: ABNORMAL K/UL (ref 0–0.3)
ABSOLUTE LYMPH #: 0.6 K/UL (ref 1–4.8)
ABSOLUTE MONO #: 0.2 K/UL (ref 0.1–1.2)
ALBUMIN SERPL-MCNC: 4.2 G/DL (ref 3.5–5.2)
ALBUMIN/GLOBULIN RATIO: 1.6 (ref 1–2.5)
ALP BLD-CCNC: 69 U/L (ref 35–104)
ALT SERPL-CCNC: 16 U/L (ref 5–33)
ANION GAP SERPL CALCULATED.3IONS-SCNC: 18 MMOL/L (ref 9–17)
AST SERPL-CCNC: 16 U/L
BASOPHILS # BLD: 0 % (ref 0–2)
BASOPHILS ABSOLUTE: 0 K/UL (ref 0–0.2)
BILIRUB SERPL-MCNC: 0.38 MG/DL (ref 0.3–1.2)
BUN BLDV-MCNC: 16 MG/DL (ref 6–20)
BUN/CREAT BLD: ABNORMAL (ref 9–20)
CALCIUM SERPL-MCNC: 9.8 MG/DL (ref 8.6–10.4)
CHLORIDE BLD-SCNC: 97 MMOL/L (ref 98–107)
CO2: 21 MMOL/L (ref 20–31)
CREAT SERPL-MCNC: 0.62 MG/DL (ref 0.5–0.9)
DIFFERENTIAL TYPE: ABNORMAL
EOSINOPHILS RELATIVE PERCENT: 0 % (ref 1–4)
GFR AFRICAN AMERICAN: >60 ML/MIN
GFR NON-AFRICAN AMERICAN: >60 ML/MIN
GFR SERPL CREATININE-BSD FRML MDRD: ABNORMAL ML/MIN/{1.73_M2}
GFR SERPL CREATININE-BSD FRML MDRD: ABNORMAL ML/MIN/{1.73_M2}
GLUCOSE BLD-MCNC: 175 MG/DL (ref 70–99)
HCT VFR BLD CALC: 36.8 % (ref 36–46)
HEMOGLOBIN: 12.7 G/DL (ref 12–16)
IMMATURE GRANULOCYTES: ABNORMAL %
LYMPHOCYTES # BLD: 9 % (ref 24–44)
MCH RBC QN AUTO: 33.9 PG (ref 26–34)
MCHC RBC AUTO-ENTMCNC: 34.6 G/DL (ref 31–37)
MCV RBC AUTO: 98 FL (ref 80–100)
MONOCYTES # BLD: 3 % (ref 2–11)
NRBC AUTOMATED: ABNORMAL PER 100 WBC
PDW BLD-RTO: 15.8 % (ref 12.5–15.4)
PLATELET # BLD: 298 K/UL (ref 140–450)
PLATELET ESTIMATE: ABNORMAL
PMV BLD AUTO: 8.5 FL (ref 6–12)
POTASSIUM SERPL-SCNC: 3.8 MMOL/L (ref 3.7–5.3)
RBC # BLD: 3.75 M/UL (ref 4–5.2)
RBC # BLD: ABNORMAL 10*6/UL
SEG NEUTROPHILS: 88 % (ref 36–66)
SEGMENTED NEUTROPHILS ABSOLUTE COUNT: 6.1 K/UL (ref 1.8–7.7)
SODIUM BLD-SCNC: 136 MMOL/L (ref 135–144)
TOTAL PROTEIN: 6.8 G/DL (ref 6.4–8.3)
WBC # BLD: 6.8 K/UL (ref 3.5–11)
WBC # BLD: ABNORMAL 10*3/UL

## 2018-10-18 PROCEDURE — 2580000003 HC RX 258: Performed by: INTERNAL MEDICINE

## 2018-10-18 PROCEDURE — G8484 FLU IMMUNIZE NO ADMIN: HCPCS | Performed by: INTERNAL MEDICINE

## 2018-10-18 PROCEDURE — 85025 COMPLETE CBC W/AUTO DIFF WBC: CPT

## 2018-10-18 PROCEDURE — 96367 TX/PROPH/DG ADDL SEQ IV INF: CPT

## 2018-10-18 PROCEDURE — 80053 COMPREHEN METABOLIC PANEL: CPT

## 2018-10-18 PROCEDURE — 3017F COLORECTAL CA SCREEN DOC REV: CPT | Performed by: INTERNAL MEDICINE

## 2018-10-18 PROCEDURE — 6360000002 HC RX W HCPCS: Performed by: INTERNAL MEDICINE

## 2018-10-18 PROCEDURE — G8417 CALC BMI ABV UP PARAM F/U: HCPCS | Performed by: INTERNAL MEDICINE

## 2018-10-18 PROCEDURE — 96413 CHEMO IV INFUSION 1 HR: CPT

## 2018-10-18 PROCEDURE — 96375 TX/PRO/DX INJ NEW DRUG ADDON: CPT

## 2018-10-18 PROCEDURE — 36591 DRAW BLOOD OFF VENOUS DEVICE: CPT

## 2018-10-18 PROCEDURE — G8428 CUR MEDS NOT DOCUMENT: HCPCS | Performed by: INTERNAL MEDICINE

## 2018-10-18 PROCEDURE — 4004F PT TOBACCO SCREEN RCVD TLK: CPT | Performed by: INTERNAL MEDICINE

## 2018-10-18 PROCEDURE — 99214 OFFICE O/P EST MOD 30 MIN: CPT | Performed by: INTERNAL MEDICINE

## 2018-10-18 PROCEDURE — 96417 CHEMO IV INFUS EACH ADDL SEQ: CPT

## 2018-10-18 PROCEDURE — 96377 APPLICATON ON-BODY INJECTOR: CPT

## 2018-10-18 RX ORDER — SODIUM CHLORIDE 0.9 % (FLUSH) 0.9 %
5 SYRINGE (ML) INJECTION PRN
Status: CANCELLED | OUTPATIENT
Start: 2018-11-29

## 2018-10-18 RX ORDER — 0.9 % SODIUM CHLORIDE 0.9 %
10 VIAL (ML) INJECTION ONCE
Status: CANCELLED | OUTPATIENT
Start: 2018-12-27 | End: 2018-12-20

## 2018-10-18 RX ORDER — HEPARIN SODIUM (PORCINE) LOCK FLUSH IV SOLN 100 UNIT/ML 100 UNIT/ML
500 SOLUTION INTRAVENOUS PRN
Status: DISCONTINUED | OUTPATIENT
Start: 2018-10-18 | End: 2018-10-19 | Stop reason: HOSPADM

## 2018-10-18 RX ORDER — PALONOSETRON 0.05 MG/ML
0.25 INJECTION, SOLUTION INTRAVENOUS ONCE
Status: CANCELLED | OUTPATIENT
Start: 2018-11-29

## 2018-10-18 RX ORDER — MEPERIDINE HYDROCHLORIDE 50 MG/ML
12.5 INJECTION INTRAMUSCULAR; INTRAVENOUS; SUBCUTANEOUS ONCE
Status: CANCELLED | OUTPATIENT
Start: 2018-11-29 | End: 2018-11-29

## 2018-10-18 RX ORDER — 0.9 % SODIUM CHLORIDE 0.9 %
10 VIAL (ML) INJECTION ONCE
Status: CANCELLED | OUTPATIENT
Start: 2018-11-29 | End: 2018-11-29

## 2018-10-18 RX ORDER — HEPARIN SODIUM (PORCINE) LOCK FLUSH IV SOLN 100 UNIT/ML 100 UNIT/ML
500 SOLUTION INTRAVENOUS PRN
Status: CANCELLED | OUTPATIENT
Start: 2018-11-29

## 2018-10-18 RX ORDER — DIPHENHYDRAMINE HYDROCHLORIDE 50 MG/ML
50 INJECTION INTRAMUSCULAR; INTRAVENOUS ONCE
Status: CANCELLED | OUTPATIENT
Start: 2018-11-29 | End: 2018-11-29

## 2018-10-18 RX ORDER — SODIUM CHLORIDE 9 MG/ML
INJECTION, SOLUTION INTRAVENOUS CONTINUOUS
Status: CANCELLED | OUTPATIENT
Start: 2018-12-27

## 2018-10-18 RX ORDER — DIPHENHYDRAMINE HYDROCHLORIDE 50 MG/ML
50 INJECTION INTRAMUSCULAR; INTRAVENOUS ONCE
Status: CANCELLED | OUTPATIENT
Start: 2018-12-27 | End: 2018-12-20

## 2018-10-18 RX ORDER — SODIUM CHLORIDE 0.9 % (FLUSH) 0.9 %
10 SYRINGE (ML) INJECTION PRN
Status: CANCELLED | OUTPATIENT
Start: 2018-11-29

## 2018-10-18 RX ORDER — DEXAMETHASONE SODIUM PHOSPHATE 10 MG/ML
10 INJECTION INTRAMUSCULAR; INTRAVENOUS ONCE
Status: COMPLETED | OUTPATIENT
Start: 2018-10-18 | End: 2018-10-18

## 2018-10-18 RX ORDER — HEPARIN SODIUM (PORCINE) LOCK FLUSH IV SOLN 100 UNIT/ML 100 UNIT/ML
500 SOLUTION INTRAVENOUS PRN
Status: CANCELLED | OUTPATIENT
Start: 2018-12-27

## 2018-10-18 RX ORDER — SODIUM CHLORIDE 0.9 % (FLUSH) 0.9 %
10 SYRINGE (ML) INJECTION PRN
Status: DISCONTINUED | OUTPATIENT
Start: 2018-10-18 | End: 2018-10-19 | Stop reason: HOSPADM

## 2018-10-18 RX ORDER — METHYLPREDNISOLONE SODIUM SUCCINATE 125 MG/2ML
125 INJECTION, POWDER, LYOPHILIZED, FOR SOLUTION INTRAMUSCULAR; INTRAVENOUS ONCE
Status: CANCELLED | OUTPATIENT
Start: 2018-11-29 | End: 2018-11-29

## 2018-10-18 RX ORDER — SODIUM CHLORIDE 0.9 % (FLUSH) 0.9 %
10 SYRINGE (ML) INJECTION PRN
Status: CANCELLED | OUTPATIENT
Start: 2018-12-27

## 2018-10-18 RX ORDER — SODIUM CHLORIDE 9 MG/ML
INJECTION, SOLUTION INTRAVENOUS ONCE
Status: CANCELLED | OUTPATIENT
Start: 2018-11-29 | End: 2018-11-29

## 2018-10-18 RX ORDER — SODIUM CHLORIDE 9 MG/ML
INJECTION, SOLUTION INTRAVENOUS ONCE
Status: CANCELLED | OUTPATIENT
Start: 2018-12-27 | End: 2018-12-20

## 2018-10-18 RX ORDER — SODIUM CHLORIDE 9 MG/ML
INJECTION, SOLUTION INTRAVENOUS ONCE
Status: COMPLETED | OUTPATIENT
Start: 2018-10-18 | End: 2018-10-18

## 2018-10-18 RX ORDER — PALONOSETRON 0.05 MG/ML
0.25 INJECTION, SOLUTION INTRAVENOUS ONCE
Status: CANCELLED | OUTPATIENT
Start: 2018-12-27

## 2018-10-18 RX ORDER — SODIUM CHLORIDE 9 MG/ML
INJECTION, SOLUTION INTRAVENOUS CONTINUOUS
Status: CANCELLED | OUTPATIENT
Start: 2018-11-29

## 2018-10-18 RX ORDER — PALONOSETRON 0.05 MG/ML
0.25 INJECTION, SOLUTION INTRAVENOUS ONCE
Status: COMPLETED | OUTPATIENT
Start: 2018-10-18 | End: 2018-10-18

## 2018-10-18 RX ORDER — SODIUM CHLORIDE 0.9 % (FLUSH) 0.9 %
5 SYRINGE (ML) INJECTION PRN
Status: CANCELLED | OUTPATIENT
Start: 2018-12-27

## 2018-10-18 RX ORDER — METHYLPREDNISOLONE SODIUM SUCCINATE 125 MG/2ML
125 INJECTION, POWDER, LYOPHILIZED, FOR SOLUTION INTRAMUSCULAR; INTRAVENOUS ONCE
Status: CANCELLED | OUTPATIENT
Start: 2018-12-27 | End: 2018-12-20

## 2018-10-18 RX ORDER — MEPERIDINE HYDROCHLORIDE 50 MG/ML
12.5 INJECTION INTRAMUSCULAR; INTRAVENOUS; SUBCUTANEOUS ONCE
Status: CANCELLED | OUTPATIENT
Start: 2018-12-27 | End: 2018-12-20

## 2018-10-18 RX ADMIN — TRASTUZUMAB 450 MG: 150 INJECTION, POWDER, LYOPHILIZED, FOR SOLUTION INTRAVENOUS at 12:52

## 2018-10-18 RX ADMIN — SODIUM CHLORIDE: 9 INJECTION, SOLUTION INTRAVENOUS at 10:51

## 2018-10-18 RX ADMIN — DOCETAXEL ANHYDROUS 136 MG: 10 INJECTION, SOLUTION INTRAVENOUS at 13:32

## 2018-10-18 RX ADMIN — SODIUM CHLORIDE 150 MG: 900 INJECTION, SOLUTION INTRAVENOUS at 11:02

## 2018-10-18 RX ADMIN — Medication 10 ML: at 10:51

## 2018-10-18 RX ADMIN — Medication 10 MG: at 10:53

## 2018-10-18 RX ADMIN — Medication 10 ML: at 14:47

## 2018-10-18 RX ADMIN — PERTUZUMAB 420 MG: 30 INJECTION, SOLUTION, CONCENTRATE INTRAVENOUS at 11:46

## 2018-10-18 RX ADMIN — SODIUM CHLORIDE, PRESERVATIVE FREE 500 UNITS: 5 INJECTION INTRAVENOUS at 14:47

## 2018-10-18 RX ADMIN — PALONOSETRON 0.25 MG: 0.05 INJECTION, SOLUTION INTRAVENOUS at 10:52

## 2018-10-18 RX ADMIN — PEGFILGRASTIM 6 MG: KIT SUBCUTANEOUS at 14:28

## 2018-10-18 ASSESSMENT — PAIN DESCRIPTION - PAIN TYPE: TYPE: ACUTE PAIN

## 2018-10-18 ASSESSMENT — PAIN DESCRIPTION - ORIENTATION: ORIENTATION: RIGHT

## 2018-10-18 ASSESSMENT — PAIN SCALES - GENERAL: PAINLEVEL_OUTOF10: 8

## 2018-10-18 ASSESSMENT — PAIN DESCRIPTION - LOCATION: LOCATION: BREAST

## 2018-10-18 NOTE — TELEPHONE ENCOUNTER
checked in with patient during treatment. Fredis Vo was accompanied by her daughter today. Fredis Vo reports doing well between treatments but has been fatigued. Fredis Vo brought in denial letters from her insurance provider for her CT scan of her thorax/chest on July 13, 2018. Fredis Vo reports her initial appeal was denied but is able to appeal again. Fredis Vo states her PCP office had ordered the CT.  will call office to see if extra documentation can be added to appeal for patient.  discussed [employer] paperwork with patient and daughter.  will check in at next appointment or call if paperwork or action is needed from patient.

## 2018-10-18 NOTE — PROGRESS NOTES
DIAGNOSIS:   1. Right-sided breast cancer, multifocal  2. Tumor is HER-2 and ER/MO positive  3. Evidence of gustabo metastases in the axilla  4. Staging studies show cervical adenopathy with PET activity in the neck, however, all biopsies came back negative for metastatic disease. CURRENT THERAPY:  1. Plan induction chemotherapy with TCHP followed by surgery     BRIEF CASE HISTORY:   Artur Burgos is a very pleasant 46 y.o. female who is referred to us for management recommendation about her breast cancer. She presented with mass and swelling in her right breast and axillary area. She was having significant pain so she underwent a CT scan initially than that showed significant mass in her right breast with extensive axillary adenopathy. He was clearly thickening of the skin of the breast suggestive of infiltration. The patient underwent mammogram that confirmed the presence of multifocal breast masses is highly suggestive of malignancy. She underwent image guided biopsy and that confirmed the presence of grade 2 invasive ductal carcinoma that was ER/MO and HER-2/keyona positive. The patient underwent breast MRI that showed multifocal disease with involvement of the nipple. There is no clear involvement of the muscle or the skin of the breast, there is multiple axillary nodes but there is no involvement of internal mammary lymph node or contralateral breast or lymph nodes. I reviewed the CT scan of the chest and she had a CT scan of the abdomen and pelvis a couple months ago because of abdominal pain and that showed no clear evidence of metastatic disease. She is sent to us for a consultation, she presents today accompanied by her family. She is alert and oriented and well informed about her condition. She is very anxious but overall is healthy and has minimal morbidity. Performance status is ECOG 0, most of her pain is related to the mass, axillary nodes as well as the biopsy area.   She does not in the breast as noted above  Skin: photosensitivity rash on arm - resolving; hand-foot from treatment  Psychiatric:  No anxiety, no depression. Mood swings  Endocrine: No diabetes or thyroid disease. Hematologic: No bleeding, no adenopathy. PHYSICAL EXAM: Shows a well appearing 46y.o.-year-old female who is not in pain or distress. Vital Signs: not currently breastfeeding. HEENT: Normocephalic and atraumatic. Pupils are equal, round, reactive to light and accommodation. Extraocular muscles are intact. Neck: Showed no JVD, no carotid bruit . Lungs: Clear to auscultation bilaterally. Heart: Regular without any murmur. Abdomen: Soft, nontender. No hepatosplenomegaly. Extremities: Lower extremities show no edema, clubbing, or cyanosis. Breasts: Examination showed significant reduction in the mass of the breast about 20%. No palpable lymph nodes appreciated. Neuro exam: intact cranial nerves bilaterally no motor or sensory deficit, gait is normal. Lymphatic: no adenopathy appreciated in the supraclavicular, axillary, cervical or inguinal area     REVIEW OF LABORATORY DATA:   Lab Results   Component Value Date    WBC 6.8 10/18/2018    HGB 12.7 10/18/2018    HCT 36.8 10/18/2018    MCV 98.0 10/18/2018     10/18/2018        Chemistry        Component Value Date/Time     09/27/2018 1005    K 3.6 (L) 09/27/2018 1005     09/27/2018 1005    CO2 26 09/27/2018 1005    BUN 14 09/27/2018 1005    CREATININE 0.47 (L) 09/27/2018 1005        Component Value Date/Time    CALCIUM 9.9 09/27/2018 1005    ALKPHOS 71 09/27/2018 1005    AST 17 09/27/2018 1005    ALT 16 09/27/2018 1005    BILITOT 0.23 (L) 09/27/2018 1005            REVIEW OF RADIOLOGICAL RESULTS:       IMPRESSION:   1. Clinical stage [id number] triple positive breast cancer  2. Cervical adenopathy, for evaluation and biopsy showed no evidence of metastatic disease  3. Undergoing induction chemotherapy  4.  Chemotherapy induced diarrhea complicated by skin irritation  5. Hand-foot syndrome, toxicity    PLAN:   1. We reviewed her current lab work. 2. I completed toxicity check - plan to ice hands and feet during treatment to minimize toxicity. 3. We will drop carboplatin from future chemo considering her severe toxicity   4. I discussed plan for interim imaging  following cycle #3 to assess response. 5. Return in 3 weeks for cycle #4 and to review imaging .

## 2018-10-18 NOTE — PROGRESS NOTES
Pt here for C3D1 Perjeta, Herceptin, Taxotere, Carbo. Pt seen by Dr Izabel Gallo at chair side for follow up, refer to his note. Labs drawn from port and results reviewed. Pt had questions about genetic testing, Jonh Moscoso notified and will come to see pt. Per Bonbarrett Gallo is holding Carbo this cycle. Pt was treated without incident and d/c'd in stable condition with Neulasta OBI. Pt will return in 3 weeks for C4D1.

## 2018-10-23 ENCOUNTER — TELEPHONE (OUTPATIENT)
Dept: ONCOLOGY | Age: 53
End: 2018-10-23

## 2018-10-23 DIAGNOSIS — Z17.0 MALIGNANT NEOPLASM OF UPPER-OUTER QUADRANT OF RIGHT BREAST IN FEMALE, ESTROGEN RECEPTOR POSITIVE (HCC): Primary | ICD-10-CM

## 2018-10-23 DIAGNOSIS — C50.411 MALIGNANT NEOPLASM OF UPPER-OUTER QUADRANT OF RIGHT BREAST IN FEMALE, ESTROGEN RECEPTOR POSITIVE (HCC): Primary | ICD-10-CM

## 2018-10-23 NOTE — TELEPHONE ENCOUNTER
called Dr. Solomon Oas office who ordered denied CT Chest/Thorax scan.  talked with Jeremiah Reynoso who faxed over progress note from encounter.  called Btety Barajas to discuss appeal for scan.  sent documentation to patient who stated she would send in appeal at a later time.  encouraged patient to call with any questions or concerns.

## 2018-10-24 ENCOUNTER — OFFICE VISIT (OUTPATIENT)
Dept: DERMATOLOGY | Age: 53
End: 2018-10-24
Payer: COMMERCIAL

## 2018-10-24 VITALS
WEIGHT: 156.2 LBS | DIASTOLIC BLOOD PRESSURE: 54 MMHG | BODY MASS INDEX: 27.68 KG/M2 | HEIGHT: 63 IN | SYSTOLIC BLOOD PRESSURE: 80 MMHG | OXYGEN SATURATION: 92 % | HEART RATE: 124 BPM

## 2018-10-24 DIAGNOSIS — R21 RASH: Primary | ICD-10-CM

## 2018-10-24 PROCEDURE — G8427 DOCREV CUR MEDS BY ELIG CLIN: HCPCS | Performed by: DERMATOLOGY

## 2018-10-24 PROCEDURE — 3017F COLORECTAL CA SCREEN DOC REV: CPT | Performed by: DERMATOLOGY

## 2018-10-24 PROCEDURE — G8417 CALC BMI ABV UP PARAM F/U: HCPCS | Performed by: DERMATOLOGY

## 2018-10-24 PROCEDURE — G8484 FLU IMMUNIZE NO ADMIN: HCPCS | Performed by: DERMATOLOGY

## 2018-10-24 PROCEDURE — 4004F PT TOBACCO SCREEN RCVD TLK: CPT | Performed by: DERMATOLOGY

## 2018-10-24 PROCEDURE — 99214 OFFICE O/P EST MOD 30 MIN: CPT | Performed by: DERMATOLOGY

## 2018-10-25 NOTE — PROGRESS NOTES
montelukast (SINGULAIR) 10 MG tablet take 1 tablet by mouth every evening 30 tablet 3    PROAIR  (90 Base) MCG/ACT inhaler inhale 2 puffs by mouth four times a day if needed 8.5 g 3    olmesartan-hydrochlorothiazide (BENICAR HCT) 20-12.5 MG per tablet Take 1 tablet by mouth daily 30 tablet 3    ALPRAZolam (XANAX) 0.25 MG tablet Take 0.25 mg by mouth nightly as needed for Sleep. [place] dexamethasone (DECADRON) 4 MG tablet Take 1 tab by mouth (with food) twice daily the day prior and the day after chemo 20 tablet 1    lidocaine-prilocaine (EMLA) 2.5-2.5 % cream Apply topically Daily as needed. 1 Tube 1    omeprazole (PRILOSEC) 20 MG delayed release capsule Take 1 capsule by mouth Daily 30 capsule 3    ondansetron (ZOFRAN ODT) 8 MG disintegrating tablet Take 1 tablet by mouth every 8 hours as needed for Nausea or Vomiting 30 tablet 3    cyclobenzaprine (FLEXERIL) 5 MG tablet Take 1 tablet by mouth 3 times daily as needed for Muscle spasms 30 tablet 3    atorvastatin (LIPITOR) 10 MG tablet TAKE 1 TABLET BY MOUTH ONE TIME A DAY  90 tablet 2    cetirizine HCl (ZYRTEC) 5 MG/5ML SYRP Take 5 mg by mouth daily       No current facility-administered medications for this visit. ALLERGIES:   Allergies   Allergen Reactions    Dye [Iodides] Anaphylaxis    Eggs Or Egg-Derived Products Anaphylaxis    Shellfish-Derived Products Anaphylaxis, Shortness Of Breath and Swelling       SOCIAL HISTORY:  Social History   Substance Use Topics    Smoking status: Current Every Day Smoker     Packs/day: 1.00     Types: Cigarettes    Smokeless tobacco: Never Used    Alcohol use No      Comment: social       REVIEW OF SYSTEMS:  Review of Systems   Constitutional: Negative.       Skin:Denies any new changing, growing or bleeding lesions or rashes except as described in the HPI     PHYSICAL EXAM:   BP (!) 80/54 (Site: Left Upper Arm, Position: Sitting, Cuff Size: Medium Adult)   Pulse 124   Ht 5' 3\" (1.6 m)   Wt 156 lb 3.2 oz (70.9 kg)   SpO2 92%   BMI 27.67 kg/m²     General Exam:  General Appearance: No acute distress, Well nourished     Neuro: Alert and oriented to person, place and time  Psych: Normal affect   Lymph Node: Not performed    Cutaneous Exam: Performed as documented in clinic note below. Full skin,which includes the head/face, neck, both arms, chest, back, abdomen, both legs, genitalia and/or groin and/or buttocks, digits and/or nails, was examined. Pertinent Physical Exam Findings:  Physical Exam   Skin:            Medical Necessity of Exam Performed:   Widespread Rash    Additional Diagnostic Testing performed during exam: Not performed ,  Not performed    ASSESSMENT:   Diagnosis Orders   1. Rash         Plan of Action is as Follows:  Assessment 1. Rash - some areas improved, other areas new and other areas stable  For the groin area use the pro-topic and the (zinc oxidize) baby diaper paste . Use a thick(like frosting) coat of the diaper rash cream.   Continue the Protopic on the the eyelids. Continue the Triamcinolone,  Apply to rash on body twice daily (not face, armpit or groin). Continue the clobetasol (TEMOVATE) 0.05 % ointment, Apply to rash on hands twice daily (not face, armpit or groin). Use the white cotton gloves the week of chemo and a couple of days the week that you are not going to chemo. Continue cooling extremities during chemo    Follow up in 4 weeks. Patient Instructions   For the groin area use the pro-topic and the (zinc oxidize) baby diaper paste . Use a thick(like frosting) coat of the diaper rash cream.   Continue the Protopic on the the eyelids. Continue the Triamcinolone,  Apply to rash on body twice daily (not face, armpit or groin). Continue the clobetasol (TEMOVATE) 0.05 % ointment, Apply to rash on hands twice daily (not face, armpit or groin).     Use the white cotton gloves the week of chemo and a couple of days the week that you are not going to

## 2018-10-26 ENCOUNTER — TELEPHONE (OUTPATIENT)
Dept: FAMILY MEDICINE CLINIC | Age: 53
End: 2018-10-26

## 2018-10-26 NOTE — TELEPHONE ENCOUNTER
Pt stage 3 breast cancer had 3rd round of chemo treatment last week. Pt went to dermatologist Wednesday and her BP has been low 80/54. She has been feeling lightheaded and felt like she could pass out one time yesterday. Pt is on olmesartan/HCTZ  20/12. 5. Please advise.

## 2018-10-29 ENCOUNTER — TELEPHONE (OUTPATIENT)
Dept: DERMATOLOGY | Age: 53
End: 2018-10-29

## 2018-10-29 ENCOUNTER — OFFICE VISIT (OUTPATIENT)
Dept: DERMATOLOGY | Age: 53
End: 2018-10-29
Payer: COMMERCIAL

## 2018-10-29 VITALS
OXYGEN SATURATION: 94 % | WEIGHT: 161.6 LBS | HEIGHT: 63 IN | SYSTOLIC BLOOD PRESSURE: 117 MMHG | HEART RATE: 102 BPM | DIASTOLIC BLOOD PRESSURE: 81 MMHG | BODY MASS INDEX: 28.63 KG/M2

## 2018-10-29 DIAGNOSIS — R21 RASH: Primary | ICD-10-CM

## 2018-10-29 PROCEDURE — G8427 DOCREV CUR MEDS BY ELIG CLIN: HCPCS | Performed by: DERMATOLOGY

## 2018-10-29 PROCEDURE — 4004F PT TOBACCO SCREEN RCVD TLK: CPT | Performed by: DERMATOLOGY

## 2018-10-29 PROCEDURE — 99212 OFFICE O/P EST SF 10 MIN: CPT | Performed by: DERMATOLOGY

## 2018-10-29 PROCEDURE — 3017F COLORECTAL CA SCREEN DOC REV: CPT | Performed by: DERMATOLOGY

## 2018-10-29 PROCEDURE — G8417 CALC BMI ABV UP PARAM F/U: HCPCS | Performed by: DERMATOLOGY

## 2018-10-29 PROCEDURE — G8484 FLU IMMUNIZE NO ADMIN: HCPCS | Performed by: DERMATOLOGY

## 2018-10-29 RX ORDER — LIDOCAINE 40 MG/G
CREAM TOPICAL
Qty: 45 G | Refills: 1 | Status: SHIPPED | OUTPATIENT
Start: 2018-10-29

## 2018-10-29 NOTE — PROGRESS NOTES
Dermatology Patient Note  [facility] DERMATOLOGY  [address] [facility] [facility]  [address] [place] [address]  Dept: [phone]  Dept Fax: [phone]      VISIT DATE: 10/29/2018   REFERRING PROVIDER: No ref. provider found      Madelin Mendoza is a 46 y.o. female  who presents today in the office for:    Follow-up (rash on hands is so painful and hurting)      HISTORY OF PRESENT ILLNESS:  Madelin Mendoza is a 46 y.o. female who follows up for chemo rash (see last note). She presents today as the rest of her rash is improving, but her hands are still very painful. CURRENT MEDICATIONS:   Current Outpatient Prescriptions   Medication Sig Dispense Refill    lidocaine (LMX) 4 % cream Apply thin layer to hands 30 minutes prior to showering 45 g 1    tacrolimus (PROTOPIC) 0.1 % ointment Apply to rash on face twice daily 30 g 2    triamcinolone (KENALOG) 0.1 % cream Apply to rash on body twice daily (not face, armpit or groin) 80 g 1    clobetasol (TEMOVATE) 0.05 % ointment Apply to rash on hands twice daily (not face, armpit or groin) 30 g 2    phenylephrine-mineral oil-petrolatum (HEMORRHOIDAL) 0.25-14-74.9 % rectal ointment Place rectally 2 times daily as needed for Hemorrhoids 57 g 1    montelukast (SINGULAIR) 10 MG tablet take 1 tablet by mouth every evening 30 tablet 3    PROAIR  (90 Base) MCG/ACT inhaler inhale 2 puffs by mouth four times a day if needed 8.5 g 3    olmesartan-hydrochlorothiazide (BENICAR HCT) 20-12.5 MG per tablet Take 1 tablet by mouth daily 30 tablet 3    ALPRAZolam (XANAX) 0.25 MG tablet Take 0.25 mg by mouth nightly as needed for Sleep. Alleen Alcalde dexamethasone (DECADRON) 4 MG tablet Take 1 tab by mouth (with food) twice daily the day prior and the day after chemo 20 tablet 1    lidocaine-prilocaine (EMLA) 2.5-2.5 % cream Apply topically Daily as needed.  1 Tube 1    omeprazole (PRILOSEC) 20 MG delayed release capsule Take 1 capsule by mouth Daily 30 capsule 3   

## 2018-11-01 ENCOUNTER — HOSPITAL ENCOUNTER (OUTPATIENT)
Dept: WOMENS IMAGING | Age: 53
Discharge: HOME | End: 2018-11-03
Payer: COMMERCIAL

## 2018-11-01 DIAGNOSIS — Z17.0 MALIGNANT NEOPLASM OF UPPER-OUTER QUADRANT OF RIGHT BREAST IN FEMALE, ESTROGEN RECEPTOR POSITIVE (HCC): ICD-10-CM

## 2018-11-01 DIAGNOSIS — C50.411 MALIGNANT NEOPLASM OF UPPER-OUTER QUADRANT OF RIGHT BREAST IN FEMALE, ESTROGEN RECEPTOR POSITIVE (HCC): ICD-10-CM

## 2018-11-01 PROCEDURE — 76642 ULTRASOUND BREAST LIMITED: CPT

## 2018-11-01 PROCEDURE — 77065 DX MAMMO INCL CAD UNI: CPT

## 2018-11-06 ENCOUNTER — HOSPITAL ENCOUNTER (OUTPATIENT)
Age: 53
Setting detail: SPECIMEN
Discharge: HOME | End: 2018-11-06
Payer: COMMERCIAL

## 2018-11-06 ENCOUNTER — OFFICE VISIT (OUTPATIENT)
Dept: FAMILY MEDICINE CLINIC | Age: 53
End: 2018-11-06
Payer: COMMERCIAL

## 2018-11-06 VITALS
OXYGEN SATURATION: 98 % | DIASTOLIC BLOOD PRESSURE: 82 MMHG | BODY MASS INDEX: 29.05 KG/M2 | SYSTOLIC BLOOD PRESSURE: 130 MMHG | WEIGHT: 164 LBS | TEMPERATURE: 98.5 F | HEART RATE: 100 BPM

## 2018-11-06 DIAGNOSIS — C50.411 MALIGNANT NEOPLASM OF UPPER-OUTER QUADRANT OF RIGHT BREAST IN FEMALE, ESTROGEN RECEPTOR POSITIVE (HCC): ICD-10-CM

## 2018-11-06 DIAGNOSIS — R60.0 LOCALIZED EDEMA: Primary | ICD-10-CM

## 2018-11-06 DIAGNOSIS — L27.1 CHEMOTHERAPY-INDUCED ACRAL ERYTHEMA: ICD-10-CM

## 2018-11-06 DIAGNOSIS — Z17.0 MALIGNANT NEOPLASM OF UPPER-OUTER QUADRANT OF RIGHT BREAST IN FEMALE, ESTROGEN RECEPTOR POSITIVE (HCC): ICD-10-CM

## 2018-11-06 DIAGNOSIS — R60.0 LOCALIZED EDEMA: ICD-10-CM

## 2018-11-06 LAB
ALBUMIN SERPL-MCNC: 3.7 G/DL (ref 3.5–5.2)
ANION GAP SERPL CALCULATED.3IONS-SCNC: 13 MMOL/L (ref 9–17)
BUN BLDV-MCNC: 6 MG/DL (ref 6–20)
BUN/CREAT BLD: ABNORMAL (ref 9–20)
CALCIUM SERPL-MCNC: 9.1 MG/DL (ref 8.6–10.4)
CHLORIDE BLD-SCNC: 107 MMOL/L (ref 98–107)
CO2: 25 MMOL/L (ref 20–31)
CREAT SERPL-MCNC: 0.53 MG/DL (ref 0.5–0.9)
GFR AFRICAN AMERICAN: >60 ML/MIN
GFR NON-AFRICAN AMERICAN: >60 ML/MIN
GFR SERPL CREATININE-BSD FRML MDRD: ABNORMAL ML/MIN/{1.73_M2}
GFR SERPL CREATININE-BSD FRML MDRD: ABNORMAL ML/MIN/{1.73_M2}
GLUCOSE BLD-MCNC: 88 MG/DL (ref 70–99)
PHOSPHORUS: 2.8 MG/DL (ref 2.6–4.5)
POTASSIUM SERPL-SCNC: 4.2 MMOL/L (ref 3.7–5.3)
SODIUM BLD-SCNC: 145 MMOL/L (ref 135–144)

## 2018-11-06 PROCEDURE — 4004F PT TOBACCO SCREEN RCVD TLK: CPT | Performed by: FAMILY MEDICINE

## 2018-11-06 PROCEDURE — G8484 FLU IMMUNIZE NO ADMIN: HCPCS | Performed by: FAMILY MEDICINE

## 2018-11-06 PROCEDURE — G8417 CALC BMI ABV UP PARAM F/U: HCPCS | Performed by: FAMILY MEDICINE

## 2018-11-06 PROCEDURE — 3017F COLORECTAL CA SCREEN DOC REV: CPT | Performed by: FAMILY MEDICINE

## 2018-11-06 PROCEDURE — G8427 DOCREV CUR MEDS BY ELIG CLIN: HCPCS | Performed by: FAMILY MEDICINE

## 2018-11-06 PROCEDURE — 99213 OFFICE O/P EST LOW 20 MIN: CPT | Performed by: FAMILY MEDICINE

## 2018-11-06 RX ORDER — FUROSEMIDE 20 MG/1
20 TABLET ORAL DAILY
Qty: 10 TABLET | Refills: 0 | Status: SHIPPED | OUTPATIENT
Start: 2018-11-06

## 2018-11-06 NOTE — PROGRESS NOTES
General FM note    Xochitl Sands is a 46 y.o. female who presents today for follow up on her  medical conditions as noted below. Xochitl Sands is c/o of   Chief Complaint   Patient presents with    Foot Swelling    Hypotension       Patient Active Problem List:     H/O severe sun exposure     Chronic tension-type headache, intractable     Essential hypertension     Microscopic hematuria     Lower abdominal pain     Malignant neoplasm of upper-outer quadrant of breast in female, estrogen receptor positive ([address]     Chemotherapy induced diarrhea     Past Medical History:   Diagnosis Date    Anxiety     Asthma     Depression     Headache     Hyperlipidemia     Hypertension     Malignant neoplasm of upper-outer quadrant of breast in female, estrogen receptor positive ([address] 8/7/2018      Past Surgical History:   Procedure Laterality Date    BLADDER SURGERY  2000    BREAST SURGERY      cyst removed left breast    PARTIAL HYSTERECTOMY      AR INSJ PRPH CTR VAD W/SUBQ PORT AGE 5 YR/> Left 8/13/2018    PORT INSERTION WITH US AND FLUORO performed by Huma Seals MD at [address] [place] [address] SALPINGO-OOPHORECTOMY Left     ectopic pregnancy treated by Dr. Shaniqua Ibrahim TUNNELED VENOUS PORT PLACEMENT Left 08/13/2018    chemo port left chest     Family History   Problem Relation Age of Onset    Arthritis Mother         rheumatoid    Cancer Father         ling cancer    Other Father         circulatory issues    Breast Cancer Paternal Grandmother     Cancer Sister         skin cancer    Colon Cancer Other      Current Outpatient Prescriptions   Medication Sig Dispense Refill    furosemide (LASIX) 20 MG tablet Take 1 tablet by mouth daily 10 tablet 0    Misc.  Devices MISC 1 each by Does not apply route once for 1 dose 1 each 0    oxyCODONE-acetaminophen (PERCOCET) 5-325 MG per tablet take 1 tablet by mouth twice a day TAKE LOWEST DOSE POSSIBLE TO MANAGE PAIN 60 tablet 0    lidocaine (LMX) 4 % portions of this note were completed with a voice recognition program. Efforts were made to edit the dictations but occasionally words are mis-transcribed.)

## 2018-11-06 NOTE — PATIENT INSTRUCTIONS
regular visits while you are using duloxetine. Your family or other caregivers should also be alert to changes in your mood or symptoms. It is not known whether duloxetine will harm an unborn baby. However, duloxetine may cause problems in a  if you take the medicine during the third trimester of pregnancy. Tell your doctor if you are pregnant or plan to become pregnant while using this medicine. If you are pregnant, your name may be listed on a pregnancy registry. This is to track the outcome of the pregnancy and to evaluate any effects of duloxetine on the baby. Duloxetine can pass into breast milk, but effects on the nursing baby are not known. Tell your doctor if you are breast-feeding. Duloxetine is not approved for use by anyone younger than 25years old. How should I take duloxetine? Follow all directions on your prescription label. Do not take this medicine in larger or smaller amounts or for longer than recommended. You may take duloxetine with or without food. Do not crush, chew, break, or open an extended-release capsule. Swallow it whole. It may take 1 to 4 weeks before your symptoms improve. Keep using the medication as directed. Do not stop using duloxetine without first talking to your doctor. You may have unpleasant side effects if you stop taking this medicine suddenly. Store at room temperature away from moisture and heat. What happens if I miss a dose? Take the missed dose as soon as you remember. Skip the missed dose if it is almost time for your next scheduled dose. Do not  take extra medicine to make up the missed dose. What happens if I overdose? Seek emergency medical attention or call the Poison Help line at 1-740.250.6105. What should I avoid while taking duloxetine? Avoid drinking alcohol. It may increase your risk of liver damage. Ask your doctor before taking a nonsteroidal anti-inflammatory drug (NSAID) for pain, arthritis, fever, or swelling.  This includes

## 2018-11-07 NOTE — PROGRESS NOTES
I called the patient with results. She will use 10 mg of Lasix for her edema. She will follow up with oncology. X-ray chest did not show any fluid in her lungs or heart enlargement. .  Thank you.

## 2018-11-08 ENCOUNTER — HOSPITAL ENCOUNTER (OUTPATIENT)
Dept: INFUSION THERAPY | Age: 53
Discharge: HOME | End: 2018-11-08
Payer: COMMERCIAL

## 2018-11-08 ENCOUNTER — OFFICE VISIT (OUTPATIENT)
Dept: ONCOLOGY | Age: 53
End: 2018-11-08
Payer: COMMERCIAL

## 2018-11-08 ENCOUNTER — INITIAL CONSULT (OUTPATIENT)
Dept: ONCOLOGY | Age: 53
End: 2018-11-08
Payer: COMMERCIAL

## 2018-11-08 ENCOUNTER — TELEPHONE (OUTPATIENT)
Dept: ONCOLOGY | Age: 53
End: 2018-11-08

## 2018-11-08 VITALS
BODY MASS INDEX: 28.96 KG/M2 | TEMPERATURE: 98 F | WEIGHT: 163.5 LBS | DIASTOLIC BLOOD PRESSURE: 88 MMHG | SYSTOLIC BLOOD PRESSURE: 148 MMHG | HEART RATE: 112 BPM

## 2018-11-08 DIAGNOSIS — C50.411 MALIGNANT NEOPLASM OF UPPER-OUTER QUADRANT OF RIGHT BREAST IN FEMALE, ESTROGEN RECEPTOR POSITIVE (HCC): Primary | ICD-10-CM

## 2018-11-08 DIAGNOSIS — Z17.0 MALIGNANT NEOPLASM OF UPPER-OUTER QUADRANT OF RIGHT BREAST IN FEMALE, ESTROGEN RECEPTOR POSITIVE (HCC): Primary | ICD-10-CM

## 2018-11-08 DIAGNOSIS — Z17.0 MALIGNANT NEOPLASM OF UPPER-OUTER QUADRANT OF RIGHT BREAST IN FEMALE, ESTROGEN RECEPTOR POSITIVE (HCC): ICD-10-CM

## 2018-11-08 DIAGNOSIS — C50.411 MALIGNANT NEOPLASM OF UPPER-OUTER QUADRANT OF RIGHT BREAST IN FEMALE, ESTROGEN RECEPTOR POSITIVE (HCC): ICD-10-CM

## 2018-11-08 DIAGNOSIS — L27.1 CHEMOTHERAPY-INDUCED ACRAL ERYTHEMA: ICD-10-CM

## 2018-11-08 DIAGNOSIS — Z80.3 FAMILY HISTORY OF MALIGNANT NEOPLASM OF BREAST: ICD-10-CM

## 2018-11-08 DIAGNOSIS — K52.1 CHEMOTHERAPY INDUCED DIARRHEA: ICD-10-CM

## 2018-11-08 DIAGNOSIS — T45.1X5A CHEMOTHERAPY INDUCED DIARRHEA: ICD-10-CM

## 2018-11-08 LAB
ABSOLUTE EOS #: 0 K/UL (ref 0–0.4)
ABSOLUTE IMMATURE GRANULOCYTE: ABNORMAL K/UL (ref 0–0.3)
ABSOLUTE LYMPH #: 0.6 K/UL (ref 1–4.8)
ABSOLUTE MONO #: 0.7 K/UL (ref 0.1–1.2)
ALBUMIN SERPL-MCNC: 4.1 G/DL (ref 3.5–5.2)
ALBUMIN/GLOBULIN RATIO: 1.6 (ref 1–2.5)
ALP BLD-CCNC: 74 U/L (ref 35–104)
ALT SERPL-CCNC: 11 U/L (ref 5–33)
ANION GAP SERPL CALCULATED.3IONS-SCNC: 12 MMOL/L (ref 9–17)
AST SERPL-CCNC: 14 U/L
BASOPHILS # BLD: 0 % (ref 0–2)
BASOPHILS ABSOLUTE: 0 K/UL (ref 0–0.2)
BILIRUB SERPL-MCNC: 0.32 MG/DL (ref 0.3–1.2)
BUN BLDV-MCNC: 10 MG/DL (ref 6–20)
BUN/CREAT BLD: ABNORMAL (ref 9–20)
CALCIUM SERPL-MCNC: 9.6 MG/DL (ref 8.6–10.4)
CHLORIDE BLD-SCNC: 107 MMOL/L (ref 98–107)
CO2: 23 MMOL/L (ref 20–31)
CREAT SERPL-MCNC: 0.43 MG/DL (ref 0.5–0.9)
DIFFERENTIAL TYPE: ABNORMAL
EOSINOPHILS RELATIVE PERCENT: 0 % (ref 1–4)
GFR AFRICAN AMERICAN: >60 ML/MIN
GFR NON-AFRICAN AMERICAN: >60 ML/MIN
GFR SERPL CREATININE-BSD FRML MDRD: ABNORMAL ML/MIN/{1.73_M2}
GFR SERPL CREATININE-BSD FRML MDRD: ABNORMAL ML/MIN/{1.73_M2}
GLUCOSE BLD-MCNC: 142 MG/DL (ref 70–99)
HCT VFR BLD CALC: 35.6 % (ref 36–46)
HEMOGLOBIN: 12 G/DL (ref 12–16)
IMMATURE GRANULOCYTES: ABNORMAL %
LYMPHOCYTES # BLD: 6 % (ref 24–44)
MCH RBC QN AUTO: 34.2 PG (ref 26–34)
MCHC RBC AUTO-ENTMCNC: 33.7 G/DL (ref 31–37)
MCV RBC AUTO: 101.5 FL (ref 80–100)
MONOCYTES # BLD: 7 % (ref 2–11)
NRBC AUTOMATED: ABNORMAL PER 100 WBC
PDW BLD-RTO: 19.1 % (ref 12.5–15.4)
PLATELET # BLD: 369 K/UL (ref 140–450)
PLATELET ESTIMATE: ABNORMAL
PMV BLD AUTO: 8.2 FL (ref 6–12)
POTASSIUM SERPL-SCNC: 3.9 MMOL/L (ref 3.7–5.3)
RBC # BLD: 3.51 M/UL (ref 4–5.2)
RBC # BLD: ABNORMAL 10*6/UL
SEG NEUTROPHILS: 87 % (ref 36–66)
SEGMENTED NEUTROPHILS ABSOLUTE COUNT: 9.2 K/UL (ref 1.8–7.7)
SODIUM BLD-SCNC: 142 MMOL/L (ref 135–144)
TOTAL PROTEIN: 6.7 G/DL (ref 6.4–8.3)
WBC # BLD: 10.5 K/UL (ref 3.5–11)
WBC # BLD: ABNORMAL 10*3/UL

## 2018-11-08 PROCEDURE — 96367 TX/PROPH/DG ADDL SEQ IV INF: CPT

## 2018-11-08 PROCEDURE — 80053 COMPREHEN METABOLIC PANEL: CPT

## 2018-11-08 PROCEDURE — 96413 CHEMO IV INFUSION 1 HR: CPT

## 2018-11-08 PROCEDURE — 4004F PT TOBACCO SCREEN RCVD TLK: CPT | Performed by: INTERNAL MEDICINE

## 2018-11-08 PROCEDURE — G8484 FLU IMMUNIZE NO ADMIN: HCPCS | Performed by: INTERNAL MEDICINE

## 2018-11-08 PROCEDURE — 96417 CHEMO IV INFUS EACH ADDL SEQ: CPT

## 2018-11-08 PROCEDURE — 36591 DRAW BLOOD OFF VENOUS DEVICE: CPT

## 2018-11-08 PROCEDURE — 99214 OFFICE O/P EST MOD 30 MIN: CPT | Performed by: INTERNAL MEDICINE

## 2018-11-08 PROCEDURE — 85025 COMPLETE CBC W/AUTO DIFF WBC: CPT

## 2018-11-08 PROCEDURE — 96375 TX/PRO/DX INJ NEW DRUG ADDON: CPT

## 2018-11-08 PROCEDURE — 96040 PR GENETIC COUNSELING, EACH 30 MIN: CPT | Performed by: GENETIC COUNSELOR, MS

## 2018-11-08 PROCEDURE — 6360000002 HC RX W HCPCS: Performed by: INTERNAL MEDICINE

## 2018-11-08 PROCEDURE — G8417 CALC BMI ABV UP PARAM F/U: HCPCS | Performed by: INTERNAL MEDICINE

## 2018-11-08 PROCEDURE — G8427 DOCREV CUR MEDS BY ELIG CLIN: HCPCS | Performed by: INTERNAL MEDICINE

## 2018-11-08 PROCEDURE — 2580000003 HC RX 258: Performed by: INTERNAL MEDICINE

## 2018-11-08 PROCEDURE — 3017F COLORECTAL CA SCREEN DOC REV: CPT | Performed by: INTERNAL MEDICINE

## 2018-11-08 PROCEDURE — 96377 APPLICATON ON-BODY INJECTOR: CPT

## 2018-11-08 RX ORDER — DEXAMETHASONE SODIUM PHOSPHATE 10 MG/ML
10 INJECTION INTRAMUSCULAR; INTRAVENOUS ONCE
Status: COMPLETED | OUTPATIENT
Start: 2018-11-08 | End: 2018-11-08

## 2018-11-08 RX ORDER — SODIUM CHLORIDE 9 MG/ML
INJECTION, SOLUTION INTRAVENOUS ONCE
Status: COMPLETED | OUTPATIENT
Start: 2018-11-08 | End: 2018-11-08

## 2018-11-08 RX ORDER — SODIUM CHLORIDE 0.9 % (FLUSH) 0.9 %
10 SYRINGE (ML) INJECTION PRN
Status: DISCONTINUED | OUTPATIENT
Start: 2018-11-08 | End: 2018-11-09 | Stop reason: HOSPADM

## 2018-11-08 RX ORDER — HEPARIN SODIUM (PORCINE) LOCK FLUSH IV SOLN 100 UNIT/ML 100 UNIT/ML
500 SOLUTION INTRAVENOUS PRN
Status: DISCONTINUED | OUTPATIENT
Start: 2018-11-08 | End: 2018-11-09 | Stop reason: HOSPADM

## 2018-11-08 RX ORDER — PALONOSETRON 0.05 MG/ML
0.25 INJECTION, SOLUTION INTRAVENOUS ONCE
Status: COMPLETED | OUTPATIENT
Start: 2018-11-08 | End: 2018-11-08

## 2018-11-08 RX ORDER — DULOXETIN HYDROCHLORIDE 30 MG/1
30 CAPSULE, DELAYED RELEASE ORAL DAILY
Qty: 30 CAPSULE | Refills: 3 | Status: SHIPPED | OUTPATIENT
Start: 2018-11-08 | End: 2018-12-20 | Stop reason: SDUPTHER

## 2018-11-08 RX ADMIN — TRASTUZUMAB 449 MG: 150 INJECTION, POWDER, LYOPHILIZED, FOR SOLUTION INTRAVENOUS at 12:11

## 2018-11-08 RX ADMIN — Medication 10 ML: at 09:55

## 2018-11-08 RX ADMIN — Medication 10 ML: at 09:56

## 2018-11-08 RX ADMIN — PEGFILGRASTIM 6 MG: KIT SUBCUTANEOUS at 13:13

## 2018-11-08 RX ADMIN — DEXAMETHASONE SODIUM PHOSPHATE 10 MG: 10 INJECTION INTRAMUSCULAR; INTRAVENOUS at 10:18

## 2018-11-08 RX ADMIN — Medication 10 ML: at 13:51

## 2018-11-08 RX ADMIN — SODIUM CHLORIDE, PRESERVATIVE FREE 500 UNITS: 5 INJECTION INTRAVENOUS at 13:51

## 2018-11-08 RX ADMIN — PALONOSETRON 0.25 MG: 0.05 INJECTION, SOLUTION INTRAVENOUS at 10:18

## 2018-11-08 RX ADMIN — PERTUZUMAB 420 MG: 30 INJECTION, SOLUTION, CONCENTRATE INTRAVENOUS at 11:01

## 2018-11-08 RX ADMIN — DOCETAXEL ANHYDROUS 136 MG: 10 INJECTION, SOLUTION INTRAVENOUS at 12:45

## 2018-11-08 RX ADMIN — SODIUM CHLORIDE 150 MG: 900 INJECTION, SOLUTION INTRAVENOUS at 10:26

## 2018-11-08 RX ADMIN — SODIUM CHLORIDE: 9 INJECTION, SOLUTION INTRAVENOUS at 10:16

## 2018-11-08 NOTE — PROGRESS NOTES
Pt here for C4D1 Perjeta, Herceptin, Taxotere. Pt seen by Dr Tucker Liao prior to treatment, refer to his note. . Labs drawn from port and results reviewed. Pt was treated without incident and d/c'd in stable condition with Neulasta OBI. Pt will return in 3 weeks for C5D1.

## 2018-11-08 NOTE — LETTER
WBC 10.5 11/08/2018    HGB 12.0 11/08/2018    HCT 35.6 (L) 11/08/2018    .5 (H) 11/08/2018     11/08/2018        Chemistry        Component Value Date/Time     (H) 11/06/2018 0926    K 4.2 11/06/2018 0926     11/06/2018 0926    CO2 25 11/06/2018 0926    BUN 6 11/06/2018 0926    CREATININE 0.53 11/06/2018 0926        Component Value Date/Time    CALCIUM 9.1 11/06/2018 0926    ALKPHOS 69 10/18/2018 1005    AST 16 10/18/2018 1005    ALT 16 10/18/2018 1005    BILITOT 0.38 10/18/2018 1005            REVIEW OF RADIOLOGICAL RESULTS:   11/01/2018 JESSY/US RIGHT IMPRESSION:  Biopsy-proven malignant focal asymmetry associated with the suspicious   calcifications in the upper outer quadrant of the right breast measures 6.2 x   4.5 x 3.7 on mammogram, not significantly changed in size on mammogram,   however, its overall density has decreased.  It extends from posterior depth   to the nipple.  Its size has been underestimated on ultrasound when compared   to MRI.  It measures 2.3 x 1.5 x 1.1 cm on current ultrasound, decreased from   2.9 x 2.2 x 1.3 cm on ultrasound of 07/13/2018.       Largest biopsy-proven metastatic lymph node in the right axilla measures 3 x   0.9 cm with cortical thickness of 4 mm, significantly decreased since prior   ultrasound when it measured 4.1 x 3.9 x 2.5 cm.  Additional lymph node with   complete loss of fatty hilum in the right axilla measures 0.9 x 0.9 x 0.7 cm,   decreased from 1.4 x 1.1 x 1.1 cm.       RECOMMENDATION:       Follow-up with Medical Oncology, Surgery and Radiation Oncology.       BIRADS:   BIRADS - CATEGORY 6         IMPRESSION:   1. Clinical stage [id number] triple positive breast cancer  2. Cervical adenopathy, for evaluation and biopsy showed no evidence of metastatic disease  3. Undergoing induction chemotherapy  4. Chemotherapy induced diarrhea complicated by skin irritation  5.  Hand-foot syndrome, toxicity    PLAN:

## 2018-11-09 NOTE — PROGRESS NOTES
[facility]ing [facility]   Hereditary Cancer Risk Assessment     Name: Petra Nevarez   YOB: 1965   Date of Consultation: 11/8/18    Ms. Carlyle Zarate was seen at the [facility] for genetic counseling on 11/8/18. Ms. Carlyle Zarate was referred by Dr. Zahra Schwartz due to her personal and family history of breast cancer. Ms. Carmita Zamudio daughter, Jean Padilla was also present at the appointment. PERSONAL AND FAMILY HISTORY   Ms. Carlyle Zarate is a 46 y.o.  female with a personal history of triple positive breast cancer diagnosed at age 46. She is currently undergoing neoadjuvant chemotherapy. She reports menarche at age 5, first live birth at age 31 and underwent menopause at age 31. She has had a hysterectomy in the past and both ovaries have been removed as well. Ms. Carmita Zamudio family history is significant for her sister with melanoma at age 48. Her father was diagnosed with melanoma at age 64 and lung cancer at age 58. Her paternal uncle was diagnosed with prostate cancer at age 48 and melanoma at age 68. Her paternal grandmother was diagnosed with breast cancer at age 50. There are no other cancers in Ms. Rascon's paternal or maternal families. However, she does report that her nephew was diagnosed with colon cancer at age 32 and underwent genetic testing approximately 5 years ago which was negative by report. Ms. Carlyle Zarate reports [place], [place], and [place] ancestry and denies any known Ashkenazi Mandaeism heritage. RISK ASSESSMENT   We discussed that approximately 5-10% of cancers are due to a hereditary gene mutation which causes an increased risk for certain cancers. Hereditary cancers are typically diagnosed at younger ages (under age 46y) and occur in multiple generations of a family. Multiple individuals with the same type of cancer (example: breast) or uncommon cancers (example: ovarian, pancreatic, male breast cancer) are also features of hereditary cancers. We discussed that Ms. Rascon's personal history is not highly concerning for a hereditary predisposition given that her breast cancer was diagnosed over age 48. However, she does report a close paternal relative with breast cancer under age 48. She also has several relatives with melanoma and one with an early onset prostate cancer. Most concerning is a nephew with colon cancer in his 25s; however, he already underwent genetic testing which was negative by report. In summary, Ms. Pollockrobert 69 (NCCN) guidelines for genetic testing based on her personal history of breast cancer at age 46 and having a paternal second degree relative with breast cancer under age 48. DISCUSSION  We discussed that the BRCA1/2 genes are the most common genes associated with hereditary breast and ovarian cancer. We also discussed that genetic testing is available for multiple other genes related to hereditary cancer. Some of these genes are known to carry a significant increased risk for several cancers including colon, breast, uterine, ovarian, stomach, and pancreatic cancer, while some of these genes are believed to have a moderate increased risk for breast and other cancers. We discussed the possibility of finding a mutation in genes with limited information to guide medical management, as well we as the possibility of identifying variants of uncertain significance (VUS). We discussed the risks, benefits, and limitations of genetic testing. Possible test results were discussed as well as potential screening and prevention strategies. Specifically, we discussed increased breast cancer surveillance by mammogram and breast MRI as well as the option of prophylactic mastectomy. We discussed the recommendation for prophylactic oophorectomy for results which suggest an increased risk for ovarian cancer. Lastly, we discussed that the results of Ms. Rascon's genetic testing may be beneficial in

## 2018-11-14 ENCOUNTER — TELEPHONE (OUTPATIENT)
Dept: DERMATOLOGY | Age: 53
End: 2018-11-14

## 2018-11-14 ENCOUNTER — OFFICE VISIT (OUTPATIENT)
Dept: DERMATOLOGY | Age: 53
End: 2018-11-14
Payer: COMMERCIAL

## 2018-11-14 VITALS
HEART RATE: 97 BPM | OXYGEN SATURATION: 94 % | BODY MASS INDEX: 27.71 KG/M2 | SYSTOLIC BLOOD PRESSURE: 104 MMHG | WEIGHT: 156.4 LBS | HEIGHT: 63 IN | DIASTOLIC BLOOD PRESSURE: 66 MMHG

## 2018-11-14 DIAGNOSIS — R21 RASH: Primary | ICD-10-CM

## 2018-11-14 PROCEDURE — 3017F COLORECTAL CA SCREEN DOC REV: CPT | Performed by: DERMATOLOGY

## 2018-11-14 PROCEDURE — G8427 DOCREV CUR MEDS BY ELIG CLIN: HCPCS | Performed by: DERMATOLOGY

## 2018-11-14 PROCEDURE — G8417 CALC BMI ABV UP PARAM F/U: HCPCS | Performed by: DERMATOLOGY

## 2018-11-14 PROCEDURE — G8484 FLU IMMUNIZE NO ADMIN: HCPCS | Performed by: DERMATOLOGY

## 2018-11-14 PROCEDURE — 4004F PT TOBACCO SCREEN RCVD TLK: CPT | Performed by: DERMATOLOGY

## 2018-11-14 PROCEDURE — 99213 OFFICE O/P EST LOW 20 MIN: CPT | Performed by: DERMATOLOGY

## 2018-11-14 RX ORDER — LIDOCAINE HYDROCHLORIDE AND EPINEPHRINE 10; 10 MG/ML; UG/ML
1 INJECTION, SOLUTION INFILTRATION; PERINEURAL ONCE
Status: CANCELLED | OUTPATIENT
Start: 2018-11-14 | End: 2018-11-14

## 2018-11-15 NOTE — PROGRESS NOTES
Dermatology Patient Note  [facility] DERMATOLOGY  [address] [facility] [address] [facility]  [address] [place] [address]  Dept: [phone]  Dept Fax: [phone]      VISIT DATE: 11/14/2018   REFERRING PROVIDER: No ref. provider found      Kendall Duron is a 46 y.o. female  who presents today in the office for:    Follow-up (It's here Chemo week. The hands are still the worse, feet and vaginal area are better.  Dr Chad Price called in a compound medication.) and Rash      HISTORY OF PRESENT ILLNESS:  HPI Rash Followup:    Sonya Ly was seen today for follow-up evaluation of chemo rash    Interim Course: improved except for hands    Areas of Involvement: hands    Associated Symptoms: Pain    Exacerbating Factors: chemo    Current Medications for this Rash:  See Med List Below     Rash Treatment Compliance:  Using all Topical Medications as Prescribed at Last Visit    Side Effects from Treatments: None    Interim  Evaluation: None                CURRENT MEDICATIONS:   Current Outpatient Prescriptions   Medication Sig Dispense Refill    DULoxetine (CYMBALTA) 30 MG extended release capsule Take 1 capsule by mouth daily 30 capsule 3    furosemide (LASIX) 20 MG tablet Take 1 tablet by mouth daily (Patient taking differently: Take 20 mg by mouth daily as needed ) 10 tablet 0    oxyCODONE-acetaminophen (PERCOCET) 5-325 MG per tablet take 1 tablet by mouth twice a day TAKE LOWEST DOSE POSSIBLE TO MANAGE PAIN 60 tablet 0    lidocaine (LMX) 4 % cream Apply thin layer to hands 30 minutes prior to showering 45 g 1    tacrolimus (PROTOPIC) 0.1 % ointment Apply to rash on face twice daily 30 g 2    triamcinolone (KENALOG) 0.1 % cream Apply to rash on body twice daily (not face, armpit or groin) 80 g 1    clobetasol (TEMOVATE) 0.05 % ointment Apply to rash on hands twice daily (not face, armpit or groin) 30 g 2    phenylephrine-mineral oil-petrolatum (HEMORRHOIDAL) 0.25-14-74.9 % rectal ointment Place rectally 2 times daily as

## 2018-11-19 ENCOUNTER — HOSPITAL ENCOUNTER (OUTPATIENT)
Dept: NON INVASIVE DIAGNOSTICS | Age: 53
Discharge: HOME | End: 2018-11-19
Payer: COMMERCIAL

## 2018-11-19 DIAGNOSIS — R60.0 LOCALIZED EDEMA: ICD-10-CM

## 2018-11-19 LAB
LV EF: 66 %
LVEF MODALITY: NORMAL

## 2018-11-19 PROCEDURE — 93306 TTE W/DOPPLER COMPLETE: CPT

## 2018-11-28 RX ORDER — OMEPRAZOLE 20 MG/1
CAPSULE, DELAYED RELEASE ORAL
Qty: 30 CAPSULE | Refills: 3 | Status: SHIPPED | OUTPATIENT
Start: 2018-11-28 | End: 2019-01-28 | Stop reason: ALTCHOICE

## 2018-11-28 RX ORDER — TRIAMCINOLONE ACETONIDE 1 MG/G
CREAM TOPICAL
Qty: 80 G | Refills: 1 | Status: SHIPPED | OUTPATIENT
Start: 2018-11-28 | End: 2020-12-10 | Stop reason: SDUPTHER

## 2018-11-29 ENCOUNTER — HOSPITAL ENCOUNTER (OUTPATIENT)
Dept: INFUSION THERAPY | Age: 53
Discharge: HOME | End: 2018-11-29
Payer: COMMERCIAL

## 2018-11-29 ENCOUNTER — TELEPHONE (OUTPATIENT)
Dept: ONCOLOGY | Age: 53
End: 2018-11-29

## 2018-11-29 ENCOUNTER — OFFICE VISIT (OUTPATIENT)
Dept: ONCOLOGY | Age: 53
End: 2018-11-29
Payer: COMMERCIAL

## 2018-11-29 VITALS
BODY MASS INDEX: 28.35 KG/M2 | DIASTOLIC BLOOD PRESSURE: 95 MMHG | TEMPERATURE: 97.9 F | HEART RATE: 118 BPM | SYSTOLIC BLOOD PRESSURE: 148 MMHG | WEIGHT: 160.06 LBS

## 2018-11-29 DIAGNOSIS — T45.1X5A CHEMOTHERAPY INDUCED DIARRHEA: ICD-10-CM

## 2018-11-29 DIAGNOSIS — K52.1 CHEMOTHERAPY INDUCED DIARRHEA: ICD-10-CM

## 2018-11-29 DIAGNOSIS — Z17.0 MALIGNANT NEOPLASM OF UPPER-OUTER QUADRANT OF RIGHT BREAST IN FEMALE, ESTROGEN RECEPTOR POSITIVE (HCC): Primary | ICD-10-CM

## 2018-11-29 DIAGNOSIS — C50.411 MALIGNANT NEOPLASM OF UPPER-OUTER QUADRANT OF RIGHT BREAST IN FEMALE, ESTROGEN RECEPTOR POSITIVE (HCC): Primary | ICD-10-CM

## 2018-11-29 DIAGNOSIS — C50.411 MALIGNANT NEOPLASM OF UPPER-OUTER QUADRANT OF RIGHT BREAST IN FEMALE, ESTROGEN RECEPTOR POSITIVE (HCC): ICD-10-CM

## 2018-11-29 DIAGNOSIS — Z17.0 MALIGNANT NEOPLASM OF UPPER-OUTER QUADRANT OF RIGHT BREAST IN FEMALE, ESTROGEN RECEPTOR POSITIVE (HCC): ICD-10-CM

## 2018-11-29 DIAGNOSIS — L27.1 CHEMOTHERAPY-INDUCED ACRAL ERYTHEMA: ICD-10-CM

## 2018-11-29 LAB
ABSOLUTE EOS #: 0 K/UL (ref 0–0.4)
ABSOLUTE IMMATURE GRANULOCYTE: ABNORMAL K/UL (ref 0–0.3)
ABSOLUTE LYMPH #: 0.6 K/UL (ref 1–4.8)
ABSOLUTE MONO #: 0.7 K/UL (ref 0.1–1.2)
ALBUMIN SERPL-MCNC: 4.2 G/DL (ref 3.5–5.2)
ALBUMIN/GLOBULIN RATIO: 1.8 (ref 1–2.5)
ALP BLD-CCNC: 75 U/L (ref 35–104)
ALT SERPL-CCNC: 10 U/L (ref 5–33)
ANION GAP SERPL CALCULATED.3IONS-SCNC: 14 MMOL/L (ref 9–17)
AST SERPL-CCNC: 13 U/L
BASOPHILS # BLD: 0 % (ref 0–2)
BASOPHILS ABSOLUTE: 0 K/UL (ref 0–0.2)
BILIRUB SERPL-MCNC: 0.29 MG/DL (ref 0.3–1.2)
BUN BLDV-MCNC: 9 MG/DL (ref 6–20)
BUN/CREAT BLD: ABNORMAL (ref 9–20)
CALCIUM SERPL-MCNC: 9.7 MG/DL (ref 8.6–10.4)
CHLORIDE BLD-SCNC: 105 MMOL/L (ref 98–107)
CO2: 23 MMOL/L (ref 20–31)
CREAT SERPL-MCNC: <0.4 MG/DL (ref 0.5–0.9)
DIFFERENTIAL TYPE: ABNORMAL
EOSINOPHILS RELATIVE PERCENT: 0 % (ref 1–4)
GFR AFRICAN AMERICAN: ABNORMAL ML/MIN
GFR NON-AFRICAN AMERICAN: ABNORMAL ML/MIN
GFR SERPL CREATININE-BSD FRML MDRD: ABNORMAL ML/MIN/{1.73_M2}
GFR SERPL CREATININE-BSD FRML MDRD: ABNORMAL ML/MIN/{1.73_M2}
GLUCOSE BLD-MCNC: 178 MG/DL (ref 70–99)
HCT VFR BLD CALC: 37.9 % (ref 36–46)
HEMOGLOBIN: 12.6 G/DL (ref 12–16)
IMMATURE GRANULOCYTES: ABNORMAL %
LYMPHOCYTES # BLD: 5 % (ref 24–44)
MCH RBC QN AUTO: 34.2 PG (ref 26–34)
MCHC RBC AUTO-ENTMCNC: 33.1 G/DL (ref 31–37)
MCV RBC AUTO: 103.3 FL (ref 80–100)
MONOCYTES # BLD: 5 % (ref 2–11)
NRBC AUTOMATED: ABNORMAL PER 100 WBC
PDW BLD-RTO: 19.4 % (ref 12.5–15.4)
PLATELET # BLD: 372 K/UL (ref 140–450)
PLATELET ESTIMATE: ABNORMAL
PMV BLD AUTO: 8.2 FL (ref 6–12)
POTASSIUM SERPL-SCNC: 3.8 MMOL/L (ref 3.7–5.3)
RBC # BLD: 3.67 M/UL (ref 4–5.2)
RBC # BLD: ABNORMAL 10*6/UL
SEG NEUTROPHILS: 90 % (ref 36–66)
SEGMENTED NEUTROPHILS ABSOLUTE COUNT: 11 K/UL (ref 1.8–7.7)
SODIUM BLD-SCNC: 142 MMOL/L (ref 135–144)
TOTAL PROTEIN: 6.5 G/DL (ref 6.4–8.3)
WBC # BLD: 12.2 K/UL (ref 3.5–11)
WBC # BLD: ABNORMAL 10*3/UL

## 2018-11-29 PROCEDURE — G8417 CALC BMI ABV UP PARAM F/U: HCPCS | Performed by: INTERNAL MEDICINE

## 2018-11-29 PROCEDURE — 85025 COMPLETE CBC W/AUTO DIFF WBC: CPT

## 2018-11-29 PROCEDURE — G8484 FLU IMMUNIZE NO ADMIN: HCPCS | Performed by: INTERNAL MEDICINE

## 2018-11-29 PROCEDURE — 99215 OFFICE O/P EST HI 40 MIN: CPT | Performed by: INTERNAL MEDICINE

## 2018-11-29 PROCEDURE — 36591 DRAW BLOOD OFF VENOUS DEVICE: CPT

## 2018-11-29 PROCEDURE — G8427 DOCREV CUR MEDS BY ELIG CLIN: HCPCS | Performed by: INTERNAL MEDICINE

## 2018-11-29 PROCEDURE — 3017F COLORECTAL CA SCREEN DOC REV: CPT | Performed by: INTERNAL MEDICINE

## 2018-11-29 PROCEDURE — 96365 THER/PROPH/DIAG IV INF INIT: CPT

## 2018-11-29 PROCEDURE — 96367 TX/PROPH/DG ADDL SEQ IV INF: CPT

## 2018-11-29 PROCEDURE — 2580000003 HC RX 258: Performed by: INTERNAL MEDICINE

## 2018-11-29 PROCEDURE — 96417 CHEMO IV INFUS EACH ADDL SEQ: CPT

## 2018-11-29 PROCEDURE — 6360000002 HC RX W HCPCS: Performed by: INTERNAL MEDICINE

## 2018-11-29 PROCEDURE — 96375 TX/PRO/DX INJ NEW DRUG ADDON: CPT

## 2018-11-29 PROCEDURE — 96377 APPLICATON ON-BODY INJECTOR: CPT

## 2018-11-29 PROCEDURE — 96413 CHEMO IV INFUSION 1 HR: CPT

## 2018-11-29 PROCEDURE — 4004F PT TOBACCO SCREEN RCVD TLK: CPT | Performed by: INTERNAL MEDICINE

## 2018-11-29 PROCEDURE — 80053 COMPREHEN METABOLIC PANEL: CPT

## 2018-11-29 RX ORDER — PALONOSETRON 0.05 MG/ML
0.25 INJECTION, SOLUTION INTRAVENOUS ONCE
Status: COMPLETED | OUTPATIENT
Start: 2018-11-29 | End: 2018-11-29

## 2018-11-29 RX ORDER — HEPARIN SODIUM (PORCINE) LOCK FLUSH IV SOLN 100 UNIT/ML 100 UNIT/ML
500 SOLUTION INTRAVENOUS PRN
Status: CANCELLED | OUTPATIENT
Start: 2019-01-17

## 2018-11-29 RX ORDER — SODIUM CHLORIDE 9 MG/ML
INJECTION, SOLUTION INTRAVENOUS ONCE
Status: COMPLETED | OUTPATIENT
Start: 2018-11-29 | End: 2018-11-29

## 2018-11-29 RX ORDER — SODIUM CHLORIDE 0.9 % (FLUSH) 0.9 %
10 SYRINGE (ML) INJECTION PRN
Status: CANCELLED | OUTPATIENT
Start: 2019-01-17

## 2018-11-29 RX ORDER — MEPERIDINE HYDROCHLORIDE 50 MG/ML
12.5 INJECTION INTRAMUSCULAR; INTRAVENOUS; SUBCUTANEOUS ONCE
Status: CANCELLED | OUTPATIENT
Start: 2019-01-17 | End: 2019-01-17

## 2018-11-29 RX ORDER — HEPARIN SODIUM (PORCINE) LOCK FLUSH IV SOLN 100 UNIT/ML 100 UNIT/ML
500 SOLUTION INTRAVENOUS PRN
Status: CANCELLED | OUTPATIENT
Start: 2019-02-07

## 2018-11-29 RX ORDER — MEPERIDINE HYDROCHLORIDE 50 MG/ML
12.5 INJECTION INTRAMUSCULAR; INTRAVENOUS; SUBCUTANEOUS ONCE
Status: CANCELLED | OUTPATIENT
Start: 2019-02-07 | End: 2019-02-07

## 2018-11-29 RX ORDER — SODIUM CHLORIDE 0.9 % (FLUSH) 0.9 %
10 SYRINGE (ML) INJECTION PRN
Status: DISCONTINUED | OUTPATIENT
Start: 2018-11-29 | End: 2018-11-30 | Stop reason: HOSPADM

## 2018-11-29 RX ORDER — DIPHENHYDRAMINE HYDROCHLORIDE 50 MG/ML
50 INJECTION INTRAMUSCULAR; INTRAVENOUS ONCE
Status: CANCELLED | OUTPATIENT
Start: 2019-02-07 | End: 2019-02-07

## 2018-11-29 RX ORDER — SODIUM CHLORIDE 9 MG/ML
INJECTION, SOLUTION INTRAVENOUS CONTINUOUS
Status: CANCELLED | OUTPATIENT
Start: 2019-02-07

## 2018-11-29 RX ORDER — METHYLPREDNISOLONE SODIUM SUCCINATE 125 MG/2ML
125 INJECTION, POWDER, LYOPHILIZED, FOR SOLUTION INTRAMUSCULAR; INTRAVENOUS ONCE
Status: CANCELLED | OUTPATIENT
Start: 2019-01-17 | End: 2019-01-17

## 2018-11-29 RX ORDER — 0.9 % SODIUM CHLORIDE 0.9 %
10 VIAL (ML) INJECTION ONCE
Status: CANCELLED | OUTPATIENT
Start: 2019-02-07 | End: 2019-02-07

## 2018-11-29 RX ORDER — DEXAMETHASONE SODIUM PHOSPHATE 10 MG/ML
10 INJECTION INTRAMUSCULAR; INTRAVENOUS ONCE
Status: COMPLETED | OUTPATIENT
Start: 2018-11-29 | End: 2018-11-29

## 2018-11-29 RX ORDER — HEPARIN SODIUM (PORCINE) LOCK FLUSH IV SOLN 100 UNIT/ML 100 UNIT/ML
500 SOLUTION INTRAVENOUS PRN
Status: DISCONTINUED | OUTPATIENT
Start: 2018-11-29 | End: 2018-11-30 | Stop reason: HOSPADM

## 2018-11-29 RX ORDER — SODIUM CHLORIDE 0.9 % (FLUSH) 0.9 %
5 SYRINGE (ML) INJECTION PRN
Status: CANCELLED | OUTPATIENT
Start: 2019-02-07

## 2018-11-29 RX ORDER — SODIUM CHLORIDE 9 MG/ML
INJECTION, SOLUTION INTRAVENOUS ONCE
Status: CANCELLED | OUTPATIENT
Start: 2019-01-17 | End: 2019-01-17

## 2018-11-29 RX ORDER — METHYLPREDNISOLONE SODIUM SUCCINATE 125 MG/2ML
125 INJECTION, POWDER, LYOPHILIZED, FOR SOLUTION INTRAMUSCULAR; INTRAVENOUS ONCE
Status: CANCELLED | OUTPATIENT
Start: 2019-02-07 | End: 2019-02-07

## 2018-11-29 RX ORDER — SODIUM CHLORIDE 9 MG/ML
INJECTION, SOLUTION INTRAVENOUS ONCE
Status: CANCELLED | OUTPATIENT
Start: 2019-02-07 | End: 2019-02-07

## 2018-11-29 RX ORDER — 0.9 % SODIUM CHLORIDE 0.9 %
10 VIAL (ML) INJECTION ONCE
Status: CANCELLED | OUTPATIENT
Start: 2019-01-17 | End: 2019-01-17

## 2018-11-29 RX ORDER — SODIUM CHLORIDE 9 MG/ML
INJECTION, SOLUTION INTRAVENOUS CONTINUOUS
Status: CANCELLED | OUTPATIENT
Start: 2019-01-17

## 2018-11-29 RX ORDER — DIPHENHYDRAMINE HYDROCHLORIDE 50 MG/ML
50 INJECTION INTRAMUSCULAR; INTRAVENOUS ONCE
Status: CANCELLED | OUTPATIENT
Start: 2019-01-17 | End: 2019-01-17

## 2018-11-29 RX ORDER — SODIUM CHLORIDE 0.9 % (FLUSH) 0.9 %
5 SYRINGE (ML) INJECTION PRN
Status: CANCELLED | OUTPATIENT
Start: 2019-01-17

## 2018-11-29 RX ORDER — SODIUM CHLORIDE 0.9 % (FLUSH) 0.9 %
10 SYRINGE (ML) INJECTION PRN
Status: CANCELLED | OUTPATIENT
Start: 2019-02-07

## 2018-11-29 RX ADMIN — Medication 10 ML: at 08:42

## 2018-11-29 RX ADMIN — PERTUZUMAB 420 MG: 30 INJECTION, SOLUTION, CONCENTRATE INTRAVENOUS at 10:05

## 2018-11-29 RX ADMIN — PEGFILGRASTIM 6 MG: KIT SUBCUTANEOUS at 12:00

## 2018-11-29 RX ADMIN — TRASTUZUMAB 449 MG: 150 INJECTION, POWDER, LYOPHILIZED, FOR SOLUTION INTRAVENOUS at 11:25

## 2018-11-29 RX ADMIN — Medication 10 ML: at 13:16

## 2018-11-29 RX ADMIN — SODIUM CHLORIDE, PRESERVATIVE FREE 500 UNITS: 5 INJECTION INTRAVENOUS at 13:16

## 2018-11-29 RX ADMIN — Medication 10 ML: at 08:44

## 2018-11-29 RX ADMIN — PALONOSETRON 0.25 MG: 0.05 INJECTION, SOLUTION INTRAVENOUS at 10:47

## 2018-11-29 RX ADMIN — SODIUM CHLORIDE: 9 INJECTION, SOLUTION INTRAVENOUS at 09:39

## 2018-11-29 RX ADMIN — SODIUM CHLORIDE 150 MG: 900 INJECTION, SOLUTION INTRAVENOUS at 10:50

## 2018-11-29 RX ADMIN — DOCETAXEL ANHYDROUS 136 MG: 10 INJECTION, SOLUTION INTRAVENOUS at 11:58

## 2018-11-29 RX ADMIN — DEXAMETHASONE SODIUM PHOSPHATE 10 MG: 10 INJECTION INTRAMUSCULAR; INTRAVENOUS at 10:48

## 2018-11-29 NOTE — FLOWSHEET NOTE
Assessment:  greeted and visited briefly with Pt and Pt's daughter, [address], in [facility].  showed Pt a tray of rocks painted with inspirational messages by a fellow Pt and asked her to select one. Pt selected a yellow rock with the message, \"Just Breathe. \"    Pt identified her daughter and family as sources of support. Pt shared that she has learned from going through treatment that she does not need to do everything and that she could enjoy Thanksgiving with family without doing everything she used to do. Pt expressed gratitude for her grandchildren and children. Dtr offered words of encouragement to Pt who smiled. Intervention: Supportive presence, exploration of coping and resources and affirmation.  invited Pt to select a painted rock made by fellow patient. Outcome: Pt wished  a nice holiday and thanked her for the visit. Plan:  is available for emotional and spiritual support as needed.

## 2018-11-29 NOTE — PROGRESS NOTES
DIAGNOSIS:   1. Right-sided breast cancer, multifocal  2. Tumor is HER-2 and ER/RI positive  3. Evidence of gustabo metastases in the axilla  4. Staging studies show cervical adenopathy with PET activity in the neck, however, all biopsies came back negative for metastatic disease. CURRENT THERAPY:  1. Plan induction chemotherapy with TCHP followed by surgery   2. Severe dermatological side effects. Difficult to manage  3. Neutropenia from chemotherapy, managed with addition of Neulasta  4. We had to drop carboplatin after 2 cycles due to toxicity    BRIEF CASE HISTORY:   Ramirez Asher is a very pleasant 46 y.o. female who is referred to us for management recommendation about her breast cancer. She presented with mass and swelling in her right breast and axillary area. She was having significant pain so she underwent a CT scan initially than that showed significant mass in her right breast with extensive axillary adenopathy. He was clearly thickening of the skin of the breast suggestive of infiltration. The patient underwent mammogram that confirmed the presence of multifocal breast masses is highly suggestive of malignancy. She underwent image guided biopsy and that confirmed the presence of grade 2 invasive ductal carcinoma that was ER/RI and HER-2/keyona positive. The patient underwent breast MRI that showed multifocal disease with involvement of the nipple. There is no clear involvement of the muscle or the skin of the breast, there is multiple axillary nodes but there is no involvement of internal mammary lymph node or contralateral breast or lymph nodes. I reviewed the CT scan of the chest and she had a CT scan of the abdomen and pelvis a couple months ago because of abdominal pain and that showed no clear evidence of metastatic disease. She is sent to us for a consultation, she presents today accompanied by her family. She is alert and oriented and well informed about her condition.   She is w/subq port age 10 yr/> (Left, 8/13/2018). CURRENT MEDICATIONS:  has a current medication list which includes the following prescription(s): triamcinolone, omeprazole, duloxetine, furosemide, oxycodone-acetaminophen, lidocaine, tacrolimus, clobetasol, phenylephrine-mineral oil-petrolatum, montelukast, proair hfa, alprazolam, dexamethasone, lidocaine-prilocaine, ondansetron, and atorvastatin. ALLERGIES:  is allergic to dye [iodides]; eggs or egg-derived products; and shellfish-derived products. FAMILY HISTORY: family history includes Arthritis in her mother; Breast Cancer in her paternal grandmother; Cancer in her father and sister; Da Garces in an other family member; Other in her father. Specific oncological history in the family including risks cancer in her paternal grandmother who was in her 45s underwent mastectomy. Her father had lung cancer and her sister had melanoma. Colon cancer is in the distant family member    SOCIAL HISTORY:  reports that she has been smoking Cigarettes. She has been smoking about 1.00 pack per day. She has never used smokeless tobacco. She reports that she does not drink alcohol or use drugs. REVIEW OF SYSTEMS:   General: No fever or night sweats. Weight is stable. Fatigue  ENT: No double or blurred vision, no tinnitus or hearing problem, no dysphagia or sore throat. Minor mucosits  Respiratory: No chest pain, no shortness of breath, no cough or hemoptysis. Cardiovascular: Denies chest pain, PND or orthopnea, or palpitations. +LE swelling - controlled  Gastrointestinal: No vomiting, abdominal pain, constipation. + diarrhea + nausea  Genitourinary: Denies hematuria, frequency, urgency or incontinence. Neurological: Denies headaches, decreased LOC, no sensory or motor focal deficits. Grade 1 neuropathy  Musculoskeletal: No arthralgia no back pain or joint swelling. Pain in the breast as noted above  Skin:  Acral dermatitis is down to grade 1.   She has

## 2018-11-29 NOTE — LETTER
of upper-outer quadrant of breast in female, estrogen receptor positive ([facility] [address] PAST SURGICAL HISTORY: has a past surgical history that includes partial hysterectomy (cervix not removed); Bladder surgery (2000); Tonsillectomy; Breast surgery; Salpingo-oophorectomy (Left); Tunneled venous port placement (Left, 08/13/2018); and pr insj prph ctr vad w/subq port age 10 yr/> (Left, 8/13/2018). CURRENT MEDICATIONS:  has a current medication list which includes the following prescription(s): triamcinolone, omeprazole, duloxetine, furosemide, oxycodone-acetaminophen, lidocaine, tacrolimus, clobetasol, phenylephrine-mineral oil-petrolatum, montelukast, proair hfa, alprazolam, dexamethasone, lidocaine-prilocaine, ondansetron, and atorvastatin. ALLERGIES:  is allergic to dye [iodides]; eggs or egg-derived products; and shellfish-derived products. FAMILY HISTORY: family history includes Arthritis in her mother; Breast Cancer in her paternal grandmother; Cancer in her father and sister; Da Garces in an other family member; Other in her father. Specific oncological history in the family including risks cancer in her paternal grandmother who was in her 45s underwent mastectomy. Her father had lung cancer and her sister had melanoma. Colon cancer is in the distant family member    SOCIAL HISTORY:  reports that she has been smoking Cigarettes. She has been smoking about 1.00 pack per day. She has never used smokeless tobacco. She reports that she does not drink alcohol or use drugs. REVIEW OF SYSTEMS:   General: No fever or night sweats. Weight is stable. Fatigue  ENT: No double or blurred vision, no tinnitus or hearing problem, no dysphagia or sore throat. Minor mucosits  Respiratory: No chest pain, no shortness of breath, no cough or hemoptysis. Cardiovascular: Denies chest pain, PND or orthopnea, or palpitations.  +LE swelling - controlled

## 2018-11-29 NOTE — PROGRESS NOTES
Patient saw Dr. Edna Torre see his dictation. Patient tolerated infusions well amnd was discharged home with family in stable condition.

## 2018-12-03 DIAGNOSIS — C50.911 HORMONE RECEPTOR POSITIVE BREAST CANCER, RIGHT (HCC): ICD-10-CM

## 2018-12-03 RX ORDER — OXYCODONE HYDROCHLORIDE AND ACETAMINOPHEN 5; 325 MG/1; MG/1
1 TABLET ORAL 2 TIMES DAILY
Qty: 60 TABLET | Refills: 0 | Status: SHIPPED | OUTPATIENT
Start: 2018-12-03 | End: 2019-01-04 | Stop reason: SDUPTHER

## 2018-12-07 ENCOUNTER — TELEPHONE (OUTPATIENT)
Dept: ONCOLOGY | Age: 53
End: 2018-12-07

## 2018-12-20 ENCOUNTER — OFFICE VISIT (OUTPATIENT)
Dept: ONCOLOGY | Age: 53
End: 2018-12-20
Payer: COMMERCIAL

## 2018-12-20 VITALS
BODY MASS INDEX: 28.22 KG/M2 | HEART RATE: 102 BPM | SYSTOLIC BLOOD PRESSURE: 131 MMHG | TEMPERATURE: 98.1 F | RESPIRATION RATE: 16 BRPM | DIASTOLIC BLOOD PRESSURE: 72 MMHG | WEIGHT: 159.3 LBS

## 2018-12-20 DIAGNOSIS — Z17.0 MALIGNANT NEOPLASM OF UPPER-OUTER QUADRANT OF RIGHT BREAST IN FEMALE, ESTROGEN RECEPTOR POSITIVE (HCC): ICD-10-CM

## 2018-12-20 DIAGNOSIS — C50.411 MALIGNANT NEOPLASM OF UPPER-OUTER QUADRANT OF RIGHT BREAST IN FEMALE, ESTROGEN RECEPTOR POSITIVE (HCC): ICD-10-CM

## 2018-12-20 PROCEDURE — G8417 CALC BMI ABV UP PARAM F/U: HCPCS | Performed by: INTERNAL MEDICINE

## 2018-12-20 PROCEDURE — G8484 FLU IMMUNIZE NO ADMIN: HCPCS | Performed by: INTERNAL MEDICINE

## 2018-12-20 PROCEDURE — 99211 OFF/OP EST MAY X REQ PHY/QHP: CPT | Performed by: INTERNAL MEDICINE

## 2018-12-20 PROCEDURE — G8427 DOCREV CUR MEDS BY ELIG CLIN: HCPCS | Performed by: INTERNAL MEDICINE

## 2018-12-20 PROCEDURE — 99215 OFFICE O/P EST HI 40 MIN: CPT | Performed by: INTERNAL MEDICINE

## 2018-12-20 PROCEDURE — 3017F COLORECTAL CA SCREEN DOC REV: CPT | Performed by: INTERNAL MEDICINE

## 2018-12-20 PROCEDURE — 4004F PT TOBACCO SCREEN RCVD TLK: CPT | Performed by: INTERNAL MEDICINE

## 2018-12-20 RX ORDER — DULOXETIN HYDROCHLORIDE 60 MG/1
60 CAPSULE, DELAYED RELEASE ORAL DAILY
Qty: 30 CAPSULE | Refills: 0 | Status: SHIPPED | OUTPATIENT
Start: 2018-12-20 | End: 2018-12-27 | Stop reason: SDUPTHER

## 2018-12-20 NOTE — PROGRESS NOTES
axilla; finger nail loss  Psychiatric:  No anxiety, no depression. Mood swings  Endocrine: No diabetes or thyroid disease. Hematologic: No bleeding, no adenopathy. PHYSICAL EXAM: Shows a well appearing 46y.o.-year-old female who is not in pain or distress. Vital Signs: Blood pressure 131/72, pulse 102, temperature 98.1 °F (36.7 °C), temperature source Oral, resp. rate 16, weight 159 lb 4.8 oz (72.3 kg), not currently breastfeeding. HEENT: Normocephalic and atraumatic. Pupils are equal, round, reactive to light and accommodation. Extraocular muscles are intact. Neck: Showed no JVD, no carotid bruit . Lungs: Clear to auscultation bilaterally. Heart: Regular without any murmur. Abdomen: Soft, nontender. No hepatosplenomegaly. Extremities: Lower extremities show no edema, clubbing, or cyanosis. Breasts: Examination showed significant reduction in the mass of the breast .  No palpable lymph nodes appreciated. Neuro exam: intact cranial nerves bilaterally no motor or sensory deficit, gait is normal. Lymphatic: no adenopathy appreciated in the supraclavicular, axillary, cervical or inguinal area   Skin: rash between fingers, axilla    REVIEW OF LABORATORY DATA:   Lab Results   Component Value Date    WBC 12.2 (H) 11/29/2018    HGB 12.6 11/29/2018    HCT 37.9 11/29/2018    .3 (H) 11/29/2018     11/29/2018        Chemistry        Component Value Date/Time     11/29/2018 0845    K 3.8 11/29/2018 0845     11/29/2018 0845    CO2 23 11/29/2018 0845    BUN 9 11/29/2018 0845    CREATININE <0.40 (L) 11/29/2018 0845        Component Value Date/Time    CALCIUM 9.7 11/29/2018 0845    ALKPHOS 75 11/29/2018 0845    AST 13 11/29/2018 0845    ALT 10 11/29/2018 0845    BILITOT 0.29 (L) 11/29/2018 0845            REVIEW OF RADIOLOGICAL RESULTS:       IMPRESSION:   1. Clinical stage [id number] triple positive breast cancer  2.  Cervical adenopathy, for evaluation and biopsy showed no evidence of metastatic

## 2018-12-20 NOTE — LETTER
I reviewed the CT scan of the chest and she had a CT scan of the abdomen and pelvis a couple months ago because of abdominal pain and that showed no clear evidence of metastatic disease. She is sent to us for a consultation, she presents today accompanied by her family. She is alert and oriented and well informed about her condition. She is very anxious but overall is healthy and has minimal morbidity. Performance status is ECOG 0, most of her pain is related to the mass, axillary nodes as well as the biopsy area. She does not have any other bone pain or chest pain. No symptoms suggestive of metastatic disease. PET done 08/10/2018 showed metastatic disease in the right axilla as well neck bilaterally. Very thorough evaluation and sampling was done and no metastatic disease was found; flow cytometry was negative for any lymphomas. It was determined disease is localized, plan for induction chemo. She developed hand-foot syndrome during treatment and was advised to use ice on hands and feet during treatment to minimize , eventually affected vagina. She experienced GI toxicity with diarrhea, minimized with Lomotil. She experienced depression and mood swings. INTERIM HISTORY: The patient presents for follow up of breast cancer. She has completed 5 cycles of chemo. She reports recovery time between cycles is increasing. She has joint pain in the right hand, she has used steroids in the past - not currently. She has rash between fingers and axilla, she is losing finger nails. She is concerned about her insurance change with 2019 affecting her continued treatment, she will have Norlina and need referral to new oncologist. She has some neuropathy in the hands. PAST MEDICAL HISTORY: has a past medical history of Anxiety; Asthma; Depression; Headache; Hyperlipidemia; Hypertension; and Malignant neoplasm of upper-outer quadrant of breast in female, estrogen receptor positive ([facility] [address]

## 2018-12-21 ENCOUNTER — TELEPHONE (OUTPATIENT)
Dept: ONCOLOGY | Age: 53
End: 2018-12-21

## 2018-12-27 ENCOUNTER — TELEPHONE (OUTPATIENT)
Dept: ONCOLOGY | Age: 53
End: 2018-12-27

## 2018-12-27 ENCOUNTER — CLINICAL DOCUMENTATION (OUTPATIENT)
Dept: ONCOLOGY | Age: 53
End: 2018-12-27

## 2018-12-27 ENCOUNTER — HOSPITAL ENCOUNTER (OUTPATIENT)
Dept: INFUSION THERAPY | Age: 53
Discharge: HOME | End: 2018-12-27
Payer: COMMERCIAL

## 2018-12-27 VITALS
TEMPERATURE: 98.1 F | RESPIRATION RATE: 16 BRPM | HEIGHT: 63 IN | SYSTOLIC BLOOD PRESSURE: 137 MMHG | DIASTOLIC BLOOD PRESSURE: 83 MMHG | BODY MASS INDEX: 28.03 KG/M2 | WEIGHT: 158.2 LBS | HEART RATE: 120 BPM

## 2018-12-27 DIAGNOSIS — Z17.0 MALIGNANT NEOPLASM OF UPPER-OUTER QUADRANT OF RIGHT BREAST IN FEMALE, ESTROGEN RECEPTOR POSITIVE (HCC): ICD-10-CM

## 2018-12-27 DIAGNOSIS — C50.411 MALIGNANT NEOPLASM OF UPPER-OUTER QUADRANT OF RIGHT BREAST IN FEMALE, ESTROGEN RECEPTOR POSITIVE (HCC): ICD-10-CM

## 2018-12-27 LAB
ABSOLUTE EOS #: 0 K/UL (ref 0–0.4)
ABSOLUTE IMMATURE GRANULOCYTE: ABNORMAL K/UL (ref 0–0.3)
ABSOLUTE LYMPH #: 0.7 K/UL (ref 1–4.8)
ABSOLUTE MONO #: 0.6 K/UL (ref 0.1–1.2)
ALBUMIN SERPL-MCNC: 4.1 G/DL (ref 3.5–5.2)
ALBUMIN/GLOBULIN RATIO: 1.6 (ref 1–2.5)
ALP BLD-CCNC: 63 U/L (ref 35–104)
ALT SERPL-CCNC: 9 U/L (ref 5–33)
ANION GAP SERPL CALCULATED.3IONS-SCNC: 14 MMOL/L (ref 9–17)
AST SERPL-CCNC: 14 U/L
BASOPHILS # BLD: 0 % (ref 0–2)
BASOPHILS ABSOLUTE: 0 K/UL (ref 0–0.2)
BILIRUB SERPL-MCNC: 0.5 MG/DL (ref 0.3–1.2)
BUN BLDV-MCNC: 10 MG/DL (ref 6–20)
BUN/CREAT BLD: ABNORMAL (ref 9–20)
CALCIUM SERPL-MCNC: 9.4 MG/DL (ref 8.6–10.4)
CHLORIDE BLD-SCNC: 107 MMOL/L (ref 98–107)
CO2: 21 MMOL/L (ref 20–31)
CREAT SERPL-MCNC: 0.43 MG/DL (ref 0.5–0.9)
DIFFERENTIAL TYPE: ABNORMAL
EOSINOPHILS RELATIVE PERCENT: 0 % (ref 1–4)
GFR AFRICAN AMERICAN: >60 ML/MIN
GFR NON-AFRICAN AMERICAN: >60 ML/MIN
GFR SERPL CREATININE-BSD FRML MDRD: ABNORMAL ML/MIN/{1.73_M2}
GFR SERPL CREATININE-BSD FRML MDRD: ABNORMAL ML/MIN/{1.73_M2}
GLUCOSE BLD-MCNC: 151 MG/DL (ref 70–99)
HCT VFR BLD CALC: 39.3 % (ref 36–46)
HEMOGLOBIN: 13 G/DL (ref 12–16)
IMMATURE GRANULOCYTES: ABNORMAL %
LYMPHOCYTES # BLD: 10 % (ref 24–44)
MCH RBC QN AUTO: 34.3 PG (ref 26–34)
MCHC RBC AUTO-ENTMCNC: 33 G/DL (ref 31–37)
MCV RBC AUTO: 103.9 FL (ref 80–100)
MONOCYTES # BLD: 8 % (ref 2–11)
NRBC AUTOMATED: ABNORMAL PER 100 WBC
PDW BLD-RTO: 16.7 % (ref 12.5–15.4)
PLATELET # BLD: 427 K/UL (ref 140–450)
PLATELET ESTIMATE: ABNORMAL
PMV BLD AUTO: 7.8 FL (ref 6–12)
POTASSIUM SERPL-SCNC: 4 MMOL/L (ref 3.7–5.3)
RBC # BLD: 3.79 M/UL (ref 4–5.2)
RBC # BLD: ABNORMAL 10*6/UL
SEG NEUTROPHILS: 82 % (ref 36–66)
SEGMENTED NEUTROPHILS ABSOLUTE COUNT: 5.4 K/UL (ref 1.8–7.7)
SODIUM BLD-SCNC: 142 MMOL/L (ref 135–144)
TOTAL PROTEIN: 6.7 G/DL (ref 6.4–8.3)
WBC # BLD: 6.7 K/UL (ref 3.5–11)
WBC # BLD: ABNORMAL 10*3/UL

## 2018-12-27 PROCEDURE — 96413 CHEMO IV INFUSION 1 HR: CPT

## 2018-12-27 PROCEDURE — 96375 TX/PRO/DX INJ NEW DRUG ADDON: CPT

## 2018-12-27 PROCEDURE — 85025 COMPLETE CBC W/AUTO DIFF WBC: CPT

## 2018-12-27 PROCEDURE — 2580000003 HC RX 258: Performed by: INTERNAL MEDICINE

## 2018-12-27 PROCEDURE — 36591 DRAW BLOOD OFF VENOUS DEVICE: CPT

## 2018-12-27 PROCEDURE — 96417 CHEMO IV INFUS EACH ADDL SEQ: CPT

## 2018-12-27 PROCEDURE — 96377 APPLICATON ON-BODY INJECTOR: CPT

## 2018-12-27 PROCEDURE — 6360000002 HC RX W HCPCS: Performed by: INTERNAL MEDICINE

## 2018-12-27 PROCEDURE — 80053 COMPREHEN METABOLIC PANEL: CPT

## 2018-12-27 PROCEDURE — 96367 TX/PROPH/DG ADDL SEQ IV INF: CPT

## 2018-12-27 RX ORDER — DULOXETIN HYDROCHLORIDE 60 MG/1
60 CAPSULE, DELAYED RELEASE ORAL DAILY
Qty: 30 CAPSULE | Refills: 0 | Status: SHIPPED | OUTPATIENT
Start: 2018-12-27 | End: 2019-02-22 | Stop reason: SDUPTHER

## 2018-12-27 RX ORDER — HEPARIN SODIUM (PORCINE) LOCK FLUSH IV SOLN 100 UNIT/ML 100 UNIT/ML
500 SOLUTION INTRAVENOUS PRN
Status: DISCONTINUED | OUTPATIENT
Start: 2018-12-27 | End: 2018-12-28 | Stop reason: HOSPADM

## 2018-12-27 RX ORDER — PALONOSETRON 0.05 MG/ML
0.25 INJECTION, SOLUTION INTRAVENOUS ONCE
Status: COMPLETED | OUTPATIENT
Start: 2018-12-27 | End: 2018-12-27

## 2018-12-27 RX ORDER — SODIUM CHLORIDE 9 MG/ML
INJECTION, SOLUTION INTRAVENOUS ONCE
Status: COMPLETED | OUTPATIENT
Start: 2018-12-27 | End: 2018-12-27

## 2018-12-27 RX ORDER — SODIUM CHLORIDE 0.9 % (FLUSH) 0.9 %
10 SYRINGE (ML) INJECTION PRN
Status: DISCONTINUED | OUTPATIENT
Start: 2018-12-27 | End: 2018-12-28 | Stop reason: HOSPADM

## 2018-12-27 RX ORDER — DEXAMETHASONE SODIUM PHOSPHATE 10 MG/ML
10 INJECTION INTRAMUSCULAR; INTRAVENOUS ONCE
Status: COMPLETED | OUTPATIENT
Start: 2018-12-27 | End: 2018-12-27

## 2018-12-27 RX ADMIN — Medication 10 ML: at 09:00

## 2018-12-27 RX ADMIN — PERTUZUMAB 420 MG: 30 INJECTION, SOLUTION, CONCENTRATE INTRAVENOUS at 10:51

## 2018-12-27 RX ADMIN — DOCETAXEL ANHYDROUS 110 MG: 10 INJECTION, SOLUTION INTRAVENOUS at 12:41

## 2018-12-27 RX ADMIN — SODIUM CHLORIDE 150 MG: 900 INJECTION, SOLUTION INTRAVENOUS at 10:10

## 2018-12-27 RX ADMIN — Medication 10 ML: at 09:01

## 2018-12-27 RX ADMIN — SODIUM CHLORIDE: 9 INJECTION, SOLUTION INTRAVENOUS at 09:50

## 2018-12-27 RX ADMIN — PALONOSETRON 0.25 MG: 0.05 INJECTION, SOLUTION INTRAVENOUS at 09:55

## 2018-12-27 RX ADMIN — SODIUM CHLORIDE, PRESERVATIVE FREE 500 UNITS: 5 INJECTION INTRAVENOUS at 13:54

## 2018-12-27 RX ADMIN — Medication 10 ML: at 13:54

## 2018-12-27 RX ADMIN — TRASTUZUMAB 450 MG: 150 INJECTION, POWDER, LYOPHILIZED, FOR SOLUTION INTRAVENOUS at 11:58

## 2018-12-27 RX ADMIN — DEXAMETHASONE SODIUM PHOSPHATE 10 MG: 10 INJECTION INTRAMUSCULAR; INTRAVENOUS at 09:56

## 2018-12-27 RX ADMIN — PEGFILGRASTIM 6 MG: KIT SUBCUTANEOUS at 13:51

## 2018-12-27 ASSESSMENT — PAIN DESCRIPTION - LOCATION: LOCATION: LEG;BACK

## 2018-12-27 ASSESSMENT — PAIN SCALES - GENERAL: PAINLEVEL_OUTOF10: 5

## 2018-12-27 ASSESSMENT — PAIN DESCRIPTION - PAIN TYPE: TYPE: ACUTE PAIN

## 2018-12-27 NOTE — PROGRESS NOTES
Pt here for C6D1. Denies any new complaints. Labs drawn from port and results reviewed. Pt was treated without incident and d/c'd in stable condition.

## 2019-01-04 DIAGNOSIS — C50.911 HORMONE RECEPTOR POSITIVE BREAST CANCER, RIGHT (HCC): ICD-10-CM

## 2019-01-04 RX ORDER — OXYCODONE HYDROCHLORIDE AND ACETAMINOPHEN 5; 325 MG/1; MG/1
1 TABLET ORAL 2 TIMES DAILY
Qty: 60 TABLET | Refills: 0 | Status: SHIPPED | OUTPATIENT
Start: 2019-01-04 | End: 2019-02-22 | Stop reason: SDUPTHER

## 2019-01-08 RX ORDER — ATORVASTATIN CALCIUM 10 MG/1
TABLET, FILM COATED ORAL
Qty: 90 TABLET | Refills: 1 | Status: SHIPPED | OUTPATIENT
Start: 2019-01-08 | End: 2019-09-15 | Stop reason: SDUPTHER

## 2019-01-10 ENCOUNTER — TELEPHONE (OUTPATIENT)
Dept: ONCOLOGY | Age: 54
End: 2019-01-10

## 2019-01-28 ENCOUNTER — OFFICE VISIT (OUTPATIENT)
Dept: FAMILY MEDICINE CLINIC | Age: 54
End: 2019-01-28
Payer: COMMERCIAL

## 2019-01-28 VITALS
DIASTOLIC BLOOD PRESSURE: 82 MMHG | WEIGHT: 158 LBS | BODY MASS INDEX: 27.99 KG/M2 | HEART RATE: 100 BPM | SYSTOLIC BLOOD PRESSURE: 136 MMHG | OXYGEN SATURATION: 98 % | TEMPERATURE: 96.8 F

## 2019-01-28 DIAGNOSIS — R05.9 COUGH: Primary | ICD-10-CM

## 2019-01-28 DIAGNOSIS — J02.9 SORE THROAT: ICD-10-CM

## 2019-01-28 DIAGNOSIS — J01.10 ACUTE NON-RECURRENT FRONTAL SINUSITIS: ICD-10-CM

## 2019-01-28 LAB
INFLUENZA A ANTIBODY: NORMAL
INFLUENZA B ANTIBODY: NORMAL
S PYO AG THROAT QL: NORMAL

## 2019-01-28 PROCEDURE — 99213 OFFICE O/P EST LOW 20 MIN: CPT | Performed by: FAMILY MEDICINE

## 2019-01-28 PROCEDURE — 87804 INFLUENZA ASSAY W/OPTIC: CPT | Performed by: FAMILY MEDICINE

## 2019-01-28 PROCEDURE — 87880 STREP A ASSAY W/OPTIC: CPT | Performed by: FAMILY MEDICINE

## 2019-01-28 PROCEDURE — 96372 THER/PROPH/DIAG INJ SC/IM: CPT | Performed by: FAMILY MEDICINE

## 2019-01-28 RX ORDER — PREDNISONE 20 MG/1
40 TABLET ORAL DAILY
Qty: 10 TABLET | Refills: 0 | Status: SHIPPED | OUTPATIENT
Start: 2019-01-28 | End: 2019-01-31

## 2019-01-28 RX ORDER — GUAIFENESIN 600 MG/1
600 TABLET, EXTENDED RELEASE ORAL 2 TIMES DAILY
Qty: 30 TABLET | Refills: 0 | Status: SHIPPED | OUTPATIENT
Start: 2019-01-28 | End: 2019-02-12

## 2019-01-28 RX ORDER — CEFTRIAXONE 500 MG/1
500 INJECTION, POWDER, FOR SOLUTION INTRAMUSCULAR; INTRAVENOUS ONCE
Status: COMPLETED | OUTPATIENT
Start: 2019-01-28 | End: 2019-01-28

## 2019-01-28 RX ADMIN — CEFTRIAXONE 500 MG: 500 INJECTION, POWDER, FOR SOLUTION INTRAMUSCULAR; INTRAVENOUS at 16:15

## 2019-01-28 ASSESSMENT — PATIENT HEALTH QUESTIONNAIRE - PHQ9
SUM OF ALL RESPONSES TO PHQ QUESTIONS 1-9: 0
2. FEELING DOWN, DEPRESSED OR HOPELESS: 0
1. LITTLE INTEREST OR PLEASURE IN DOING THINGS: 0
SUM OF ALL RESPONSES TO PHQ9 QUESTIONS 1 & 2: 0
SUM OF ALL RESPONSES TO PHQ QUESTIONS 1-9: 0

## 2019-02-06 DIAGNOSIS — F41.9 ANXIETY: ICD-10-CM

## 2019-02-06 RX ORDER — ALPRAZOLAM 0.5 MG/1
TABLET ORAL
Qty: 90 TABLET | Refills: 0 | Status: SHIPPED | OUTPATIENT
Start: 2019-02-06 | End: 2019-03-08

## 2019-02-22 DIAGNOSIS — C50.911 HORMONE RECEPTOR POSITIVE BREAST CANCER, RIGHT (HCC): ICD-10-CM

## 2019-02-22 RX ORDER — OXYCODONE HYDROCHLORIDE AND ACETAMINOPHEN 5; 325 MG/1; MG/1
1 TABLET ORAL 2 TIMES DAILY
Qty: 60 TABLET | Refills: 0 | Status: SHIPPED | OUTPATIENT
Start: 2019-02-22 | End: 2019-03-14 | Stop reason: SDUPTHER

## 2019-02-22 RX ORDER — DULOXETIN HYDROCHLORIDE 60 MG/1
60 CAPSULE, DELAYED RELEASE ORAL DAILY
Qty: 30 CAPSULE | Refills: 0 | Status: SHIPPED | OUTPATIENT
Start: 2019-02-22 | End: 2019-04-30 | Stop reason: SDUPTHER

## 2019-02-25 RX ORDER — DULOXETIN HYDROCHLORIDE 30 MG/1
CAPSULE, DELAYED RELEASE ORAL
Qty: 30 CAPSULE | Refills: 3 | Status: SHIPPED | OUTPATIENT
Start: 2019-02-25 | End: 2019-04-30 | Stop reason: SDUPTHER

## 2019-03-14 ENCOUNTER — OFFICE VISIT (OUTPATIENT)
Dept: FAMILY MEDICINE CLINIC | Age: 54
End: 2019-03-14
Payer: COMMERCIAL

## 2019-03-14 VITALS
HEART RATE: 100 BPM | SYSTOLIC BLOOD PRESSURE: 139 MMHG | DIASTOLIC BLOOD PRESSURE: 87 MMHG | BODY MASS INDEX: 28.34 KG/M2 | TEMPERATURE: 97.4 F | OXYGEN SATURATION: 97 % | WEIGHT: 160 LBS

## 2019-03-14 DIAGNOSIS — H66.001 ACUTE SUPPURATIVE OTITIS MEDIA OF RIGHT EAR WITHOUT SPONTANEOUS RUPTURE OF TYMPANIC MEMBRANE, RECURRENCE NOT SPECIFIED: Primary | ICD-10-CM

## 2019-03-14 DIAGNOSIS — C50.911 HORMONE RECEPTOR POSITIVE BREAST CANCER, RIGHT (HCC): ICD-10-CM

## 2019-03-14 PROCEDURE — 99213 OFFICE O/P EST LOW 20 MIN: CPT | Performed by: FAMILY MEDICINE

## 2019-03-14 RX ORDER — AMOXICILLIN AND CLAVULANATE POTASSIUM 875; 125 MG/1; MG/1
1 TABLET, FILM COATED ORAL 2 TIMES DAILY
Qty: 20 TABLET | Refills: 0 | Status: SHIPPED | OUTPATIENT
Start: 2019-03-14 | End: 2019-03-15 | Stop reason: ALTCHOICE

## 2019-03-14 RX ORDER — CIPROFLOXACIN AND DEXAMETHASONE 3; 1 MG/ML; MG/ML
4 SUSPENSION/ DROPS AURICULAR (OTIC) 2 TIMES DAILY
Qty: 1 BOTTLE | Refills: 0 | Status: SHIPPED | OUTPATIENT
Start: 2019-03-14 | End: 2019-08-13 | Stop reason: SDUPTHER

## 2019-03-14 RX ORDER — OXYCODONE HYDROCHLORIDE AND ACETAMINOPHEN 5; 325 MG/1; MG/1
1 TABLET ORAL EVERY 8 HOURS PRN
Qty: 60 TABLET | Refills: 0 | Status: SHIPPED | OUTPATIENT
Start: 2019-03-14 | End: 2019-04-08 | Stop reason: SDUPTHER

## 2019-03-15 ENCOUNTER — TELEPHONE (OUTPATIENT)
Dept: FAMILY MEDICINE CLINIC | Age: 54
End: 2019-03-15

## 2019-03-15 RX ORDER — AMOXICILLIN 500 MG/1
500 CAPSULE ORAL 3 TIMES DAILY
Qty: 30 CAPSULE | Refills: 0 | Status: SHIPPED | OUTPATIENT
Start: 2019-03-15 | End: 2019-03-25

## 2019-03-15 NOTE — TELEPHONE ENCOUNTER
I changed her antibiotic to only amoxicillin. Hopefully this will help. But also please use the eardrops. Call after hours and even on the weekend if needed. Thank you.

## 2019-03-24 ENCOUNTER — TELEPHONE (OUTPATIENT)
Dept: ONCOLOGY | Age: 54
End: 2019-03-24

## 2019-04-08 DIAGNOSIS — C50.911 HORMONE RECEPTOR POSITIVE BREAST CANCER, RIGHT (HCC): ICD-10-CM

## 2019-04-08 RX ORDER — OXYCODONE HYDROCHLORIDE AND ACETAMINOPHEN 5; 325 MG/1; MG/1
1 TABLET ORAL EVERY 8 HOURS PRN
Qty: 60 TABLET | Refills: 0 | Status: SHIPPED | OUTPATIENT
Start: 2019-04-08 | End: 2019-05-21 | Stop reason: SDUPTHER

## 2019-04-08 NOTE — TELEPHONE ENCOUNTER
Medication: Percocet 5/325  Last refill: 03/14/19  [facility] [address]  PCP:           Ela Mcclellan MD    Last visit: 03/14/19  Next visit: Visit date not found  Does patient have enough medication for 72 hours: Yes    Controlled Substance Only:     Last Refill: 03/14/19  Last medication contract documentation:(this must be documented using Rx Monitoring process to be updated)  No data recorded    Last urine drug screen: na  Consistent with medication(s):   Yes  @PRINT[employer]([id number])@     [place]AR[place] Review for Controlled medication update  RX Monitoring 2/6/2019 7/14/2018 12/7/2015   Attestation The Prescription Monitoring Report for this patient was reviewed today. The Prescription Monitoring Report for this patient was reviewed today. The Prescription Monitoring Report for this patient was reviewed today. Acute Pain Prescriptions - (No Data) -   Chronic Pain Routine Monitoring Possible medication side effects, risk of tolerance/dependence & alternative treatments discussed. ;No signs of potential drug abuse or diversion identified. No signs of potential drug abuse or diversion identified. Possible medication side effects, risk of tolerance and/or dependence, and alternative treatments discussed; No signs of potential drug abuse or diversion identified. All Future Testing planned in [facility]  Lab Frequency Next Occurrence   Echo Complete Once 08/14/2018   FLUORO FOR SURGICAL PROCEDURES Once 08/13/2018   JESSY DIAGNOSTIC W CAD RIGHT Once 12/06/2018       All future appointments  No future appointments.     Health Maintenance   Topic Date Due    Hepatitis C screen  1965    HIV screen  01/30/1979    Diabetes screen  01/30/2004    Shingles Vaccine (1 of 2) 01/30/2014    Cervical cancer screen  04/10/2016    Breast cancer screen  11/01/2019    Potassium monitoring  12/27/2019    Creatinine monitoring  12/27/2019    Lipid screen  02/10/2021    DTaP/Tdap/Td vaccine (2 - Td) 12/12/2026  Colon cancer screen colonoscopy  05/14/2028    Flu vaccine  Completed    Pneumococcal 0-64 years at Risk Vaccine  Completed       No results found for: LABA1C                                                                  ( goal A1C is < 7)   No results found for: LABMICR  LDL Cholesterol (mg/dL)   Date Value   02/10/2016 93                                                  (goal LDL is <100)   AST (U/L)   Date Value   12/27/2018 14     ALT (U/L)   Date Value   12/27/2018 9     BUN (mg/dL)   Date Value   12/27/2018 10     BP Readings from Last 3 Encounters:   03/14/19 139/87   01/28/19 136/82   12/27/18 137/83                                                                                     (goal 120/80)      Patient Active Problem List:     H/O severe sun exposure     Chronic tension-type headache, intractable     Essential hypertension     Microscopic hematuria     Lower abdominal pain     Malignant neoplasm of upper-outer quadrant of breast in female, estrogen receptor positive ([facility] [address]     Chemotherapy induced diarrhea     Chemotherapy-induced acral erythema

## 2019-04-30 RX ORDER — DULOXETIN HYDROCHLORIDE 60 MG/1
CAPSULE, DELAYED RELEASE ORAL
Qty: 30 CAPSULE | Refills: 0 | Status: SHIPPED | OUTPATIENT
Start: 2019-04-30 | End: 2019-06-10 | Stop reason: SDUPTHER

## 2019-05-21 DIAGNOSIS — C50.911 HORMONE RECEPTOR POSITIVE BREAST CANCER, RIGHT (HCC): ICD-10-CM

## 2019-05-21 RX ORDER — OXYCODONE HYDROCHLORIDE AND ACETAMINOPHEN 5; 325 MG/1; MG/1
1 TABLET ORAL EVERY 8 HOURS PRN
Qty: 60 TABLET | Refills: 0 | Status: SHIPPED | OUTPATIENT
Start: 2019-05-21 | End: 2019-06-10 | Stop reason: SDUPTHER

## 2019-06-10 ENCOUNTER — OFFICE VISIT (OUTPATIENT)
Dept: FAMILY MEDICINE CLINIC | Age: 54
End: 2019-06-10
Payer: COMMERCIAL

## 2019-06-10 VITALS
HEART RATE: 119 BPM | BODY MASS INDEX: 27.99 KG/M2 | WEIGHT: 158 LBS | TEMPERATURE: 98.2 F | OXYGEN SATURATION: 96 % | SYSTOLIC BLOOD PRESSURE: 137 MMHG | DIASTOLIC BLOOD PRESSURE: 87 MMHG

## 2019-06-10 DIAGNOSIS — L73.9 FOLLICULITIS: ICD-10-CM

## 2019-06-10 DIAGNOSIS — C50.911 HORMONE RECEPTOR POSITIVE BREAST CANCER, RIGHT (HCC): ICD-10-CM

## 2019-06-10 DIAGNOSIS — Z17.0 MALIGNANT NEOPLASM OF LOWER-OUTER QUADRANT OF RIGHT BREAST OF FEMALE, ESTROGEN RECEPTOR POSITIVE (HCC): ICD-10-CM

## 2019-06-10 DIAGNOSIS — G44.221 CHRONIC TENSION-TYPE HEADACHE, INTRACTABLE: Primary | ICD-10-CM

## 2019-06-10 DIAGNOSIS — C50.511 MALIGNANT NEOPLASM OF LOWER-OUTER QUADRANT OF RIGHT BREAST OF FEMALE, ESTROGEN RECEPTOR POSITIVE (HCC): ICD-10-CM

## 2019-06-10 PROCEDURE — 99213 OFFICE O/P EST LOW 20 MIN: CPT | Performed by: FAMILY MEDICINE

## 2019-06-10 RX ORDER — RIZATRIPTAN BENZOATE 5 MG/1
5 TABLET ORAL
Qty: 30 TABLET | Refills: 3 | Status: SHIPPED | OUTPATIENT
Start: 2019-06-10 | End: 2021-03-23

## 2019-06-10 RX ORDER — DULOXETIN HYDROCHLORIDE 30 MG/1
30 CAPSULE, DELAYED RELEASE ORAL DAILY
COMMUNITY
End: 2019-06-10 | Stop reason: SDUPTHER

## 2019-06-10 RX ORDER — DULOXETIN HYDROCHLORIDE 60 MG/1
CAPSULE, DELAYED RELEASE ORAL
Qty: 90 CAPSULE | Refills: 1 | Status: SHIPPED | OUTPATIENT
Start: 2019-06-10 | End: 2019-12-11 | Stop reason: SDUPTHER

## 2019-06-10 RX ORDER — ALPRAZOLAM 0.5 MG/1
0.5 TABLET ORAL NIGHTLY PRN
Qty: 30 TABLET | Refills: 0 | Status: SHIPPED | OUTPATIENT
Start: 2019-06-10 | End: 2019-12-28

## 2019-06-10 RX ORDER — ALPRAZOLAM 0.5 MG/1
0.5 TABLET ORAL
COMMUNITY
End: 2019-06-10 | Stop reason: SDUPTHER

## 2019-06-10 RX ORDER — PROCHLORPERAZINE MALEATE 10 MG
10 TABLET ORAL EVERY 6 HOURS PRN
COMMUNITY

## 2019-06-10 RX ORDER — CEPHALEXIN 500 MG/1
500 CAPSULE ORAL 2 TIMES DAILY
Qty: 14 CAPSULE | Refills: 0 | Status: SHIPPED | OUTPATIENT
Start: 2019-06-10 | End: 2019-06-17

## 2019-06-10 RX ORDER — OXYCODONE HYDROCHLORIDE AND ACETAMINOPHEN 5; 325 MG/1; MG/1
1 TABLET ORAL EVERY 8 HOURS PRN
Qty: 60 TABLET | Refills: 0 | Status: SHIPPED | OUTPATIENT
Start: 2019-06-10 | End: 2019-07-09 | Stop reason: SDUPTHER

## 2019-06-10 RX ORDER — DULOXETIN HYDROCHLORIDE 30 MG/1
30 CAPSULE, DELAYED RELEASE ORAL DAILY
Qty: 90 CAPSULE | Refills: 1 | Status: SHIPPED | OUTPATIENT
Start: 2019-06-10 | End: 2020-03-18 | Stop reason: SDUPTHER

## 2019-06-10 NOTE — PROGRESS NOTES
Visit Information    Have you changed or started any medications since your last visit including any over-the-counter medicines, vitamins, or herbal medicines? no   Are you having any side effects from any of your medications? -  no  Have you stopped taking any of your medications? Is so, why? -  no    Have you seen any other physician or provider since your last visit? No  Have you had any other diagnostic tests since your last visit? No  Have you been seen in the emergency room and/or had an admission to a hospital since we last saw you? No  Have you had your routine dental cleaning in the past 6 months? no    Have you activated your [employer] account? If not, what are your barriers?  Yes     Patient Care Team:  Santy Napier MD as PCP - [facility]  Santy Napier MD as PCP - [facility]    Medical History Review  Past Medical, Family, and Social History reviewed and does not contribute to the patient presenting condition    Health Maintenance   Topic Date Due    Hepatitis C screen  1965    HIV screen  01/30/1979    Diabetes screen  01/30/2004    Shingles Vaccine (1 of 2) 01/30/2014    Cervical cancer screen  04/10/2016    Breast cancer screen  11/01/2019    Potassium monitoring  12/27/2019    Creatinine monitoring  12/27/2019    Lipid screen  02/10/2021    DTaP/Tdap/Td vaccine (2 - Td) 12/12/2026    Colon cancer screen colonoscopy  05/14/2028    Flu vaccine  Completed    Pneumococcal 0-64 years Vaccine  Completed

## 2019-06-10 NOTE — PROGRESS NOTES
General FM note    Ubaldo Lundborg is a 53 y.o. female who presents today for follow up on her  medical conditions as noted below.   Ubaldo Lundborg is c/o of   Chief Complaint   Patient presents with    Medication Check    Headache       Patient Active Problem List:     H/O severe sun exposure     Chronic tension-type headache, intractable     Essential hypertension     Microscopic hematuria     Lower abdominal pain     Malignant neoplasm of upper-outer quadrant of breast in female, estrogen receptor positive ([address]     Chemotherapy induced diarrhea     Chemotherapy-induced acral erythema     Past Medical History:   Diagnosis Date    Anxiety     Asthma     Depression     Headache     Hyperlipidemia     Hypertension     Malignant neoplasm of upper-outer quadrant of breast in female, estrogen receptor positive ([address] 8/7/2018      Past Surgical History:   Procedure Laterality Date    BLADDER SURGERY  2000    BREAST SURGERY      cyst removed left breast    PARTIAL HYSTERECTOMY      OK INSJ PRPH CTR VAD W/SUBQ PORT AGE 4 YR/> Left 8/13/2018    PORT INSERTION WITH US AND FLUORO performed by Jennifer Ferro MD at [address] SALPINGO-OOPHORECTOMY Left     ectopic pregnancy treated by Dr. Tiffanie Knowles TUNNELED VENOUS PORT PLACEMENT Left 08/13/2018    chemo port left chest     Family History   Problem Relation Age of Onset    Arthritis Mother         rheumatoid    Cancer Father         lung cancer    Other Father         circulatory issues    Breast Cancer Paternal Grandmother     Cancer Sister         skin cancer    Colon Cancer Other      Current Outpatient Medications   Medication Sig Dispense Refill    ADO-trastuzumab emtansine (KADCYLA) 100 MG SOLR chemo injection Infuse intravenously      prochlorperazine (COMPAZINE) 10 MG tablet Take 10 mg by mouth every 6 hours as needed      DULoxetine (CYMBALTA) 30 MG extended release capsule Take 1 capsule by mouth daily 90 capsule 1    Types: Cigarettes    Smokeless tobacco: Never Used   Substance Use Topics    Alcohol use: No     Alcohol/week: 0.0 oz     Comment: social      Body mass index is 27.99 kg/m². /87   Pulse 119   Temp 98.2 °F (36.8 °C) (Oral)   Wt 158 lb (71.7 kg)   SpO2 96%   BMI 27.99 kg/m²     Subjective:      HPI    53-year-old female with a past medical history of metastatic ductal carcinoma right breast diagnosed in July 2018 status post neoadjuvant chemotherapy and status post radiation after mastectomy right. The patient has been giving now Herceptin every 4 weeks. Patient feels that this medication causes her quite a headache. She states that she gets Herceptin couple days afterwards she has severe headaches. She has been takingTylenol but also Percocet which was prescribed for the severe pain due to advanced breast cancer. Patient feels that the headache then goes away after couple of days but then she is due for the next treatment. Her last infusion was end of May. Patient is also concerned about a red area at her right upper arm this is the arm where she had the lymph node ectomy's. She states that she does not have a lot of feeling there but she feels when palpating some pressure. There is no drainage again some redness this started 3 days ago. Her oncologist increased the Cymbalta to 90 mg a day. Patient feels that has been working for her depression and also for pain. But she states that her family is but it seems like there is no rent. She states that she does not really get the support she needs. She has been working she has been paying bills she has been managing the life as before but she feels that she is just tired she is very depressed even though medication is Cymbalta has helped. She states that her  comes home and she does not talk a lot to her. In both of her daughters more or less ignore her disease.   She feels that she is very sick but then her family thinks because she is states she does everything as before nothing is going on. The patient wishes that she could go to a friend to [place] for couple of weeks just to get away. Review of Systems   Constitutional: Negative for fever and unexpected weight change. Respiratory: Negative for cough and shortness of breath. Cardiovascular: Negative for chest pain and leg swelling. Gastrointestinal: Negative for diarrhea, constipation and blood in stool. Musculoskeletal: Negative for back pain and gait problem. Skin: Negative for color change and rash. Positive for skin infection right upper arm. Neurological: Negative for dizziness and headaches. Psychiatric/Behavioral: Negative for confusion and agitation. Positive for depression due to breast cancer. Objective:   Physical Exam  Constitutional: VS (see above). General appearance: normal development, habitus and attention, no deformities. No distress. Eyes: normal conjunctiva and lids. CAV: RRR, no RMG. No edema lower extremities. Pulmo: CTA bilateral, no CWR. Skin: no rashes, lesions or ulcers. Musculoskeletal: normal gait. Nails: no clubbing or cyanosis. Psychiatric: alert and oriented to place, time and person. Normal mood and affect. Assessment:       Diagnosis Orders   1. Chronic tension-type headache, intractable  DULoxetine (CYMBALTA) 30 MG extended release capsule    DULoxetine (CYMBALTA) 60 MG extended release capsule    ALPRAZolam (XANAX) 0.5 MG tablet    rizatriptan (MAXALT) 5 MG tablet   2. Malignant neoplasm of lower-outer quadrant of right breast of female, estrogen receptor positive (HCC)  DULoxetine (CYMBALTA) 30 MG extended release capsule    DULoxetine (CYMBALTA) 60 MG extended release capsule    ALPRAZolam (XANAX) 0.5 MG tablet    rizatriptan (MAXALT) 5 MG tablet    oxyCODONE-acetaminophen (PERCOCET) 5-325 MG per tablet   3.  Hormone receptor positive breast cancer, right (HCC)  oxyCODONE-acetaminophen (PERCOCET) 5-325 MG per tablet counseling on the following healthy behaviors: nutrition, exercise and medication adherence  Reviewed prior labs and health maintenance. Continue current medications, diet and exercise. Discussed use, benefit, and side effects of prescribed medications. Barriers to medication compliance addressed. Patient given educational materials - see patient instructions. All patient questions answered. Patient voiced understanding.       Electronically signed by Petrona Medina MD on 6/11/2019 at 5:24 AM       (Please note that portions of this note were completed with a voice recognition program. Efforts were made to edit the dictations but occasionally words are mis-transcribed.)

## 2019-06-25 ENCOUNTER — TELEPHONE (OUTPATIENT)
Dept: FAMILY MEDICINE CLINIC | Age: 54
End: 2019-06-25

## 2019-06-25 DIAGNOSIS — C50.411 MALIGNANT NEOPLASM OF UPPER-OUTER QUADRANT OF RIGHT BREAST IN FEMALE, ESTROGEN RECEPTOR POSITIVE (HCC): Primary | ICD-10-CM

## 2019-06-25 DIAGNOSIS — Z17.0 MALIGNANT NEOPLASM OF UPPER-OUTER QUADRANT OF RIGHT BREAST IN FEMALE, ESTROGEN RECEPTOR POSITIVE (HCC): Primary | ICD-10-CM

## 2019-06-25 DIAGNOSIS — G44.221 CHRONIC TENSION-TYPE HEADACHE, INTRACTABLE: ICD-10-CM

## 2019-06-25 DIAGNOSIS — L27.1 CHEMOTHERAPY-INDUCED ACRAL ERYTHEMA: ICD-10-CM

## 2019-06-25 RX ORDER — OXYCODONE AND ACETAMINOPHEN 10; 325 MG/1; MG/1
1 TABLET ORAL 2 TIMES DAILY
Qty: 28 TABLET | Refills: 0 | Status: SHIPPED | OUTPATIENT
Start: 2019-06-25 | End: 2019-07-09

## 2019-06-25 NOTE — TELEPHONE ENCOUNTER
SHAREE.  I called the patient and the patient tells me due to her current chemotherapy treatment she has been having increased headaches. The patient was diagnosed with advanced breast cancer right just couple of months ago. She has been using the Percocet at least twice a day in the morning twice a day at night because she is not able to bear the pain. She talked with her oncologist who will change treatment latest by September. So the patient hopes that her pain will improve but she needs additional pain medication right now. Patient is aware of the abuse of the character of this medication.

## 2019-07-09 DIAGNOSIS — C50.511 MALIGNANT NEOPLASM OF LOWER-OUTER QUADRANT OF RIGHT BREAST OF FEMALE, ESTROGEN RECEPTOR POSITIVE (HCC): ICD-10-CM

## 2019-07-09 DIAGNOSIS — Z17.0 MALIGNANT NEOPLASM OF LOWER-OUTER QUADRANT OF RIGHT BREAST OF FEMALE, ESTROGEN RECEPTOR POSITIVE (HCC): ICD-10-CM

## 2019-07-09 DIAGNOSIS — C50.911 HORMONE RECEPTOR POSITIVE BREAST CANCER, RIGHT (HCC): ICD-10-CM

## 2019-07-09 RX ORDER — OXYCODONE HYDROCHLORIDE AND ACETAMINOPHEN 5; 325 MG/1; MG/1
1 TABLET ORAL EVERY 8 HOURS PRN
Qty: 60 TABLET | Refills: 0 | Status: SHIPPED | OUTPATIENT
Start: 2019-07-09 | End: 2019-08-20 | Stop reason: SDUPTHER

## 2019-08-05 ENCOUNTER — OFFICE VISIT (OUTPATIENT)
Dept: FAMILY MEDICINE CLINIC | Age: 54
End: 2019-08-05
Payer: COMMERCIAL

## 2019-08-05 VITALS
HEART RATE: 80 BPM | BODY MASS INDEX: 28.17 KG/M2 | DIASTOLIC BLOOD PRESSURE: 87 MMHG | SYSTOLIC BLOOD PRESSURE: 136 MMHG | WEIGHT: 159 LBS

## 2019-08-05 DIAGNOSIS — C50.411 MALIGNANT NEOPLASM OF UPPER-OUTER QUADRANT OF RIGHT BREAST IN FEMALE, ESTROGEN RECEPTOR POSITIVE (HCC): Primary | ICD-10-CM

## 2019-08-05 DIAGNOSIS — G44.221 CHRONIC TENSION-TYPE HEADACHE, INTRACTABLE: ICD-10-CM

## 2019-08-05 DIAGNOSIS — Z17.0 MALIGNANT NEOPLASM OF UPPER-OUTER QUADRANT OF RIGHT BREAST IN FEMALE, ESTROGEN RECEPTOR POSITIVE (HCC): Primary | ICD-10-CM

## 2019-08-05 PROCEDURE — 99213 OFFICE O/P EST LOW 20 MIN: CPT | Performed by: FAMILY MEDICINE

## 2019-08-05 RX ORDER — SUMATRIPTAN 20 MG/1
1 SPRAY NASAL DAILY PRN
Qty: 1 INHALER | Refills: 3 | Status: SHIPPED | OUTPATIENT
Start: 2019-08-05 | End: 2020-09-24 | Stop reason: SDUPTHER

## 2019-08-05 RX ORDER — OXYCODONE AND ACETAMINOPHEN 10; 325 MG/1; MG/1
1 TABLET ORAL 2 TIMES DAILY
Qty: 28 TABLET | Refills: 0 | Status: SHIPPED | OUTPATIENT
Start: 2019-08-05 | End: 2019-08-19

## 2019-08-05 RX ORDER — PROMETHAZINE HYDROCHLORIDE 25 MG/1
25 SUPPOSITORY RECTAL 2 TIMES DAILY
Qty: 14 SUPPOSITORY | Refills: 0 | Status: SHIPPED | OUTPATIENT
Start: 2019-08-05 | End: 2019-10-09 | Stop reason: SDUPTHER

## 2019-08-13 DIAGNOSIS — H66.001 ACUTE SUPPURATIVE OTITIS MEDIA OF RIGHT EAR WITHOUT SPONTANEOUS RUPTURE OF TYMPANIC MEMBRANE, RECURRENCE NOT SPECIFIED: ICD-10-CM

## 2019-08-20 DIAGNOSIS — C50.911 HORMONE RECEPTOR POSITIVE BREAST CANCER, RIGHT (HCC): ICD-10-CM

## 2019-08-20 DIAGNOSIS — Z17.0 MALIGNANT NEOPLASM OF LOWER-OUTER QUADRANT OF RIGHT BREAST OF FEMALE, ESTROGEN RECEPTOR POSITIVE (HCC): ICD-10-CM

## 2019-08-20 DIAGNOSIS — C50.511 MALIGNANT NEOPLASM OF LOWER-OUTER QUADRANT OF RIGHT BREAST OF FEMALE, ESTROGEN RECEPTOR POSITIVE (HCC): ICD-10-CM

## 2019-08-20 RX ORDER — OXYCODONE HYDROCHLORIDE AND ACETAMINOPHEN 5; 325 MG/1; MG/1
1 TABLET ORAL EVERY 8 HOURS PRN
Qty: 60 TABLET | Refills: 0 | Status: SHIPPED | OUTPATIENT
Start: 2019-08-20 | End: 2019-10-09 | Stop reason: SDUPTHER

## 2019-09-17 RX ORDER — ATORVASTATIN CALCIUM 10 MG/1
TABLET, FILM COATED ORAL
Qty: 90 TABLET | Refills: 0 | Status: SHIPPED | OUTPATIENT
Start: 2019-09-17 | End: 2020-01-06 | Stop reason: SDUPTHER

## 2019-10-08 DIAGNOSIS — C50.911 HORMONE RECEPTOR POSITIVE BREAST CANCER, RIGHT (HCC): ICD-10-CM

## 2019-10-08 DIAGNOSIS — Z17.0 MALIGNANT NEOPLASM OF UPPER-OUTER QUADRANT OF RIGHT BREAST IN FEMALE, ESTROGEN RECEPTOR POSITIVE (HCC): ICD-10-CM

## 2019-10-08 DIAGNOSIS — Z17.0 MALIGNANT NEOPLASM OF LOWER-OUTER QUADRANT OF RIGHT BREAST OF FEMALE, ESTROGEN RECEPTOR POSITIVE (HCC): ICD-10-CM

## 2019-10-08 DIAGNOSIS — C50.411 MALIGNANT NEOPLASM OF UPPER-OUTER QUADRANT OF RIGHT BREAST IN FEMALE, ESTROGEN RECEPTOR POSITIVE (HCC): ICD-10-CM

## 2019-10-08 DIAGNOSIS — G44.221 CHRONIC TENSION-TYPE HEADACHE, INTRACTABLE: ICD-10-CM

## 2019-10-08 DIAGNOSIS — C50.511 MALIGNANT NEOPLASM OF LOWER-OUTER QUADRANT OF RIGHT BREAST OF FEMALE, ESTROGEN RECEPTOR POSITIVE (HCC): ICD-10-CM

## 2019-10-09 DIAGNOSIS — G25.81 RESTLESS LEG: Primary | ICD-10-CM

## 2019-10-09 RX ORDER — ROPINIROLE 0.25 MG/1
0.25 TABLET, FILM COATED ORAL 3 TIMES DAILY
Qty: 90 TABLET | Refills: 3 | Status: SHIPPED | OUTPATIENT
Start: 2019-10-09

## 2019-10-09 RX ORDER — OXYCODONE HYDROCHLORIDE AND ACETAMINOPHEN 5; 325 MG/1; MG/1
1 TABLET ORAL EVERY 8 HOURS PRN
Qty: 60 TABLET | Refills: 0 | Status: SHIPPED | OUTPATIENT
Start: 2019-10-09 | End: 2019-11-04 | Stop reason: SDUPTHER

## 2019-10-09 RX ORDER — PROMETHAZINE HYDROCHLORIDE 25 MG/1
25 SUPPOSITORY RECTAL 2 TIMES DAILY
Qty: 14 SUPPOSITORY | Refills: 0 | Status: SHIPPED | OUTPATIENT
Start: 2019-10-09 | End: 2019-10-23

## 2019-10-23 ENCOUNTER — TELEPHONE (OUTPATIENT)
Dept: INFUSION THERAPY | Age: 54
End: 2019-10-23

## 2019-11-04 DIAGNOSIS — C50.911 HORMONE RECEPTOR POSITIVE BREAST CANCER, RIGHT (HCC): ICD-10-CM

## 2019-11-04 DIAGNOSIS — C50.511 MALIGNANT NEOPLASM OF LOWER-OUTER QUADRANT OF RIGHT BREAST OF FEMALE, ESTROGEN RECEPTOR POSITIVE (HCC): ICD-10-CM

## 2019-11-04 DIAGNOSIS — Z17.0 MALIGNANT NEOPLASM OF LOWER-OUTER QUADRANT OF RIGHT BREAST OF FEMALE, ESTROGEN RECEPTOR POSITIVE (HCC): ICD-10-CM

## 2019-11-04 RX ORDER — OXYCODONE HYDROCHLORIDE AND ACETAMINOPHEN 5; 325 MG/1; MG/1
1 TABLET ORAL EVERY 8 HOURS PRN
Qty: 60 TABLET | Refills: 0 | Status: SHIPPED | OUTPATIENT
Start: 2019-11-04 | End: 2019-12-05 | Stop reason: SDUPTHER

## 2019-12-05 DIAGNOSIS — C50.511 MALIGNANT NEOPLASM OF LOWER-OUTER QUADRANT OF RIGHT BREAST OF FEMALE, ESTROGEN RECEPTOR POSITIVE (HCC): ICD-10-CM

## 2019-12-05 DIAGNOSIS — H66.001 ACUTE SUPPURATIVE OTITIS MEDIA OF RIGHT EAR WITHOUT SPONTANEOUS RUPTURE OF TYMPANIC MEMBRANE, RECURRENCE NOT SPECIFIED: ICD-10-CM

## 2019-12-05 DIAGNOSIS — Z17.0 MALIGNANT NEOPLASM OF LOWER-OUTER QUADRANT OF RIGHT BREAST OF FEMALE, ESTROGEN RECEPTOR POSITIVE (HCC): ICD-10-CM

## 2019-12-05 DIAGNOSIS — C50.911 HORMONE RECEPTOR POSITIVE BREAST CANCER, RIGHT (HCC): ICD-10-CM

## 2019-12-05 RX ORDER — CIPROFLOXACIN AND DEXAMETHASONE 3; 1 MG/ML; MG/ML
4 SUSPENSION/ DROPS AURICULAR (OTIC) 2 TIMES DAILY
Qty: 7.5 ML | Refills: 0 | Status: SHIPPED | OUTPATIENT
Start: 2019-12-05 | End: 2020-05-06

## 2019-12-05 RX ORDER — OXYCODONE HYDROCHLORIDE AND ACETAMINOPHEN 5; 325 MG/1; MG/1
1 TABLET ORAL EVERY 8 HOURS PRN
Qty: 60 TABLET | Refills: 0 | Status: SHIPPED | OUTPATIENT
Start: 2019-12-05 | End: 2020-01-04

## 2020-01-06 ENCOUNTER — OFFICE VISIT (OUTPATIENT)
Dept: FAMILY MEDICINE CLINIC | Age: 55
End: 2020-01-06
Payer: COMMERCIAL

## 2020-01-06 VITALS
HEART RATE: 82 BPM | SYSTOLIC BLOOD PRESSURE: 128 MMHG | HEIGHT: 63 IN | DIASTOLIC BLOOD PRESSURE: 58 MMHG | OXYGEN SATURATION: 98 % | BODY MASS INDEX: 30.65 KG/M2 | WEIGHT: 173 LBS

## 2020-01-06 PROCEDURE — 99213 OFFICE O/P EST LOW 20 MIN: CPT | Performed by: FAMILY MEDICINE

## 2020-01-06 RX ORDER — ATORVASTATIN CALCIUM 10 MG/1
TABLET, FILM COATED ORAL
Qty: 90 TABLET | Refills: 0 | Status: SHIPPED | OUTPATIENT
Start: 2020-01-06 | End: 2020-04-28 | Stop reason: SDUPTHER

## 2020-01-06 RX ORDER — ALBUTEROL SULFATE 90 UG/1
AEROSOL, METERED RESPIRATORY (INHALATION)
Qty: 8.5 G | Refills: 3 | Status: SHIPPED | OUTPATIENT
Start: 2020-01-06 | End: 2020-10-20 | Stop reason: SDUPTHER

## 2020-01-06 RX ORDER — OXYCODONE HYDROCHLORIDE AND ACETAMINOPHEN 5; 325 MG/1; MG/1
1 TABLET ORAL 2 TIMES DAILY
Qty: 60 TABLET | Refills: 0 | Status: SHIPPED | OUTPATIENT
Start: 2020-01-06 | End: 2020-02-03 | Stop reason: SDUPTHER

## 2020-01-06 RX ORDER — DULOXETIN HYDROCHLORIDE 60 MG/1
60 CAPSULE, DELAYED RELEASE ORAL DAILY
Qty: 30 CAPSULE | Refills: 2 | Status: SHIPPED | OUTPATIENT
Start: 2020-01-06 | End: 2020-03-16

## 2020-01-06 ASSESSMENT — PATIENT HEALTH QUESTIONNAIRE - PHQ9
SUM OF ALL RESPONSES TO PHQ9 QUESTIONS 1 & 2: 0
2. FEELING DOWN, DEPRESSED OR HOPELESS: 0
SUM OF ALL RESPONSES TO PHQ QUESTIONS 1-9: 0
SUM OF ALL RESPONSES TO PHQ QUESTIONS 1-9: 0
1. LITTLE INTEREST OR PLEASURE IN DOING THINGS: 0

## 2020-01-06 NOTE — PROGRESS NOTES
capsule 2    oxyCODONE-acetaminophen (PERCOCET) 5-325 MG per tablet Take 1 tablet by mouth 2 times daily for 30 days. Intended supply: 7 days. Take lowest dose possible to manage pain 60 tablet 0    ALPRAZolam (XANAX) 0.5 MG tablet take 1 tablet by mouth NIGHTLY AS NEEDED FOR SLEEP OR ANXIETY 30 tablet 0    ciprofloxacin-dexamethasone (CIPRODEX) 0.3-0.1 % otic suspension Place 4 drops into both ears 2 times daily 7.5 mL 0    rOPINIRole (REQUIP) 0.25 MG tablet Take 1 tablet by mouth 3 times daily 90 tablet 3    SUMAtriptan (IMITREX) 20 MG/ACT nasal spray 1 spray by Nasal route daily as needed for Migraine 1 Inhaler 3    ADO-trastuzumab emtansine (KADCYLA) 100 MG SOLR chemo injection Infuse intravenously      prochlorperazine (COMPAZINE) 10 MG tablet Take 10 mg by mouth every 6 hours as needed      DULoxetine (CYMBALTA) 30 MG extended release capsule Take 1 capsule by mouth daily 90 capsule 1    rizatriptan (MAXALT) 5 MG tablet Take 1 tablet by mouth once as needed for Migraine May repeat in 2 hours if needed 30 tablet 3    triamcinolone (KENALOG) 0.1 % cream APPLY TO RASH ON BODY TWICE DAILY (NOT FACE, ARMPIT OR GROIN) 80 g 1    furosemide (LASIX) 20 MG tablet Take 1 tablet by mouth daily (Patient taking differently: Take 20 mg by mouth daily as needed ) 10 tablet 0    lidocaine (LMX) 4 % cream Apply thin layer to hands 30 minutes prior to showering 45 g 1    tacrolimus (PROTOPIC) 0.1 % ointment Apply to rash on face twice daily 30 g 2    clobetasol (TEMOVATE) 0.05 % ointment Apply to rash on hands twice daily (not face, armpit or groin) 30 g 2    lidocaine-prilocaine (EMLA) 2.5-2.5 % cream Apply topically Daily as needed. 1 Tube 1    ondansetron (ZOFRAN ODT) 8 MG disintegrating tablet Take 1 tablet by mouth every 8 hours as needed for Nausea or Vomiting 30 tablet 3     No current facility-administered medications for this visit.       ALLERGIES:    Allergies   Allergen Reactions    Dye [Iodides] 0 0 0     Interpretation of Total Score Depression Severity: 1-4 = Minimal depression, 5-9 = Mild depression, 10-14 = Moderate depression, 15-19 = Moderately severe depression, 20-27 = Severe depression     Electronically signed by Taiwo Moscoso MD on 1/6/2020 at 8:24 AM       (Please note that portions of this note were completed with a voice recognition program. Efforts were made to edit the dictations but occasionally words are mis-transcribed.)

## 2020-02-03 ENCOUNTER — TELEPHONE (OUTPATIENT)
Dept: FAMILY MEDICINE CLINIC | Age: 55
End: 2020-02-03

## 2020-02-03 RX ORDER — OXYCODONE HYDROCHLORIDE AND ACETAMINOPHEN 5; 325 MG/1; MG/1
1 TABLET ORAL 2 TIMES DAILY
Qty: 60 TABLET | Refills: 0 | Status: SHIPPED | OUTPATIENT
Start: 2020-02-03 | End: 2020-03-03 | Stop reason: SDUPTHER

## 2020-03-03 RX ORDER — OXYCODONE HYDROCHLORIDE AND ACETAMINOPHEN 5; 325 MG/1; MG/1
1 TABLET ORAL 2 TIMES DAILY
Qty: 60 TABLET | Refills: 0 | Status: SHIPPED | OUTPATIENT
Start: 2020-03-03 | End: 2020-04-08 | Stop reason: SDUPTHER

## 2020-03-09 RX ORDER — DULOXETIN HYDROCHLORIDE 30 MG/1
CAPSULE, DELAYED RELEASE ORAL
Qty: 30 CAPSULE | OUTPATIENT
Start: 2020-03-09

## 2020-03-16 RX ORDER — DULOXETIN HYDROCHLORIDE 60 MG/1
60 CAPSULE, DELAYED RELEASE ORAL DAILY
Qty: 30 CAPSULE | Refills: 2 | Status: SHIPPED | OUTPATIENT
Start: 2020-03-16 | End: 2020-07-08

## 2020-03-16 RX ORDER — DULOXETIN HYDROCHLORIDE 30 MG/1
CAPSULE, DELAYED RELEASE ORAL
Qty: 30 CAPSULE | OUTPATIENT
Start: 2020-03-16

## 2020-03-18 RX ORDER — DULOXETIN HYDROCHLORIDE 30 MG/1
30 CAPSULE, DELAYED RELEASE ORAL DAILY
Qty: 90 CAPSULE | Refills: 1 | Status: SHIPPED | OUTPATIENT
Start: 2020-03-18 | End: 2020-09-09

## 2020-03-18 NOTE — TELEPHONE ENCOUNTER
Betzy Kuo is calling to request a refill on the following medication(s):    Last Visit Date (If Applicable):  6/4/2580    Next Visit Date:    Visit date not found    Medication Request:  Requested Prescriptions     Pending Prescriptions Disp Refills    DULoxetine (CYMBALTA) 30 MG extended release capsule 90 capsule 1     Sig: Take 1 capsule by mouth daily

## 2020-03-27 ENCOUNTER — PATIENT MESSAGE (OUTPATIENT)
Dept: FAMILY MEDICINE CLINIC | Age: 55
End: 2020-03-27

## 2020-03-30 RX ORDER — TRIAMCINOLONE ACETONIDE 0.1 %
PASTE (GRAM) DENTAL
Qty: 5 G | Refills: 1 | Status: SHIPPED | OUTPATIENT
Start: 2020-03-30

## 2020-03-30 RX ORDER — HYDROCODONE BITARTRATE AND ACETAMINOPHEN 10; 325 MG/1; MG/1
1 TABLET ORAL 2 TIMES DAILY
Qty: 14 TABLET | Refills: 0 | Status: SHIPPED | OUTPATIENT
Start: 2020-03-30 | End: 2020-04-06

## 2020-03-30 NOTE — TELEPHONE ENCOUNTER
From: Sukhdev Cheney  To: Rick Nicolas MD  Sent: 3/27/2020 12:31 PM EDT  Subject: Prescription Question    Hi Dr. Eriberto Weiss:    I broke a tooth that had a root canal, so the tooth doesn't hurt, but my tongue and check is cut up. My Dentist says it is not an emergency, and that they are only seeing true emergency patients at this time. So I have been taking 2 1/2 to 3 pain meds seeing I am working from home for my mouth issue along with the full numbness in my right arm do to how I am working on my computer. My normal script is not due for a refill till next Friday. Would it be possible to write a different one for 3 times a day until I can get back to normal.    I also need a refill on my Ciprodex Otic as I am wearing a phone headset for 5 hours a day seeing I am working from home.     My cell is [phone]    Thank you,    Marissa

## 2020-04-27 ENCOUNTER — PATIENT MESSAGE (OUTPATIENT)
Dept: FAMILY MEDICINE CLINIC | Age: 55
End: 2020-04-27

## 2020-04-28 RX ORDER — ATORVASTATIN CALCIUM 10 MG/1
TABLET, FILM COATED ORAL
Qty: 90 TABLET | Refills: 0 | Status: SHIPPED | OUTPATIENT
Start: 2020-04-28 | End: 2020-07-21

## 2020-04-28 RX ORDER — OXYCODONE HYDROCHLORIDE AND ACETAMINOPHEN 5; 325 MG/1; MG/1
1 TABLET ORAL 2 TIMES DAILY
Qty: 60 TABLET | Refills: 0 | Status: SHIPPED | OUTPATIENT
Start: 2020-04-28 | End: 2020-06-04 | Stop reason: SDUPTHER

## 2020-04-28 RX ORDER — HYDROCODONE BITARTRATE AND ACETAMINOPHEN 7.5; 325 MG/1; MG/1
1 TABLET ORAL 2 TIMES DAILY
Qty: 60 TABLET | Refills: 0 | OUTPATIENT
Start: 2020-04-28 | End: 2020-05-28

## 2020-04-28 NOTE — TELEPHONE ENCOUNTER
Geoff Chand is calling to request a refill on the following medication(s):    Last Visit Date (If Applicable):  4/7/1095    Next Visit Date:    Visit date not found    Medication Request:  Requested Prescriptions     Pending Prescriptions Disp Refills    atorvastatin (LIPITOR) 10 MG tablet 90 tablet 0     Sig: TAKE 1 TABLET BY MOUTH ONE TIME A DAY

## 2020-06-04 RX ORDER — OXYCODONE HYDROCHLORIDE AND ACETAMINOPHEN 5; 325 MG/1; MG/1
1 TABLET ORAL 2 TIMES DAILY
Qty: 60 TABLET | Refills: 0 | Status: SHIPPED | OUTPATIENT
Start: 2020-06-04 | End: 2020-07-01 | Stop reason: SDUPTHER

## 2020-06-17 ENCOUNTER — E-VISIT (OUTPATIENT)
Dept: FAMILY MEDICINE CLINIC | Age: 55
End: 2020-06-17

## 2020-06-17 ENCOUNTER — TELEPHONE (OUTPATIENT)
Dept: FAMILY MEDICINE CLINIC | Age: 55
End: 2020-06-17

## 2020-06-17 ENCOUNTER — TELEMEDICINE (OUTPATIENT)
Dept: FAMILY MEDICINE CLINIC | Age: 55
End: 2020-06-17
Payer: COMMERCIAL

## 2020-06-17 PROCEDURE — 99213 OFFICE O/P EST LOW 20 MIN: CPT | Performed by: FAMILY MEDICINE

## 2020-06-17 ASSESSMENT — PATIENT HEALTH QUESTIONNAIRE - PHQ9
SUM OF ALL RESPONSES TO PHQ9 QUESTIONS 1 & 2: 1
1. LITTLE INTEREST OR PLEASURE IN DOING THINGS: 0
SUM OF ALL RESPONSES TO PHQ QUESTIONS 1-9: 1
2. FEELING DOWN, DEPRESSED OR HOPELESS: 1
SUM OF ALL RESPONSES TO PHQ QUESTIONS 1-9: 1

## 2020-07-01 ENCOUNTER — PATIENT MESSAGE (OUTPATIENT)
Dept: FAMILY MEDICINE CLINIC | Age: 55
End: 2020-07-01

## 2020-07-01 RX ORDER — OXYCODONE HYDROCHLORIDE AND ACETAMINOPHEN 5; 325 MG/1; MG/1
1 TABLET ORAL 2 TIMES DAILY
Qty: 60 TABLET | Refills: 0 | Status: SHIPPED | OUTPATIENT
Start: 2020-07-01 | End: 2020-07-28 | Stop reason: ALTCHOICE

## 2020-07-02 NOTE — TELEPHONE ENCOUNTER
From: Oscar Rosenberg  To: Janace Epley, MD  Sent: 7/1/2020 4:38 PM EDT  Subject: Prescription Question    I will be able to fill it on July 7th. Thank you,  Marissa      ----- Message -----   Ozzie Bustillo MD   Sent:7/1/2020 4:26 PM EDT   To:Ruma Pickard   Subject:RE: Prescription Question    I called in the script. Please let me know if you were able to fill it. Thank you.      ----- Message -----   From:Ruma Pickard   Sent:7/1/2020 11:09 AM EDT   Horacio Jackson MD   Subject:Prescription Question    Good Morning:    I have one 5mg -325 Oxycodone left. If you call in a refill for my current script, I will not be able to get it filled until the 7th. This last month has not been the best with the nephropathy, the pharmacy in [facility] did get [place] to approve the compound cream, so hopefully that will help. I did get your voicemail regarding the pain management doctor, which I will contact after I see Dr Kasey Perez on the 9th for my CT results. I had the CT done yesterday at [place] [id number]. So if possible, I don't know if you would change the script so it is a new one, so I could  before the 7th. My cell is [phone].   Thank you, Marissa

## 2020-07-08 RX ORDER — DULOXETIN HYDROCHLORIDE 60 MG/1
60 CAPSULE, DELAYED RELEASE ORAL DAILY
Qty: 30 CAPSULE | Refills: 2 | Status: SHIPPED | OUTPATIENT
Start: 2020-07-08 | End: 2020-10-13

## 2020-07-21 RX ORDER — ATORVASTATIN CALCIUM 10 MG/1
TABLET, FILM COATED ORAL
Qty: 90 TABLET | Refills: 0 | Status: SHIPPED | OUTPATIENT
Start: 2020-07-21 | End: 2020-07-27 | Stop reason: SDUPTHER

## 2020-07-26 ENCOUNTER — PATIENT MESSAGE (OUTPATIENT)
Dept: FAMILY MEDICINE CLINIC | Age: 55
End: 2020-07-26

## 2020-07-28 RX ORDER — OXYCODONE AND ACETAMINOPHEN 7.5; 325 MG/1; MG/1
1 TABLET ORAL 2 TIMES DAILY
Qty: 60 TABLET | Refills: 0 | Status: SHIPPED | OUTPATIENT
Start: 2020-07-28 | End: 2020-08-25 | Stop reason: SDUPTHER

## 2020-07-28 RX ORDER — ATORVASTATIN CALCIUM 10 MG/1
TABLET, FILM COATED ORAL
Qty: 90 TABLET | Refills: 0 | Status: SHIPPED | OUTPATIENT
Start: 2020-07-28 | End: 2021-02-10

## 2020-07-28 NOTE — TELEPHONE ENCOUNTER
SHAREE.  I called the patient this morning discussed with her that I will reach out to the nurse practitioner Steff Perez to ask actually what her plan is. I will increase the patient's Percocet to 7.5 mg twice a day. The patient has increased nerve damage due to chemotherapy and she has increased pain in her feet especially her right arm.

## 2020-07-28 NOTE — TELEPHONE ENCOUNTER
Derek Deleon is calling to request a refill on the following medication(s):    Last Visit Date (If Applicable):  0/32/91    Next Visit Date:    Visit date not found    Medication Request:  Requested Prescriptions     Pending Prescriptions Disp Refills    atorvastatin (LIPITOR) 10 MG tablet 90 tablet 0     Sig: take 1 tablet by mouth once daily

## 2020-08-24 ENCOUNTER — PATIENT MESSAGE (OUTPATIENT)
Dept: FAMILY MEDICINE CLINIC | Age: 55
End: 2020-08-24

## 2020-08-25 RX ORDER — OXYCODONE AND ACETAMINOPHEN 7.5; 325 MG/1; MG/1
1 TABLET ORAL 2 TIMES DAILY
Qty: 60 TABLET | Refills: 0 | Status: SHIPPED | OUTPATIENT
Start: 2020-08-25 | End: 2020-09-24 | Stop reason: SDUPTHER

## 2020-09-03 NOTE — TELEPHONE ENCOUNTER
Elisabeth Shetty is calling to request a refill on the following medication(s):    Last Visit Date (If Applicable):  7/39/8499    Next Visit Date:    9/8/2020    Medication Request:  Requested Prescriptions     Pending Prescriptions Disp Refills    CIPRODEX 0.3-0.1 % otic suspension [Pharmacy Med Name: [email or web address] 7.5 mL 0     Sig: instill 4 drops into each ear twice a day

## 2020-09-08 ENCOUNTER — OFFICE VISIT (OUTPATIENT)
Dept: FAMILY MEDICINE CLINIC | Age: 55
End: 2020-09-08
Payer: COMMERCIAL

## 2020-09-08 VITALS
TEMPERATURE: 97.7 F | DIASTOLIC BLOOD PRESSURE: 86 MMHG | WEIGHT: 182.6 LBS | BODY MASS INDEX: 32.35 KG/M2 | SYSTOLIC BLOOD PRESSURE: 139 MMHG | OXYGEN SATURATION: 94 % | HEART RATE: 97 BPM

## 2020-09-08 PROCEDURE — 99213 OFFICE O/P EST LOW 20 MIN: CPT | Performed by: FAMILY MEDICINE

## 2020-09-08 PROCEDURE — 17110 DESTRUCTION B9 LES UP TO 14: CPT | Performed by: FAMILY MEDICINE

## 2020-09-08 RX ORDER — ZOSTER VACCINE RECOMBINANT, ADJUVANTED 50 MCG/0.5
0.5 KIT INTRAMUSCULAR SEE ADMIN INSTRUCTIONS
Qty: 0.5 ML | Refills: 1 | Status: SHIPPED | OUTPATIENT
Start: 2020-09-08 | End: 2021-03-07

## 2020-09-08 SDOH — ECONOMIC STABILITY: INCOME INSECURITY: HOW HARD IS IT FOR YOU TO PAY FOR THE VERY BASICS LIKE FOOD, HOUSING, MEDICAL CARE, AND HEATING?: NOT HARD AT ALL

## 2020-09-08 SDOH — ECONOMIC STABILITY: FOOD INSECURITY: WITHIN THE PAST 12 MONTHS, YOU WORRIED THAT YOUR FOOD WOULD RUN OUT BEFORE YOU GOT MONEY TO BUY MORE.: NEVER TRUE

## 2020-09-08 SDOH — ECONOMIC STABILITY: FOOD INSECURITY: WITHIN THE PAST 12 MONTHS, THE FOOD YOU BOUGHT JUST DIDN'T LAST AND YOU DIDN'T HAVE MONEY TO GET MORE.: NEVER TRUE

## 2020-09-08 ASSESSMENT — PATIENT HEALTH QUESTIONNAIRE - PHQ9
1. LITTLE INTEREST OR PLEASURE IN DOING THINGS: 0
SUM OF ALL RESPONSES TO PHQ9 QUESTIONS 1 & 2: 1
2. FEELING DOWN, DEPRESSED OR HOPELESS: 1
SUM OF ALL RESPONSES TO PHQ QUESTIONS 1-9: 1
SUM OF ALL RESPONSES TO PHQ QUESTIONS 1-9: 1

## 2020-09-08 NOTE — PROGRESS NOTES
oxyCODONE-acetaminophen (PERCOCET) 7.5-325 MG per tablet Take 1 tablet by mouth 2 times daily for 30 days. Intended supply: 30 days 60 tablet 0    atorvastatin (LIPITOR) 10 MG tablet take 1 tablet by mouth once daily 90 tablet 0    DULoxetine (CYMBALTA) 60 MG extended release capsule take 1 capsule by mouth daily 30 capsule 2    fluticasone (FLONASE) 50 MCG/ACT nasal spray 2 sprays by Each Nostril route daily 1 Bottle 5    triamcinolone acetonide (KENALOG) 0.1 % paste Apply to teeth 2 times daily. 5 g 1    DULoxetine (CYMBALTA) 30 MG extended release capsule Take 1 capsule by mouth daily 90 capsule 1    albuterol sulfate HFA (PROAIR HFA) 108 (90 Base) MCG/ACT inhaler inhale 2 puffs by mouth four times a day if needed 8.5 g 3    rOPINIRole (REQUIP) 0.25 MG tablet Take 1 tablet by mouth 3 times daily 90 tablet 3    SUMAtriptan (IMITREX) 20 MG/ACT nasal spray 1 spray by Nasal route daily as needed for Migraine 1 Inhaler 3    ADO-trastuzumab emtansine (KADCYLA) 100 MG SOLR chemo injection Infuse intravenously      prochlorperazine (COMPAZINE) 10 MG tablet Take 10 mg by mouth every 6 hours as needed      triamcinolone (KENALOG) 0.1 % cream APPLY TO RASH ON BODY TWICE DAILY (NOT FACE, ARMPIT OR GROIN) 80 g 1    furosemide (LASIX) 20 MG tablet Take 1 tablet by mouth daily (Patient taking differently: Take 20 mg by mouth daily as needed ) 10 tablet 0    lidocaine (LMX) 4 % cream Apply thin layer to hands 30 minutes prior to showering 45 g 1    tacrolimus (PROTOPIC) 0.1 % ointment Apply to rash on face twice daily 30 g 2    clobetasol (TEMOVATE) 0.05 % ointment Apply to rash on hands twice daily (not face, armpit or groin) 30 g 2    lidocaine-prilocaine (EMLA) 2.5-2.5 % cream Apply topically Daily as needed.  1 Tube 1    ondansetron (ZOFRAN ODT) 8 MG disintegrating tablet Take 1 tablet by mouth every 8 hours as needed for Nausea or Vomiting 30 tablet 3    rizatriptan (MAXALT) 5 MG tablet Take 1 tablet by mouth once as needed for Migraine May repeat in 2 hours if needed 30 tablet 3     No current facility-administered medications for this visit. ALLERGIES:    Allergies   Allergen Reactions    Dye [Iodides] Anaphylaxis    Eggs Or Egg-Derived Products Anaphylaxis    Shellfish-Derived Products Anaphylaxis, Shortness Of Breath and Swelling       Social History     Tobacco Use    Smoking status: Current Every Day Smoker     Packs/day: 1.00     Years: 40.00     Pack years: 40.00     Types: Cigarettes    Smokeless tobacco: Never Used   Substance Use Topics    Alcohol use: No     Alcohol/week: 0.0 standard drinks     Comment: social      Body mass index is 32.35 kg/m². /86   Pulse 97   Temp 97.7 °F (36.5 °C)   Wt 182 lb 9.6 oz (82.8 kg)   SpO2 94%   BMI 32.35 kg/m²     Subjective:      HPI    62 yo female coming in today for follow-up. She tells me that she stopped gabapentin and she has been feeling much better without it. States that the fatigue the insomnia is improved. Patient also feels that the increased pain medication Percocet 7.5 mg 2 times a day has helped her nerve pain. The patient is status post metastatic breast cancer right July 2018. She did start having neuropathic pains and feet after chemotherapy. Patient also tells me that her dog has been helping her quite a bit. She also would like to discuss a plantar wart which she has been having at her right foot now for some time. She states certain movements certain pressure in shoes acute severe pain there. She has tried over-the-counter topicals which did not help. She also is going to get her flu vaccination and shingles vaccination at her pharmacy. Review of Systems   Constitutional: Negative for fever and unexpected weight change. Respiratory: Negative for cough and shortness of breath. Cardiovascular: Negative for chest pain and leg swelling.    Gastrointestinal: Negative for diarrhea, constipation and blood in and repeat in 2-6 months     Dispense:  0.5 mL     Refill:  1       Ruma received counseling on the following healthy behaviors: nutrition, exercise and medication adherence  Reviewed prior labs and health maintenance. Continue current medications, diet and exercise. Discussed use, benefit, and side effects of prescribed medications. Barriers to medication compliance addressed. Patient given educational materials - see patient instructions. All patient questions answered. Patient voiced understanding.       Electronically signed by Ubaldo Mendez MD on 9/8/2020 at 9:43 AM       (Please note that portions of this note were completed with a voice recognition program. Efforts were made to edit the dictations but occasionally words are mis-transcribed.)

## 2020-09-09 RX ORDER — DULOXETIN HYDROCHLORIDE 30 MG/1
CAPSULE, DELAYED RELEASE ORAL
Qty: 90 CAPSULE | Refills: 1 | Status: SHIPPED | OUTPATIENT
Start: 2020-09-09 | End: 2021-03-08

## 2020-09-09 NOTE — TELEPHONE ENCOUNTER
Stefanie Eason is calling to request a refill on the following medication(s):    Last Visit Date (If Applicable):  2/0/8841    Next Visit Date:    Visit date not found    Medication Request:  Requested Prescriptions     Pending Prescriptions Disp Refills    DULoxetine (CYMBALTA) 30 MG extended release capsule [Pharmacy Med Name: DULOXETINE HCL DR 30 MG CAP] 90 capsule 1     Sig: take 1 capsule by mouth once daily

## 2020-09-24 ENCOUNTER — PATIENT MESSAGE (OUTPATIENT)
Dept: FAMILY MEDICINE CLINIC | Age: 55
End: 2020-09-24

## 2020-09-24 RX ORDER — OXYCODONE AND ACETAMINOPHEN 7.5; 325 MG/1; MG/1
1 TABLET ORAL 2 TIMES DAILY
Qty: 60 TABLET | Refills: 0 | Status: SHIPPED | OUTPATIENT
Start: 2020-09-24 | End: 2020-10-20 | Stop reason: SDUPTHER

## 2020-09-24 RX ORDER — SUMATRIPTAN 20 MG/1
1 SPRAY NASAL DAILY PRN
Qty: 1 INHALER | Refills: 3 | Status: SHIPPED | OUTPATIENT
Start: 2020-09-24

## 2020-09-24 RX ORDER — SUMATRIPTAN 100 MG/1
100 TABLET, FILM COATED ORAL
Qty: 9 TABLET | Refills: 3 | Status: SHIPPED | OUTPATIENT
Start: 2020-09-24 | End: 2020-09-24

## 2020-09-24 RX ORDER — PROMETHAZINE HYDROCHLORIDE 25 MG/1
25 SUPPOSITORY RECTAL 2 TIMES DAILY
Qty: 28 SUPPOSITORY | Refills: 1 | Status: SHIPPED | OUTPATIENT
Start: 2020-09-24 | End: 2020-10-08

## 2020-09-24 NOTE — TELEPHONE ENCOUNTER
From: Ramirez Ruvalcaba  To: Genevieve More MD  Sent: 9/24/2020 11:42 AM EDT  Subject: Prescription Question    Hi:    I requested refills on the pain meds and the Sumatriptan. I don't think [facility] will let me get the pain meds till Monday, I am not sure if there is anything you can do have them ready today. The stress, sinus/neuropathy headache is in full blown. I still have 3 Phenadoz suppository, I have used one the last 3 nights before bed. I am not running a fever, Ann Dyer is afraid I have Covid, which I do not have any other symptoms except headache. My cell is [phone].   Marissa

## 2020-09-24 NOTE — TELEPHONE ENCOUNTER
Oscar Rosenberg is calling to request a refill on the following medication(s):    Last Visit Date (If Applicable):  9884    Next Visit Date:    Visit date not found    Medication Request:  Requested Prescriptions     Pending Prescriptions Disp Refills    SUMAtriptan (IMITREX) 20 MG/ACT nasal spray 1 Inhaler 3     Si spray by Nasal route daily as needed for Migraine    oxyCODONE-acetaminophen (PERCOCET) 7.5-325 MG per tablet 60 tablet 0     Sig: Take 1 tablet by mouth 2 times daily for 30 days.  Intended supply: 30 days

## 2020-10-13 RX ORDER — DULOXETIN HYDROCHLORIDE 60 MG/1
60 CAPSULE, DELAYED RELEASE ORAL DAILY
Qty: 30 CAPSULE | Refills: 2 | Status: SHIPPED | OUTPATIENT
Start: 2020-10-13 | End: 2021-01-11

## 2020-10-13 NOTE — TELEPHONE ENCOUNTER
Ariana Odonnell is calling to request a refill on the following medication(s):    Last Visit Date (If Applicable):  4/5/4988    Next Visit Date:    Visit date not found    Medication Request:  Requested Prescriptions     Pending Prescriptions Disp Refills    DULoxetine (CYMBALTA) 60 MG extended release capsule [Pharmacy Med Name: DULOXETINE HCL DR 60 MG CAP] 30 capsule 2     Sig: take 1 capsule by mouth daily

## 2020-10-20 RX ORDER — ALBUTEROL SULFATE 90 UG/1
AEROSOL, METERED RESPIRATORY (INHALATION)
Qty: 8.5 G | Refills: 3 | Status: SHIPPED | OUTPATIENT
Start: 2020-10-20 | End: 2021-06-21 | Stop reason: SDUPTHER

## 2020-10-20 RX ORDER — OXYCODONE AND ACETAMINOPHEN 7.5; 325 MG/1; MG/1
1 TABLET ORAL 2 TIMES DAILY
Qty: 60 TABLET | Refills: 0 | Status: SHIPPED | OUTPATIENT
Start: 2020-10-20 | End: 2020-11-12 | Stop reason: SDUPTHER

## 2020-10-20 NOTE — TELEPHONE ENCOUNTER
Aloma Soulier is calling to request a refill on the following medication(s):    Last Visit Date (If Applicable):  2/1/8273    Next Visit Date:    Visit date not found    Medication Request:  Requested Prescriptions     Pending Prescriptions Disp Refills    albuterol sulfate HFA (PROAIR HFA) 108 (90 Base) MCG/ACT inhaler 8.5 g 3     Sig: inhale 2 puffs by mouth four times a day if needed    oxyCODONE-acetaminophen (PERCOCET) 7.5-325 MG per tablet 60 tablet 0     Sig: Take 1 tablet by mouth 2 times daily for 30 days.  Intended supply: 30 days

## 2020-11-11 ENCOUNTER — PATIENT MESSAGE (OUTPATIENT)
Dept: FAMILY MEDICINE CLINIC | Age: 55
End: 2020-11-11

## 2020-11-12 RX ORDER — OXYCODONE AND ACETAMINOPHEN 7.5; 325 MG/1; MG/1
1 TABLET ORAL 2 TIMES DAILY
Qty: 60 TABLET | Refills: 0 | Status: SHIPPED | OUTPATIENT
Start: 2020-11-12 | End: 2020-12-10 | Stop reason: SDUPTHER

## 2020-11-14 ENCOUNTER — PATIENT MESSAGE (OUTPATIENT)
Dept: FAMILY MEDICINE CLINIC | Age: 55
End: 2020-11-14

## 2020-11-16 RX ORDER — HYDROCODONE BITARTRATE AND ACETAMINOPHEN 10; 325 MG/1; MG/1
1 TABLET ORAL 2 TIMES DAILY
Qty: 10 TABLET | Refills: 0 | Status: SHIPPED | OUTPATIENT
Start: 2020-11-16 | End: 2020-11-21

## 2020-11-16 NOTE — TELEPHONE ENCOUNTER
From: Efrain Liu  To: Marleni Quinteros MD  Sent: 11/14/2020 1:50 PM EST  Subject: Non-Urgent Medical Question    Hi. [facility] says I can't  my script till Thursday. I have 2 left so I didn't know if could contact [facility] if possible? If you can let me know one way or the other. Thank you and I hope you're having a nice weekend. Marissa      ----- Message -----   Ashwini Taylor MD   Sent:11/12/2020 12:38 PM EST   To:Cynthia Cristi Lesches   Subject:RE: Non-Urgent Medical Question    Please see me back at your scheduled appointment on December 3. I will call in a refill today. Thank you. Hope you are doing well.      ----- Message -----   From:Cynthia Cristi Lesches   Sent:11/11/2020 8:10 PM EST   Pablo Connell MD   Subject:Non-Urgent Medical Question    Hi. I called to get a virtual visit for my 3 month Med check. I'll need a refill next Friday you didn't have an opening until Dec 3. Should I call in the morning for a sick call in person appt ? Let me know what you want me to do.    Thank you   Marissa

## 2020-12-08 ENCOUNTER — PATIENT MESSAGE (OUTPATIENT)
Dept: FAMILY MEDICINE CLINIC | Age: 55
End: 2020-12-08

## 2020-12-08 RX ORDER — HYDROCODONE BITARTRATE AND ACETAMINOPHEN 10; 325 MG/1; MG/1
1 TABLET ORAL NIGHTLY
Qty: 30 TABLET | Refills: 0 | Status: SHIPPED | OUTPATIENT
Start: 2020-12-08 | End: 2021-01-06 | Stop reason: SDUPTHER

## 2020-12-08 NOTE — TELEPHONE ENCOUNTER
From: Yee Monroy  To: Tonya Benavides MD  Sent: 12/8/2020 11:23 AM EST  Subject: Prescription Question    Hi. I tried to schedule an E visit and it will not let me. Your next office fist opening is mid January. Since the onset of the cold weather the neuropathy has gotten worse. Please having to use my arms 6-8 hours a day working is not helping anything. I also have dry screen/rash on my back and hip area. I have used over the counter lotions even acrufer with no relief. Should I call in the morning to see if I can get a sick call visit? Can you also send a refill in for the Hydrocodone 10mg which I am taking at nights when my feet are the worse.   Thank you,  Marissa

## 2020-12-10 ENCOUNTER — OFFICE VISIT (OUTPATIENT)
Dept: FAMILY MEDICINE CLINIC | Age: 55
End: 2020-12-10
Payer: COMMERCIAL

## 2020-12-10 VITALS
WEIGHT: 184.2 LBS | DIASTOLIC BLOOD PRESSURE: 88 MMHG | OXYGEN SATURATION: 95 % | HEART RATE: 100 BPM | BODY MASS INDEX: 32.63 KG/M2 | TEMPERATURE: 97.4 F | SYSTOLIC BLOOD PRESSURE: 136 MMHG

## 2020-12-10 PROCEDURE — 99213 OFFICE O/P EST LOW 20 MIN: CPT | Performed by: FAMILY MEDICINE

## 2020-12-10 RX ORDER — HYDROXYZINE 50 MG/1
50 TABLET, FILM COATED ORAL EVERY 8 HOURS PRN
Qty: 30 TABLET | Refills: 0 | Status: SHIPPED | OUTPATIENT
Start: 2020-12-10 | End: 2020-12-20

## 2020-12-10 RX ORDER — ANASTROZOLE 1 MG/1
TABLET ORAL
COMMUNITY
Start: 2019-12-13

## 2020-12-10 RX ORDER — TRIAMCINOLONE ACETONIDE 1 MG/G
CREAM TOPICAL
Qty: 80 G | Refills: 1 | Status: SHIPPED | OUTPATIENT
Start: 2020-12-10

## 2020-12-10 RX ORDER — OXYCODONE AND ACETAMINOPHEN 7.5; 325 MG/1; MG/1
1 TABLET ORAL 2 TIMES DAILY
Qty: 60 TABLET | Refills: 0 | Status: SHIPPED | OUTPATIENT
Start: 2020-12-16 | End: 2021-01-14 | Stop reason: SDUPTHER

## 2020-12-10 RX ORDER — VALACYCLOVIR HYDROCHLORIDE 1 G/1
1000 TABLET, FILM COATED ORAL 2 TIMES DAILY
Qty: 20 TABLET | Refills: 3 | Status: SHIPPED | OUTPATIENT
Start: 2020-12-10

## 2020-12-10 ASSESSMENT — PATIENT HEALTH QUESTIONNAIRE - PHQ9
SUM OF ALL RESPONSES TO PHQ QUESTIONS 1-9: 0
SUM OF ALL RESPONSES TO PHQ QUESTIONS 1-9: 0
SUM OF ALL RESPONSES TO PHQ9 QUESTIONS 1 & 2: 0
SUM OF ALL RESPONSES TO PHQ QUESTIONS 1-9: 0
2. FEELING DOWN, DEPRESSED OR HOPELESS: 0
1. LITTLE INTEREST OR PLEASURE IN DOING THINGS: 0

## 2020-12-10 NOTE — PROGRESS NOTES
General FM note    Tanika Zelaya is a 63 y.o. female who presents today for follow up on her  medical conditions as noted below.   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, estrogen receptor positive ([address]     Chemotherapy induced diarrhea     Chemotherapy-induced acral erythema     Past Medical History:   Diagnosis Date    Anxiety     Asthma     Depression     Headache     Hyperlipidemia     Hypertension     Malignant neoplasm of upper-outer quadrant of breast in female, estrogen receptor positive ([address] 8/7/2018      Past Surgical History:   Procedure Laterality Date    BLADDER SURGERY  2000    BREAST SURGERY      cyst removed left breast    PARTIAL HYSTERECTOMY      [place] INSJ PRPH CTR VAD W/SUBQ PORT AGE 5 YR/> Left 8/13/2018    PORT INSERTION WITH US AND FLUORO performed by Cristine Kaur MD at [address] [place] [address] Drive SALPINGO-OOPHORECTOMY Left     ectopic pregnancy treated by Dr. Felipe Pendleton TUNNELED VENOUS PORT PLACEMENT Left 08/13/2018    chemo port left chest     Family History   Problem Relation Age of Onset    Arthritis Mother         rheumatoid    Cancer Father         lung cancer    Other Father         circulatory issues    Breast Cancer Paternal Grandmother     Cancer Sister         skin cancer    Colon Cancer Other      Current Outpatient Medications   Medication Sig Dispense Refill    anastrozole (ARIMIDEX) 1 MG tablet       hydrOXYzine (ATARAX) 50 MG tablet Take 1 tablet by mouth every 8 hours as needed for Itching 30 tablet 0    triamcinolone (KENALOG) 0.1 % cream APPLY TO RASH ON BODY TWICE DAILY (NOT FACE, ARMPIT OR GROIN) 80 g 1    valACYclovir (VALTREX) 1 g tablet Take 1 tablet by mouth 2 times daily 20 tablet 3    [START ON 12/16/2020] oxyCODONE-acetaminophen (PERCOCET) 7.5-325 MG per tablet Take 1 tablet by mouth 2 times daily for 30 days. Intended supply: 30 days 60 tablet 0    HYDROcodone-acetaminophen (NORCO)  MG per tablet Take 1 tablet by mouth nightly for 30 days. Intended supply: 30 days 30 tablet 0    albuterol sulfate HFA (PROAIR HFA) 108 (90 Base) MCG/ACT inhaler inhale 2 puffs by mouth four times a day if needed 8.5 g 3    DULoxetine (CYMBALTA) 60 MG extended release capsule take 1 capsule by mouth daily 30 capsule 2    DULoxetine (CYMBALTA) 30 MG extended release capsule take 1 capsule by mouth once daily 90 capsule 1    atorvastatin (LIPITOR) 10 MG tablet take 1 tablet by mouth once daily 90 tablet 0    fluticasone (FLONASE) 50 MCG/ACT nasal spray 2 sprays by Each Nostril route daily 1 Bottle 5    SUMAtriptan (IMITREX) 20 MG/ACT nasal spray 1 spray by Nasal route daily as needed for Migraine (Patient not taking: Reported on 12/10/2020) 1 Inhaler 3    SUMAtriptan (IMITREX) 100 MG tablet Take 1 tablet by mouth once as needed for Migraine 9 tablet 3    zoster recombinant adjuvanted vaccine (SHINGRIX) 50 MCG/0.5ML SUSR injection Inject 0.5 mLs into the muscle See Admin Instructions 1 dose now and repeat in 2-6 months 0.5 mL 1    triamcinolone acetonide (KENALOG) 0.1 % paste Apply to teeth 2 times daily.  (Patient not taking: Reported on 12/10/2020) 5 g 1    rOPINIRole (REQUIP) 0.25 MG tablet Take 1 tablet by mouth 3 times daily (Patient not taking: Reported on 12/10/2020) 90 tablet 3    prochlorperazine (COMPAZINE) 10 MG tablet Take 10 mg by mouth every 6 hours as needed      rizatriptan (MAXALT) 5 MG tablet Take 1 tablet by mouth once as needed for Migraine May repeat in 2 hours if needed 30 tablet 3    furosemide (LASIX) 20 MG tablet Take 1 tablet by mouth daily (Patient not taking: Reported on 12/10/2020) 10 tablet 0    lidocaine (LMX) 4 % cream Apply thin layer to hands 30 minutes prior to showering (Patient not taking: Reported on 12/10/2020) 45 g 1    tacrolimus (PROTOPIC) 0.1 % ointment Apply to rash on face twice daily (Patient not taking: Reported on 12/10/2020) 30 g 2    lidocaine-prilocaine (EMLA) 2.5-2.5 % cream Apply topically Daily as needed. 1 Tube 1     No current facility-administered medications for this visit. ALLERGIES:    Allergies   Allergen Reactions    Dye [Iodides] Anaphylaxis    Eggs Or Egg-Derived Products Anaphylaxis    Shellfish-Derived Products Anaphylaxis, Shortness Of Breath and Swelling       Social History     Tobacco Use    Smoking status: Current Every Day Smoker     Packs/day: 1.00     Years: 40.00     Pack years: 40.00     Types: Cigarettes    Smokeless tobacco: Never Used   Substance Use Topics    Alcohol use: No     Alcohol/week: 0.0 standard drinks     Comment: social      Body mass index is 32.63 kg/m². /88   Pulse 100   Temp 97.4 °F (36.3 °C)   Wt 184 lb 3.2 oz (83.6 kg)   SpO2 95%   BMI 32.63 kg/m²     Subjective:      HPI    Very pleasant 65-year-old female coming today with concerns about her rash at her left inguinal area but also burning sensation just at her pubic area. This is the patient is status post right breast removal with lymph node ectomy radiation and chemotherapy because of a metastatic ductal carcinoma right in July 2018. The patient tells me that she had this rash at her left inguinal area now for multiple weeks. She has been using over-the-counter lotions Benadryl which does not help with this rash but also especially during the night she has increased pruritus. At her pubic area she feels that 2 little spots which she does not burn quite a bit at times very tender. This she had also for some time/weeks now. The patient also called recently for a additional pain medication because she states that her muscles or tendons are so tight that she had the mastectomy.   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 She feels that everything is just so tight after the radiation of the chemotherapy it is just so severe that she is in tears. Patient has been wearing her compression stocking because as well she has lymphedema. 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 Patient also tells me that her  has been working 11 hours but then when he comes home he is more so his old self he expects everything done at his house dinner having all his clothes done and the patient is working and is taking care of the household as well. She tells me that she has a wonderful dog and the dog helps her throughout the day especially to this COVID-19 pandemic where she cannot get together even with her family grandkids this dog is quite a lot of support. And she would not like to miss it anymore. Review of Systems   Constitutional: Negative for fever and unexpected weight change. Pertinent items are noted in HPI. Objective:   Physical Exam  Constitutional: VS (see above). General appearance: normal development, habitus and attention, no deformities. No distress. Eyes: normal conjunctiva and lids. CAV: RRR, no RMG. No edema lower extremities. Pulmo: CTA bilateral, no CWR. Skin: no rashes, lesions or ulcers. Physical Exam  Musculoskeletal:        Legs:        Musculoskeletal: normal gait. Nails: no clubbing or cyanosis. Psychiatric: alert and oriented to place, time and person. Normal mood and affect. Assessment:       Diagnosis Orders   1. Other pruritus  hydrOXYzine (ATARAX) 50 MG tablet   2. Dry skin dermatitis  triamcinolone (KENALOG) 0.1 % cream   3. Herpes zoster with complication  valACYclovir (VALTREX) 1 g tablet   4. Chemotherapy-induced acral erythema  oxyCODONE-acetaminophen (PERCOCET) 7.5-325 MG per tablet   5.  Malignant neoplasm of upper-outer quadrant of right breast in female, estrogen receptor positive ([facility]  oxyCODONE-acetaminophen (PERCOCET) 7.5-325 MG per tablet       Plan:   Patient will start the medication as called in. Again the patient patient has a lot of pain discomfort after the surgery and radiation to chemotherapy and I will continue to give her pain medication as she needs it. The patient overall feels somewhat depressed. I discussed with her even to make an appointment with me every 2 weeks just for follow-up to discuss her mental state. The patient feels at times she is left alone by her  and family. She cannot talk to anybody about her health state. So hopefully she will reach out to me. No follow-ups on file. No orders of the defined types were placed in this encounter. Orders Placed This Encounter   Medications    hydrOXYzine (ATARAX) 50 MG tablet     Sig: Take 1 tablet by mouth every 8 hours as needed for Itching     Dispense:  30 tablet     Refill:  0    triamcinolone (KENALOG) 0.1 % cream     Sig: APPLY TO RASH ON BODY TWICE DAILY (NOT FACE, ARMPIT OR GROIN)     Dispense:  80 g     Refill:  1    valACYclovir (VALTREX) 1 g tablet     Sig: Take 1 tablet by mouth 2 times daily     Dispense:  20 tablet     Refill:  3    oxyCODONE-acetaminophen (PERCOCET) 7.5-325 MG per tablet     Sig: Take 1 tablet by mouth 2 times daily for 30 days. Intended supply: 30 days     Dispense:  60 tablet     Refill:  0     Reduce doses taken as pain becomes manageable       Call or return to clinic prn if these symptoms worsen or fail to improve as anticipated. I have reviewed the instructions with the patient, answering all questions to her satisfaction. Jaqueline Eli received counseling on the following healthy behaviors: nutrition, exercise and medication adherence  Reviewed prior labs and health maintenance. Continue current medications, diet and exercise. Discussed use, benefit, and side effects of prescribed medications. Barriers to medication compliance addressed. Patient given educational materials - see patient instructions. All patient questions answered. Patient voiced understanding.       Electronically signed by Marleni Quinteros MD on 12/11/2020 at 6:53 AM       (Please note that portions of this note were completed with a voice recognition program. Efforts were made to edit the dictations but occasionally words are mis-transcribed.)

## 2020-12-21 ENCOUNTER — PATIENT MESSAGE (OUTPATIENT)
Dept: FAMILY MEDICINE CLINIC | Age: 55
End: 2020-12-21

## 2020-12-29 RX ORDER — CIPROFLOXACIN AND DEXAMETHASONE 3; 1 MG/ML; MG/ML
SUSPENSION/ DROPS AURICULAR (OTIC)
Qty: 7.5 ML | Refills: 0 | Status: SHIPPED | OUTPATIENT
Start: 2020-12-29 | End: 2021-06-08 | Stop reason: SDUPTHER

## 2021-01-09 DIAGNOSIS — C50.511 MALIGNANT NEOPLASM OF LOWER-OUTER QUADRANT OF RIGHT BREAST OF FEMALE, ESTROGEN RECEPTOR POSITIVE (HCC): ICD-10-CM

## 2021-01-09 DIAGNOSIS — Z17.0 MALIGNANT NEOPLASM OF LOWER-OUTER QUADRANT OF RIGHT BREAST OF FEMALE, ESTROGEN RECEPTOR POSITIVE (HCC): ICD-10-CM

## 2021-01-09 DIAGNOSIS — G44.221 CHRONIC TENSION-TYPE HEADACHE, INTRACTABLE: ICD-10-CM

## 2021-01-11 RX ORDER — DULOXETIN HYDROCHLORIDE 60 MG/1
60 CAPSULE, DELAYED RELEASE ORAL DAILY
Qty: 30 CAPSULE | Refills: 2 | Status: SHIPPED | OUTPATIENT
Start: 2021-01-11 | End: 2021-01-18

## 2021-01-11 NOTE — TELEPHONE ENCOUNTER
Renaldo Jefferson is calling to request a refill on the following medication(s):    Last Visit Date (If Applicable):  94/31/8603    Next Visit Date:    Visit date not found    Medication Request:  Requested Prescriptions     Pending Prescriptions Disp Refills    DULoxetine (CYMBALTA) 60 MG extended release capsule [Pharmacy Med Name: DULOXETINE HCL DR 60 MG CAP] 30 capsule 2     Sig: take 1 capsule by mouth daily

## 2021-01-14 DIAGNOSIS — Z17.0 MALIGNANT NEOPLASM OF UPPER-OUTER QUADRANT OF RIGHT BREAST IN FEMALE, ESTROGEN RECEPTOR POSITIVE (HCC): ICD-10-CM

## 2021-01-14 DIAGNOSIS — C50.411 MALIGNANT NEOPLASM OF UPPER-OUTER QUADRANT OF RIGHT BREAST IN FEMALE, ESTROGEN RECEPTOR POSITIVE (HCC): ICD-10-CM

## 2021-01-14 DIAGNOSIS — L27.1 CHEMOTHERAPY-INDUCED ACRAL ERYTHEMA: ICD-10-CM

## 2021-01-14 RX ORDER — OXYCODONE AND ACETAMINOPHEN 7.5; 325 MG/1; MG/1
1 TABLET ORAL 2 TIMES DAILY
Qty: 60 TABLET | Refills: 0 | Status: SHIPPED | OUTPATIENT
Start: 2021-01-14 | End: 2021-02-13

## 2021-01-14 NOTE — TELEPHONE ENCOUNTER
Leandro Rey is calling to request a refill on the following medication(s):    Last Visit Date (If Applicable):  57/84/8891    Next Visit Date:    Visit date not found    Medication Request:  Requested Prescriptions     Pending Prescriptions Disp Refills    oxyCODONE-acetaminophen (PERCOCET) 7.5-325 MG per tablet 60 tablet 0     Sig: Take 1 tablet by mouth 2 times daily for 30 days.  Intended supply: 30 days

## 2021-01-17 DIAGNOSIS — G44.221 CHRONIC TENSION-TYPE HEADACHE, INTRACTABLE: ICD-10-CM

## 2021-01-17 DIAGNOSIS — Z17.0 MALIGNANT NEOPLASM OF LOWER-OUTER QUADRANT OF RIGHT BREAST OF FEMALE, ESTROGEN RECEPTOR POSITIVE (HCC): ICD-10-CM

## 2021-01-17 DIAGNOSIS — C50.511 MALIGNANT NEOPLASM OF LOWER-OUTER QUADRANT OF RIGHT BREAST OF FEMALE, ESTROGEN RECEPTOR POSITIVE (HCC): ICD-10-CM

## 2021-01-18 RX ORDER — DULOXETIN HYDROCHLORIDE 60 MG/1
60 CAPSULE, DELAYED RELEASE ORAL DAILY
Qty: 30 CAPSULE | Refills: 2 | Status: SHIPPED | OUTPATIENT
Start: 2021-01-18 | End: 2021-07-06

## 2021-02-05 DIAGNOSIS — C50.411 MALIGNANT NEOPLASM OF UPPER-OUTER QUADRANT OF RIGHT BREAST IN FEMALE, ESTROGEN RECEPTOR POSITIVE (HCC): ICD-10-CM

## 2021-02-05 DIAGNOSIS — L27.1 CHEMOTHERAPY-INDUCED ACRAL ERYTHEMA: ICD-10-CM

## 2021-02-05 DIAGNOSIS — G62.0 CHEMOTHERAPY-INDUCED PERIPHERAL NEUROPATHY (HCC): ICD-10-CM

## 2021-02-05 DIAGNOSIS — T45.1X5A CHEMOTHERAPY-INDUCED PERIPHERAL NEUROPATHY (HCC): ICD-10-CM

## 2021-02-05 DIAGNOSIS — Z17.0 MALIGNANT NEOPLASM OF UPPER-OUTER QUADRANT OF RIGHT BREAST IN FEMALE, ESTROGEN RECEPTOR POSITIVE (HCC): ICD-10-CM

## 2021-02-05 RX ORDER — HYDROCODONE BITARTRATE AND ACETAMINOPHEN 10; 325 MG/1; MG/1
1 TABLET ORAL NIGHTLY
Qty: 30 TABLET | Refills: 0 | Status: SHIPPED | OUTPATIENT
Start: 2021-02-05 | End: 2021-03-05 | Stop reason: SDUPTHER

## 2021-02-05 NOTE — TELEPHONE ENCOUNTER
Renaldo Jefferson is calling to request a refill on the following medication(s):    Last Visit Date (If Applicable):  86/76/0662    Next Visit Date:    Visit date not found    Medication Request:  Requested Prescriptions     Pending Prescriptions Disp Refills    HYDROcodone-acetaminophen (NORCO)  MG per tablet 30 tablet 0     Sig: Take 1 tablet by mouth nightly for 30 days.  Intended supply: 30 days

## 2021-02-10 RX ORDER — ATORVASTATIN CALCIUM 10 MG/1
TABLET, FILM COATED ORAL
Qty: 90 TABLET | Refills: 0 | Status: SHIPPED | OUTPATIENT
Start: 2021-02-10 | End: 2021-05-24

## 2021-02-15 ENCOUNTER — PATIENT MESSAGE (OUTPATIENT)
Dept: FAMILY MEDICINE CLINIC | Age: 56
End: 2021-02-15

## 2021-02-15 DIAGNOSIS — G62.0 CHEMOTHERAPY-INDUCED PERIPHERAL NEUROPATHY (HCC): ICD-10-CM

## 2021-02-15 DIAGNOSIS — C50.411 MALIGNANT NEOPLASM OF UPPER-OUTER QUADRANT OF RIGHT BREAST IN FEMALE, ESTROGEN RECEPTOR POSITIVE (HCC): Primary | ICD-10-CM

## 2021-02-15 DIAGNOSIS — T45.1X5A CHEMOTHERAPY-INDUCED PERIPHERAL NEUROPATHY (HCC): ICD-10-CM

## 2021-02-15 DIAGNOSIS — L27.1 CHEMOTHERAPY-INDUCED ACRAL ERYTHEMA: ICD-10-CM

## 2021-02-15 DIAGNOSIS — Z17.0 MALIGNANT NEOPLASM OF UPPER-OUTER QUADRANT OF RIGHT BREAST IN FEMALE, ESTROGEN RECEPTOR POSITIVE (HCC): Primary | ICD-10-CM

## 2021-02-15 RX ORDER — OXYCODONE AND ACETAMINOPHEN 7.5; 325 MG/1; MG/1
1 TABLET ORAL 2 TIMES DAILY
Qty: 60 TABLET | Refills: 0 | Status: SHIPPED | OUTPATIENT
Start: 2021-02-15 | End: 2021-03-15 | Stop reason: SDUPTHER

## 2021-02-15 NOTE — TELEPHONE ENCOUNTER
From: Sakshi Bradford  To: Adeline Grider MD  Sent: 2/15/2021 2:48 PM EST  Subject: Prescription Question    Hi:    I do not see the 7.5 pain med's in my prescription list to request a refill. It may be time for a follow up appointment with you, I am not sure. Can you send in a refill, and let me know if I should schedule an in person appointment or if a virtual appointment is okay.     Thank you, Marissa

## 2021-03-05 DIAGNOSIS — L27.1 CHEMOTHERAPY-INDUCED ACRAL ERYTHEMA: ICD-10-CM

## 2021-03-05 DIAGNOSIS — G62.0 CHEMOTHERAPY-INDUCED PERIPHERAL NEUROPATHY (HCC): ICD-10-CM

## 2021-03-05 DIAGNOSIS — Z17.0 MALIGNANT NEOPLASM OF UPPER-OUTER QUADRANT OF RIGHT BREAST IN FEMALE, ESTROGEN RECEPTOR POSITIVE (HCC): ICD-10-CM

## 2021-03-05 DIAGNOSIS — T45.1X5A CHEMOTHERAPY-INDUCED PERIPHERAL NEUROPATHY (HCC): ICD-10-CM

## 2021-03-05 DIAGNOSIS — C50.411 MALIGNANT NEOPLASM OF UPPER-OUTER QUADRANT OF RIGHT BREAST IN FEMALE, ESTROGEN RECEPTOR POSITIVE (HCC): ICD-10-CM

## 2021-03-05 RX ORDER — HYDROCODONE BITARTRATE AND ACETAMINOPHEN 10; 325 MG/1; MG/1
1 TABLET ORAL NIGHTLY
Qty: 30 TABLET | Refills: 0 | Status: SHIPPED | OUTPATIENT
Start: 2021-03-05 | End: 2021-04-04

## 2021-03-08 DIAGNOSIS — Z17.0 MALIGNANT NEOPLASM OF LOWER-OUTER QUADRANT OF RIGHT BREAST OF FEMALE, ESTROGEN RECEPTOR POSITIVE (HCC): ICD-10-CM

## 2021-03-08 DIAGNOSIS — C50.511 MALIGNANT NEOPLASM OF LOWER-OUTER QUADRANT OF RIGHT BREAST OF FEMALE, ESTROGEN RECEPTOR POSITIVE (HCC): ICD-10-CM

## 2021-03-08 DIAGNOSIS — G44.221 CHRONIC TENSION-TYPE HEADACHE, INTRACTABLE: ICD-10-CM

## 2021-03-08 RX ORDER — DULOXETIN HYDROCHLORIDE 30 MG/1
CAPSULE, DELAYED RELEASE ORAL
Qty: 90 CAPSULE | Refills: 1 | Status: SHIPPED | OUTPATIENT
Start: 2021-03-08 | End: 2021-03-15 | Stop reason: SDUPTHER

## 2021-03-15 ENCOUNTER — PATIENT MESSAGE (OUTPATIENT)
Dept: FAMILY MEDICINE CLINIC | Age: 56
End: 2021-03-15

## 2021-03-15 DIAGNOSIS — T45.1X5A CHEMOTHERAPY-INDUCED PERIPHERAL NEUROPATHY (HCC): ICD-10-CM

## 2021-03-15 DIAGNOSIS — G62.0 CHEMOTHERAPY-INDUCED PERIPHERAL NEUROPATHY (HCC): ICD-10-CM

## 2021-03-15 DIAGNOSIS — C50.511 MALIGNANT NEOPLASM OF LOWER-OUTER QUADRANT OF RIGHT BREAST OF FEMALE, ESTROGEN RECEPTOR POSITIVE (HCC): ICD-10-CM

## 2021-03-15 DIAGNOSIS — C50.411 MALIGNANT NEOPLASM OF UPPER-OUTER QUADRANT OF RIGHT BREAST IN FEMALE, ESTROGEN RECEPTOR POSITIVE (HCC): ICD-10-CM

## 2021-03-15 DIAGNOSIS — L27.1 CHEMOTHERAPY-INDUCED ACRAL ERYTHEMA: ICD-10-CM

## 2021-03-15 DIAGNOSIS — Z17.0 MALIGNANT NEOPLASM OF UPPER-OUTER QUADRANT OF RIGHT BREAST IN FEMALE, ESTROGEN RECEPTOR POSITIVE (HCC): ICD-10-CM

## 2021-03-15 DIAGNOSIS — G44.221 CHRONIC TENSION-TYPE HEADACHE, INTRACTABLE: ICD-10-CM

## 2021-03-15 DIAGNOSIS — Z17.0 MALIGNANT NEOPLASM OF LOWER-OUTER QUADRANT OF RIGHT BREAST OF FEMALE, ESTROGEN RECEPTOR POSITIVE (HCC): ICD-10-CM

## 2021-03-15 RX ORDER — OXYCODONE AND ACETAMINOPHEN 7.5; 325 MG/1; MG/1
1 TABLET ORAL 2 TIMES DAILY
Qty: 60 TABLET | Refills: 0 | Status: SHIPPED | OUTPATIENT
Start: 2021-03-15 | End: 2021-04-12 | Stop reason: SDUPTHER

## 2021-03-15 RX ORDER — DULOXETIN HYDROCHLORIDE 30 MG/1
30 CAPSULE, DELAYED RELEASE ORAL DAILY
Qty: 90 CAPSULE | Refills: 1 | Status: SHIPPED | OUTPATIENT
Start: 2021-03-15 | End: 2021-10-04

## 2021-03-15 NOTE — TELEPHONE ENCOUNTER
From: Cornelia Bhatia  To: Forest Hassan MD  Sent: 3/15/2021 9:08 AM EDT  Subject: Non-Urgent Medical Question    Good Morning:  I just wanted to let you know I received my first Moderna vaccine on Friday. I have had zero side effects which makes me super happy. I will see you on the 23rd.   Have a nice day,  Marissa

## 2021-03-23 ENCOUNTER — OFFICE VISIT (OUTPATIENT)
Dept: FAMILY MEDICINE CLINIC | Age: 56
End: 2021-03-23
Payer: COMMERCIAL

## 2021-03-23 VITALS
OXYGEN SATURATION: 94 % | SYSTOLIC BLOOD PRESSURE: 134 MMHG | HEIGHT: 63 IN | TEMPERATURE: 97 F | DIASTOLIC BLOOD PRESSURE: 88 MMHG | WEIGHT: 189 LBS | BODY MASS INDEX: 33.49 KG/M2 | HEART RATE: 96 BPM

## 2021-03-23 DIAGNOSIS — Z17.0 MALIGNANT NEOPLASM OF UPPER-OUTER QUADRANT OF RIGHT BREAST IN FEMALE, ESTROGEN RECEPTOR POSITIVE (HCC): Primary | ICD-10-CM

## 2021-03-23 DIAGNOSIS — T45.1X5A CHEMOTHERAPY INDUCED DIARRHEA: ICD-10-CM

## 2021-03-23 DIAGNOSIS — K52.1 CHEMOTHERAPY INDUCED DIARRHEA: ICD-10-CM

## 2021-03-23 DIAGNOSIS — B07.0 PLANTAR WART OF RIGHT FOOT: ICD-10-CM

## 2021-03-23 DIAGNOSIS — C50.411 MALIGNANT NEOPLASM OF UPPER-OUTER QUADRANT OF RIGHT BREAST IN FEMALE, ESTROGEN RECEPTOR POSITIVE (HCC): Primary | ICD-10-CM

## 2021-03-23 DIAGNOSIS — L27.1 CHEMOTHERAPY-INDUCED ACRAL ERYTHEMA: ICD-10-CM

## 2021-03-23 PROCEDURE — 99214 OFFICE O/P EST MOD 30 MIN: CPT | Performed by: FAMILY MEDICINE

## 2021-03-23 RX ORDER — OXYCODONE AND ACETAMINOPHEN 10; 325 MG/1; MG/1
1 TABLET ORAL DAILY
Qty: 30 TABLET | Refills: 0 | Status: SHIPPED | OUTPATIENT
Start: 2021-03-23 | End: 2021-04-20 | Stop reason: SDUPTHER

## 2021-03-23 ASSESSMENT — PATIENT HEALTH QUESTIONNAIRE - PHQ9
2. FEELING DOWN, DEPRESSED OR HOPELESS: 0
SUM OF ALL RESPONSES TO PHQ QUESTIONS 1-9: 0
SUM OF ALL RESPONSES TO PHQ QUESTIONS 1-9: 0

## 2021-03-23 NOTE — PROGRESS NOTES
General FM note    Eloisa Gatica is a 61 y.o. female who presents today for follow up on her  medical conditions as noted below. Eloisa Gatica is c/o of   Chief Complaint   Patient presents with    Medication Check       Patient Active Problem List:     H/O severe sun exposure     Chronic tension-type headache, intractable     Essential hypertension     Microscopic hematuria     Lower abdominal pain     Malignant neoplasm of upper-outer quadrant of breast in female, estrogen receptor positive ([address]     Chemotherapy induced diarrhea     Chemotherapy-induced acral erythema     Past Medical History:   Diagnosis Date    Anxiety     Asthma     Depression     Headache     Hyperlipidemia     Hypertension     Malignant neoplasm of upper-outer quadrant of breast in female, estrogen receptor positive ([address] 8/7/2018      Past Surgical History:   Procedure Laterality Date    BLADDER SURGERY  2000    BREAST SURGERY      cyst removed left breast    PARTIAL HYSTERECTOMY      [place] INSJ PRPH CTR VAD W/SUBQ PORT AGE 5 YR/> Left 8/13/2018    PORT INSERTION WITH US AND FLUORO performed by Melchor Bruce MD at [address] [place] [address] Drive SALPINGO-OOPHORECTOMY Left     ectopic pregnancy treated by Dr. Alvaro Tyson TUNNELED VENOUS PORT PLACEMENT Left 08/13/2018    chemo port left chest     Family History   Problem Relation Age of Onset    Arthritis Mother         rheumatoid    Cancer Father         lung cancer    Other Father         circulatory issues    Breast Cancer Paternal Grandmother     Cancer Sister         skin cancer    Colon Cancer Other      Current Outpatient Medications   Medication Sig Dispense Refill    oxyCODONE-acetaminophen (PERCOCET)  MG per tablet Take 1 tablet by mouth daily for 30 days.  Intended supply: 30 days 30 tablet 0    DULoxetine (CYMBALTA) 30 MG extended release capsule Take 1 capsule by mouth daily 90 capsule 1    oxyCODONE-acetaminophen (PERCOCET) 7.5-325 MG per tablet Take taking: Reported on 12/10/2020) 10 tablet 0    lidocaine (LMX) 4 % cream Apply thin layer to hands 30 minutes prior to showering (Patient not taking: Reported on 12/10/2020) 45 g 1    tacrolimus (PROTOPIC) 0.1 % ointment Apply to rash on face twice daily (Patient not taking: Reported on 12/10/2020) 30 g 2    lidocaine-prilocaine (EMLA) 2.5-2.5 % cream Apply topically Daily as needed. (Patient not taking: Reported on 3/23/2021) 1 Tube 1     No current facility-administered medications for this visit. ALLERGIES:    Allergies   Allergen Reactions    Dye [Iodides] Anaphylaxis    Eggs Or Egg-Derived Products Anaphylaxis    Shellfish-Derived Products Anaphylaxis, Shortness Of Breath and Swelling       Social History     Tobacco Use    Smoking status: Current Every Day Smoker     Packs/day: 1.00     Years: 40.00     Pack years: 40.00     Types: Cigarettes    Smokeless tobacco: Never Used   Substance Use Topics    Alcohol use: No     Alcohol/week: 0.0 standard drinks     Comment: social      Body mass index is 33.48 kg/m². /88   Pulse 96   Temp 97 °F (36.1 °C)   Ht 5' 3\" (1.6 m)   Wt 189 lb (85.7 kg)   SpO2 94%   BMI 33.48 kg/m²     Subjective:      HPI    60 yo female coming today for follow-up. The patient was status post metastatic ductal carcinoma right breast diagnosed in July 2018. Status post chemotherapy with severe side effects including chemotherapy-induced neuropathy. Patient tells me that she has 2 part-time jobs now. She works from home. She has her right arm on the mouse throughout the day. And the right arm is that she has also lymphedema. She states that she has stress at work and she tells me that especially in the morning she has a lot a lot of pains. She has been using the Percocet 7.5 mg morning and night in addition the Vicodin 12 today. But she feels that in the morning the pain is unbearable.   She feels that this could be because she was resting the whole night and she just has to get back to moving but then again the patient has a history of severe neuropathy which we tried to treat with gabapentin which made her very sleepy patient was treated with and is treated with Cymbalta. The patient did get her first COVID-19 vaccine. The second 1 is scheduled. Review of Systems   Constitutional: Negative for fever and unexpected weight change. Pertinent items are noted in HPI. Objective:   Physical Exam  Constitutional: VS (see above). General appearance: normal development, habitus and attention, no deformities. No distress. Eyes: normal conjunctiva and lids. CAV: RRR, no RMG. No edema lower extremities. Pulmo: CTA bilateral, no CWR. Skin: no rashes, lesions or ulcers. Musculoskeletal: normal gait. Nails: no clubbing or cyanosis. Right arm compression sleeve present. Psychiatric: alert and oriented to place, time and person. Normal mood and affect. Assessment:       Diagnosis Orders   1. Malignant neoplasm of upper-outer quadrant of right breast in female, estrogen receptor positive ([facility] [address]  oxyCODONE-acetaminophen (PERCOCET)  MG per tablet   2. Chemotherapy induced diarrhea  oxyCODONE-acetaminophen (PERCOCET)  MG per tablet   3. Chemotherapy-induced acral erythema  oxyCODONE-acetaminophen (PERCOCET)  MG per tablet    External Referral To Podiatry   4. Plantar wart of right foot  External Referral To Podiatry       Plan:   I discussed with patient I will call in the Percocet 10 mg in a.m. Patient will try it and she will let me know if this helps or not. Again this patient has severe nerve damage after even discussing with the pain specialist that there is nothing he can do not even a nerve block or any injection I will continue to treat this patient with a high dose of narcotics. No follow-ups on file.   Orders Placed This Encounter   Procedures    External Referral To Podiatry     Referral Priority:   Routine     Referral Type: Eval and Treat     Referral Reason:   Specialty Services Required     Referred to Provider:   Kaia Tom DPM     Requested Specialty:   Podiatry     Number of Visits Requested:   1     Orders Placed This Encounter   Medications    oxyCODONE-acetaminophen (PERCOCET)  MG per tablet     Sig: Take 1 tablet by mouth daily for 30 days. Intended supply: 30 days     Dispense:  30 tablet     Refill:  0     Reduce doses taken as pain becomes manageable       Call or return to clinic prn if these symptoms worsen or fail to improve as anticipated. I have reviewed the instructions with the patient, answering all questions to her satisfaction. Hayden Stevens received counseling on the following healthy behaviors: nutrition, exercise and medication adherence  Reviewed prior labs and health maintenance. Continue current medications, diet and exercise. Discussed use, benefit, and side effects of prescribed medications. Barriers to medication compliance addressed. Patient given educational materials - see patient instructions. All patient questions answered. Patient voiced understanding.       Electronically signed by Marion Sherwood MD on 3/23/2021 at 8:45 AM       (Please note that portions of this note were completed with a voice recognition program. Efforts were made to edit the dictations but occasionally words are mis-transcribed.)

## 2021-04-12 ENCOUNTER — PATIENT MESSAGE (OUTPATIENT)
Dept: FAMILY MEDICINE CLINIC | Age: 56
End: 2021-04-12

## 2021-04-12 DIAGNOSIS — L27.1 CHEMOTHERAPY-INDUCED ACRAL ERYTHEMA: ICD-10-CM

## 2021-04-12 DIAGNOSIS — Z17.0 MALIGNANT NEOPLASM OF UPPER-OUTER QUADRANT OF RIGHT BREAST IN FEMALE, ESTROGEN RECEPTOR POSITIVE (HCC): ICD-10-CM

## 2021-04-12 DIAGNOSIS — T45.1X5A CHEMOTHERAPY-INDUCED PERIPHERAL NEUROPATHY (HCC): ICD-10-CM

## 2021-04-12 DIAGNOSIS — G62.0 CHEMOTHERAPY-INDUCED PERIPHERAL NEUROPATHY (HCC): ICD-10-CM

## 2021-04-12 DIAGNOSIS — Z12.31 ENCOUNTER FOR SCREENING MAMMOGRAM FOR MALIGNANT NEOPLASM OF BREAST: Primary | ICD-10-CM

## 2021-04-12 DIAGNOSIS — C50.411 MALIGNANT NEOPLASM OF UPPER-OUTER QUADRANT OF RIGHT BREAST IN FEMALE, ESTROGEN RECEPTOR POSITIVE (HCC): ICD-10-CM

## 2021-04-12 RX ORDER — OXYCODONE AND ACETAMINOPHEN 7.5; 325 MG/1; MG/1
1 TABLET ORAL 2 TIMES DAILY
Qty: 60 TABLET | Refills: 0 | Status: SHIPPED | OUTPATIENT
Start: 2021-04-12 | End: 2021-05-10 | Stop reason: SDUPTHER

## 2021-04-12 NOTE — TELEPHONE ENCOUNTER
From: Jack Peña  To: Michael Akins MD  Sent: 4/12/2021 1:57 PM EDT  Subject: Non-Urgent Medical Question    Hi:  I'm attaching a copy of my Covid-19 Vaccine Record, as I would like this added to my chart. I did not have any side affects with the 2nd dose. I did not go see the foot doctor in [facility], I tried my parag again, and I am keeping mu fingers crossed he got it this time. I think we found a good combination with the pain med's. I take a 10mg when I get up which is between 4:30-5am. Then I take a 7.5 around 1:00 and then another 7.5 before bed which is between 9:00-10:00.     Thank you,  Marissa

## 2021-04-13 NOTE — TELEPHONE ENCOUNTER
Please call Dr. Teressa Álvarez office and updated the mammogram status at the patient's chart. Thank you.

## 2021-04-20 ENCOUNTER — PATIENT MESSAGE (OUTPATIENT)
Dept: FAMILY MEDICINE CLINIC | Age: 56
End: 2021-04-20

## 2021-04-20 DIAGNOSIS — K52.1 CHEMOTHERAPY INDUCED DIARRHEA: ICD-10-CM

## 2021-04-20 DIAGNOSIS — Z17.0 MALIGNANT NEOPLASM OF UPPER-OUTER QUADRANT OF RIGHT BREAST IN FEMALE, ESTROGEN RECEPTOR POSITIVE (HCC): ICD-10-CM

## 2021-04-20 DIAGNOSIS — C50.411 MALIGNANT NEOPLASM OF UPPER-OUTER QUADRANT OF RIGHT BREAST IN FEMALE, ESTROGEN RECEPTOR POSITIVE (HCC): ICD-10-CM

## 2021-04-20 DIAGNOSIS — T45.1X5A CHEMOTHERAPY INDUCED DIARRHEA: ICD-10-CM

## 2021-04-20 DIAGNOSIS — L27.1 CHEMOTHERAPY-INDUCED ACRAL ERYTHEMA: ICD-10-CM

## 2021-04-20 RX ORDER — OXYCODONE AND ACETAMINOPHEN 10; 325 MG/1; MG/1
1 TABLET ORAL DAILY
Qty: 30 TABLET | Refills: 0 | Status: SHIPPED | OUTPATIENT
Start: 2021-04-20 | End: 2021-05-20 | Stop reason: SDUPTHER

## 2021-04-20 RX ORDER — POLYMYXIN B SULFATE AND TRIMETHOPRIM 1; 10000 MG/ML; [USP'U]/ML
1 SOLUTION OPHTHALMIC EVERY 6 HOURS
Qty: 1 BOTTLE | Refills: 2 | Status: SHIPPED | OUTPATIENT
Start: 2021-04-20 | End: 2021-04-30

## 2021-04-20 NOTE — TELEPHONE ENCOUNTER
From: Lisette Aragon  To: Milagro Coto MD  Sent: 4/20/2021 1:24 PM EDT  Subject: Non-Urgent Medical Question    Hi:  I think I have [place]. Do you need to see me or can you call in some drops?   Thank you,  Marissa

## 2021-05-10 ENCOUNTER — PATIENT MESSAGE (OUTPATIENT)
Dept: FAMILY MEDICINE CLINIC | Age: 56
End: 2021-05-10

## 2021-05-10 DIAGNOSIS — L27.1 CHEMOTHERAPY-INDUCED ACRAL ERYTHEMA: ICD-10-CM

## 2021-05-10 DIAGNOSIS — Z17.0 MALIGNANT NEOPLASM OF UPPER-OUTER QUADRANT OF RIGHT BREAST IN FEMALE, ESTROGEN RECEPTOR POSITIVE (HCC): ICD-10-CM

## 2021-05-10 DIAGNOSIS — T45.1X5A CHEMOTHERAPY-INDUCED PERIPHERAL NEUROPATHY (HCC): ICD-10-CM

## 2021-05-10 DIAGNOSIS — C50.411 MALIGNANT NEOPLASM OF UPPER-OUTER QUADRANT OF RIGHT BREAST IN FEMALE, ESTROGEN RECEPTOR POSITIVE (HCC): ICD-10-CM

## 2021-05-10 DIAGNOSIS — G62.0 CHEMOTHERAPY-INDUCED PERIPHERAL NEUROPATHY (HCC): ICD-10-CM

## 2021-05-10 RX ORDER — OXYCODONE AND ACETAMINOPHEN 7.5; 325 MG/1; MG/1
1 TABLET ORAL 2 TIMES DAILY
Qty: 60 TABLET | Refills: 0 | Status: SHIPPED | OUTPATIENT
Start: 2021-05-10 | End: 2021-06-08 | Stop reason: SDUPTHER

## 2021-05-10 NOTE — TELEPHONE ENCOUNTER
From: Shad Willis  To: Radames Sesay MD  Sent: 5/10/2021 10:03 AM EDT  Subject: Non-Urgent Medical Question    Hi, just keeping you in the loop. I saw Dr. Denisse Noguera for my 6 month check up. I asked her if I could go back to an office setting. She said yes 2 weeks after the last person received their final vaccine. There are 2 co-workers that will not get the vaccine, nor do they wear masks in the office, so I will continue to work from home.     Marissa

## 2021-05-10 NOTE — TELEPHONE ENCOUNTER
Jacki Ayers is calling to request a refill on the following medication(s):    Last Visit Date (If Applicable):  0/68/1514    Next Visit Date:    Visit date not found    Medication Request:  Requested Prescriptions     Pending Prescriptions Disp Refills    oxyCODONE-acetaminophen (PERCOCET) 7.5-325 MG per tablet 60 tablet 0     Sig: Take 1 tablet by mouth 2 times daily for 30 days.  Intended supply: 30 days

## 2021-05-20 DIAGNOSIS — T45.1X5A CHEMOTHERAPY INDUCED DIARRHEA: ICD-10-CM

## 2021-05-20 DIAGNOSIS — K52.1 CHEMOTHERAPY INDUCED DIARRHEA: ICD-10-CM

## 2021-05-20 DIAGNOSIS — L27.1 CHEMOTHERAPY-INDUCED ACRAL ERYTHEMA: ICD-10-CM

## 2021-05-20 DIAGNOSIS — C50.411 MALIGNANT NEOPLASM OF UPPER-OUTER QUADRANT OF RIGHT BREAST IN FEMALE, ESTROGEN RECEPTOR POSITIVE (HCC): ICD-10-CM

## 2021-05-20 DIAGNOSIS — Z17.0 MALIGNANT NEOPLASM OF UPPER-OUTER QUADRANT OF RIGHT BREAST IN FEMALE, ESTROGEN RECEPTOR POSITIVE (HCC): ICD-10-CM

## 2021-05-21 RX ORDER — OXYCODONE AND ACETAMINOPHEN 10; 325 MG/1; MG/1
1 TABLET ORAL DAILY
Qty: 30 TABLET | Refills: 0 | Status: SHIPPED | OUTPATIENT
Start: 2021-05-21 | End: 2021-06-20

## 2021-05-21 NOTE — TELEPHONE ENCOUNTER
Pollo Gambino is calling to request a refill on the following medication(s):    Last Visit Date (If Applicable):  3/10/4173    Next Visit Date:    Visit date not found    Medication Request:  Requested Prescriptions     Pending Prescriptions Disp Refills    oxyCODONE-acetaminophen (PERCOCET)  MG per tablet 30 tablet 0     Sig: Take 1 tablet by mouth daily for 30 days.  Intended supply: 30 days

## 2021-05-24 RX ORDER — ATORVASTATIN CALCIUM 10 MG/1
TABLET, FILM COATED ORAL
Qty: 90 TABLET | Refills: 0 | Status: SHIPPED | OUTPATIENT
Start: 2021-05-24 | End: 2021-08-26

## 2021-06-08 ENCOUNTER — PATIENT MESSAGE (OUTPATIENT)
Dept: FAMILY MEDICINE CLINIC | Age: 56
End: 2021-06-08

## 2021-06-08 DIAGNOSIS — Z17.0 MALIGNANT NEOPLASM OF UPPER-OUTER QUADRANT OF RIGHT BREAST IN FEMALE, ESTROGEN RECEPTOR POSITIVE (HCC): ICD-10-CM

## 2021-06-08 DIAGNOSIS — T45.1X5A CHEMOTHERAPY-INDUCED PERIPHERAL NEUROPATHY (HCC): ICD-10-CM

## 2021-06-08 DIAGNOSIS — C50.411 MALIGNANT NEOPLASM OF UPPER-OUTER QUADRANT OF RIGHT BREAST IN FEMALE, ESTROGEN RECEPTOR POSITIVE (HCC): ICD-10-CM

## 2021-06-08 DIAGNOSIS — L27.1 CHEMOTHERAPY-INDUCED ACRAL ERYTHEMA: ICD-10-CM

## 2021-06-08 DIAGNOSIS — G62.0 CHEMOTHERAPY-INDUCED PERIPHERAL NEUROPATHY (HCC): ICD-10-CM

## 2021-06-08 DIAGNOSIS — H66.001 ACUTE SUPPURATIVE OTITIS MEDIA OF RIGHT EAR WITHOUT SPONTANEOUS RUPTURE OF TYMPANIC MEMBRANE, RECURRENCE NOT SPECIFIED: ICD-10-CM

## 2021-06-08 RX ORDER — OXYCODONE AND ACETAMINOPHEN 7.5; 325 MG/1; MG/1
1 TABLET ORAL 2 TIMES DAILY
Qty: 60 TABLET | Refills: 0 | Status: SHIPPED | OUTPATIENT
Start: 2021-06-08 | End: 2021-07-06 | Stop reason: SDUPTHER

## 2021-06-08 RX ORDER — CIPROFLOXACIN AND DEXAMETHASONE 3; 1 MG/ML; MG/ML
SUSPENSION/ DROPS AURICULAR (OTIC)
Qty: 7.5 ML | Refills: 0 | Status: SHIPPED | OUTPATIENT
Start: 2021-06-08

## 2021-06-08 RX ORDER — OXYCODONE AND ACETAMINOPHEN 7.5; 325 MG/1; MG/1
1 TABLET ORAL 2 TIMES DAILY
Qty: 60 TABLET | Refills: 0 | Status: CANCELLED | OUTPATIENT
Start: 2021-06-08 | End: 2021-07-08

## 2021-06-08 NOTE — TELEPHONE ENCOUNTER
From: Jesse Garcia  To: Stanley Briscoe MD  Sent: 6/8/2021 12:35 PM EDT  Subject: Non-Urgent Medical Question    Hi:  I need a refill on the ear drops, and I can't request on this site, not sure why. I also requested the refills on the 7.5 pain meds, can you write that I can  before the 12th if possible. I have my CT with contrast on June 16th, Dr Willow Arredondo thinks it is a good for my peace of mind. My right forearm is bigger these last couple of days, do you think it's from the humidity? I'm trying to drink extra water, and I am wearing my compression shelve.     Marissa

## 2021-06-09 ENCOUNTER — TELEPHONE (OUTPATIENT)
Dept: FAMILY MEDICINE CLINIC | Age: 56
End: 2021-06-09

## 2021-06-09 RX ORDER — ACETIC ACID 20.65 MG/ML
4 SOLUTION AURICULAR (OTIC) 3 TIMES DAILY
Qty: 15 ML | Refills: 1 | Status: SHIPPED | OUTPATIENT
Start: 2021-06-09 | End: 2021-06-16

## 2021-06-21 ENCOUNTER — PATIENT MESSAGE (OUTPATIENT)
Dept: FAMILY MEDICINE CLINIC | Age: 56
End: 2021-06-21

## 2021-06-21 DIAGNOSIS — Z17.0 MALIGNANT NEOPLASM OF UPPER-OUTER QUADRANT OF RIGHT BREAST IN FEMALE, ESTROGEN RECEPTOR POSITIVE (HCC): Primary | ICD-10-CM

## 2021-06-21 DIAGNOSIS — L27.1 CHEMOTHERAPY-INDUCED ACRAL ERYTHEMA: ICD-10-CM

## 2021-06-21 DIAGNOSIS — C50.411 MALIGNANT NEOPLASM OF UPPER-OUTER QUADRANT OF RIGHT BREAST IN FEMALE, ESTROGEN RECEPTOR POSITIVE (HCC): Primary | ICD-10-CM

## 2021-06-21 DIAGNOSIS — Z72.0 TOBACCO ABUSE: ICD-10-CM

## 2021-06-21 RX ORDER — ALBUTEROL SULFATE 90 UG/1
AEROSOL, METERED RESPIRATORY (INHALATION)
Qty: 8.5 G | Refills: 3 | Status: SHIPPED | OUTPATIENT
Start: 2021-06-21 | End: 2021-09-22

## 2021-06-21 RX ORDER — OXYCODONE AND ACETAMINOPHEN 10; 325 MG/1; MG/1
1 TABLET ORAL DAILY PRN
Qty: 30 TABLET | Refills: 0 | Status: SHIPPED | OUTPATIENT
Start: 2021-06-21 | End: 2021-07-19 | Stop reason: SDUPTHER

## 2021-06-21 NOTE — TELEPHONE ENCOUNTER
Erlinda Fothergill is calling to request a refill on the following medication(s):    Last Visit Date (If Applicable):  9/71/0519    Next Visit Date:    Visit date not found    Medication Request:  Requested Prescriptions     Pending Prescriptions Disp Refills    albuterol sulfate HFA (PROAIR HFA) 108 (90 Base) MCG/ACT inhaler 8.5 g 3     Sig: inhale 2 puffs by mouth four times a day if needed

## 2021-06-21 NOTE — TELEPHONE ENCOUNTER
From: Sajan Jolley  To: Mateusz Jones MD  Sent: 6/21/2021 3:06 PM EDT  Subject: Non-Urgent Medical Question    Hi:    I do not see the 10mg Oxycodone on my med list, can you please call in a refill. I had a CT done last week, have you seen the results? I do not see the oncologist until mid July, I was wondering if everything looks good?   Thank you,  Marissa

## 2021-07-03 DIAGNOSIS — Z17.0 MALIGNANT NEOPLASM OF LOWER-OUTER QUADRANT OF RIGHT BREAST OF FEMALE, ESTROGEN RECEPTOR POSITIVE (HCC): ICD-10-CM

## 2021-07-03 DIAGNOSIS — G44.221 CHRONIC TENSION-TYPE HEADACHE, INTRACTABLE: ICD-10-CM

## 2021-07-03 DIAGNOSIS — C50.511 MALIGNANT NEOPLASM OF LOWER-OUTER QUADRANT OF RIGHT BREAST OF FEMALE, ESTROGEN RECEPTOR POSITIVE (HCC): ICD-10-CM

## 2021-07-06 DIAGNOSIS — Z17.0 MALIGNANT NEOPLASM OF UPPER-OUTER QUADRANT OF RIGHT BREAST IN FEMALE, ESTROGEN RECEPTOR POSITIVE (HCC): ICD-10-CM

## 2021-07-06 DIAGNOSIS — T45.1X5A CHEMOTHERAPY-INDUCED PERIPHERAL NEUROPATHY (HCC): ICD-10-CM

## 2021-07-06 DIAGNOSIS — L27.1 CHEMOTHERAPY-INDUCED ACRAL ERYTHEMA: ICD-10-CM

## 2021-07-06 DIAGNOSIS — G62.0 CHEMOTHERAPY-INDUCED PERIPHERAL NEUROPATHY (HCC): ICD-10-CM

## 2021-07-06 DIAGNOSIS — C50.411 MALIGNANT NEOPLASM OF UPPER-OUTER QUADRANT OF RIGHT BREAST IN FEMALE, ESTROGEN RECEPTOR POSITIVE (HCC): ICD-10-CM

## 2021-07-06 RX ORDER — OXYCODONE AND ACETAMINOPHEN 7.5; 325 MG/1; MG/1
1 TABLET ORAL 2 TIMES DAILY
Qty: 60 TABLET | Refills: 0 | Status: SHIPPED | OUTPATIENT
Start: 2021-07-06 | End: 2021-08-05

## 2021-07-06 RX ORDER — DULOXETIN HYDROCHLORIDE 60 MG/1
60 CAPSULE, DELAYED RELEASE ORAL DAILY
Qty: 30 CAPSULE | Refills: 2 | Status: SHIPPED | OUTPATIENT
Start: 2021-07-06 | End: 2021-10-04

## 2021-07-19 DIAGNOSIS — L27.1 CHEMOTHERAPY-INDUCED ACRAL ERYTHEMA: ICD-10-CM

## 2021-07-19 DIAGNOSIS — C50.411 MALIGNANT NEOPLASM OF UPPER-OUTER QUADRANT OF RIGHT BREAST IN FEMALE, ESTROGEN RECEPTOR POSITIVE (HCC): ICD-10-CM

## 2021-07-19 DIAGNOSIS — Z17.0 MALIGNANT NEOPLASM OF UPPER-OUTER QUADRANT OF RIGHT BREAST IN FEMALE, ESTROGEN RECEPTOR POSITIVE (HCC): ICD-10-CM

## 2021-07-20 RX ORDER — OXYCODONE AND ACETAMINOPHEN 10; 325 MG/1; MG/1
1 TABLET ORAL DAILY PRN
Qty: 30 TABLET | Refills: 0 | Status: SHIPPED | OUTPATIENT
Start: 2021-07-20 | End: 2021-08-19 | Stop reason: SDUPTHER

## 2021-07-20 NOTE — TELEPHONE ENCOUNTER
Gareth Carver is calling to request a refill on the following medication(s):    Last Visit Date (If Applicable):  8/21/1457    Next Visit Date:    Visit date not found    Medication Request:  Requested Prescriptions     Pending Prescriptions Disp Refills    oxyCODONE-acetaminophen (PERCOCET)  MG per tablet 30 tablet 0     Sig: Take 1 tablet by mouth daily as needed for Pain for up to 30 days.  Intended supply: 30 days

## 2021-07-21 ENCOUNTER — PATIENT MESSAGE (OUTPATIENT)
Dept: FAMILY MEDICINE CLINIC | Age: 56
End: 2021-07-21

## 2021-08-02 ENCOUNTER — PATIENT MESSAGE (OUTPATIENT)
Dept: FAMILY MEDICINE CLINIC | Age: 56
End: 2021-08-02

## 2021-08-02 DIAGNOSIS — L27.1 CHEMOTHERAPY-INDUCED ACRAL ERYTHEMA: ICD-10-CM

## 2021-08-02 DIAGNOSIS — T45.1X5A CHEMOTHERAPY INDUCED DIARRHEA: Primary | ICD-10-CM

## 2021-08-02 DIAGNOSIS — K52.1 CHEMOTHERAPY INDUCED DIARRHEA: Primary | ICD-10-CM

## 2021-08-02 RX ORDER — OXYCODONE AND ACETAMINOPHEN 7.5; 325 MG/1; MG/1
1 TABLET ORAL 2 TIMES DAILY
Qty: 60 TABLET | Refills: 0 | Status: SHIPPED | OUTPATIENT
Start: 2021-08-02 | End: 2021-09-01 | Stop reason: SDUPTHER

## 2021-08-19 DIAGNOSIS — Z17.0 MALIGNANT NEOPLASM OF UPPER-OUTER QUADRANT OF RIGHT BREAST IN FEMALE, ESTROGEN RECEPTOR POSITIVE (HCC): ICD-10-CM

## 2021-08-19 DIAGNOSIS — L27.1 CHEMOTHERAPY-INDUCED ACRAL ERYTHEMA: ICD-10-CM

## 2021-08-19 DIAGNOSIS — C50.411 MALIGNANT NEOPLASM OF UPPER-OUTER QUADRANT OF RIGHT BREAST IN FEMALE, ESTROGEN RECEPTOR POSITIVE (HCC): ICD-10-CM

## 2021-08-19 RX ORDER — OXYCODONE AND ACETAMINOPHEN 10; 325 MG/1; MG/1
1 TABLET ORAL DAILY PRN
Qty: 30 TABLET | Refills: 0 | Status: SHIPPED | OUTPATIENT
Start: 2021-08-19 | End: 2021-09-16 | Stop reason: SDUPTHER

## 2021-08-26 RX ORDER — ATORVASTATIN CALCIUM 10 MG/1
TABLET, FILM COATED ORAL
Qty: 90 TABLET | Refills: 0 | Status: SHIPPED | OUTPATIENT
Start: 2021-08-26 | End: 2021-11-29

## 2021-09-01 DIAGNOSIS — K52.1 CHEMOTHERAPY INDUCED DIARRHEA: ICD-10-CM

## 2021-09-01 DIAGNOSIS — L27.1 CHEMOTHERAPY-INDUCED ACRAL ERYTHEMA: ICD-10-CM

## 2021-09-01 DIAGNOSIS — T45.1X5A CHEMOTHERAPY INDUCED DIARRHEA: ICD-10-CM

## 2021-09-01 RX ORDER — OXYCODONE AND ACETAMINOPHEN 7.5; 325 MG/1; MG/1
1 TABLET ORAL 2 TIMES DAILY
Qty: 60 TABLET | Refills: 0 | Status: SHIPPED | OUTPATIENT
Start: 2021-09-01 | End: 2021-09-29 | Stop reason: SDUPTHER

## 2021-09-01 NOTE — TELEPHONE ENCOUNTER
Anuj Fierro is calling to request a refill on the following medication(s):    Last Visit Date (If Applicable):  6/69/4332    Next Visit Date:    9/8/2021    Medication Request:  Requested Prescriptions     Pending Prescriptions Disp Refills    oxyCODONE-acetaminophen (PERCOCET) 7.5-325 MG per tablet 60 tablet 0     Sig: Take 1 tablet by mouth 2 times daily for 30 days.  Intended supply: 30 days

## 2021-09-08 ENCOUNTER — HOSPITAL ENCOUNTER (OUTPATIENT)
Age: 56
Setting detail: SPECIMEN
Discharge: HOME | End: 2021-09-08
Payer: COMMERCIAL

## 2021-09-08 ENCOUNTER — OFFICE VISIT (OUTPATIENT)
Dept: FAMILY MEDICINE CLINIC | Age: 56
End: 2021-09-08
Payer: COMMERCIAL

## 2021-09-08 VITALS
OXYGEN SATURATION: 95 % | BODY MASS INDEX: 31.99 KG/M2 | TEMPERATURE: 97.2 F | DIASTOLIC BLOOD PRESSURE: 89 MMHG | SYSTOLIC BLOOD PRESSURE: 136 MMHG | WEIGHT: 180.6 LBS | HEART RATE: 101 BPM

## 2021-09-08 DIAGNOSIS — T45.1X5A CHEMOTHERAPY INDUCED DIARRHEA: ICD-10-CM

## 2021-09-08 DIAGNOSIS — K52.1 CHEMOTHERAPY INDUCED DIARRHEA: ICD-10-CM

## 2021-09-08 DIAGNOSIS — L27.1 CHEMOTHERAPY-INDUCED ACRAL ERYTHEMA: ICD-10-CM

## 2021-09-08 DIAGNOSIS — T45.1X5A CHEMOTHERAPY INDUCED DIARRHEA: Primary | ICD-10-CM

## 2021-09-08 DIAGNOSIS — Z17.0 MALIGNANT NEOPLASM OF LOWER-OUTER QUADRANT OF RIGHT BREAST OF FEMALE, ESTROGEN RECEPTOR POSITIVE (HCC): ICD-10-CM

## 2021-09-08 DIAGNOSIS — C50.511 MALIGNANT NEOPLASM OF LOWER-OUTER QUADRANT OF RIGHT BREAST OF FEMALE, ESTROGEN RECEPTOR POSITIVE (HCC): ICD-10-CM

## 2021-09-08 DIAGNOSIS — T45.1X5A CHEMOTHERAPY-INDUCED PERIPHERAL NEUROPATHY (HCC): ICD-10-CM

## 2021-09-08 DIAGNOSIS — K52.1 CHEMOTHERAPY INDUCED DIARRHEA: Primary | ICD-10-CM

## 2021-09-08 DIAGNOSIS — G62.0 CHEMOTHERAPY-INDUCED PERIPHERAL NEUROPATHY (HCC): ICD-10-CM

## 2021-09-08 PROCEDURE — 99213 OFFICE O/P EST LOW 20 MIN: CPT | Performed by: FAMILY MEDICINE

## 2021-09-08 ASSESSMENT — PATIENT HEALTH QUESTIONNAIRE - PHQ9
SUM OF ALL RESPONSES TO PHQ9 QUESTIONS 1 & 2: 0
SUM OF ALL RESPONSES TO PHQ QUESTIONS 1-9: 0
SUM OF ALL RESPONSES TO PHQ QUESTIONS 1-9: 0
2. FEELING DOWN, DEPRESSED OR HOPELESS: 0
SUM OF ALL RESPONSES TO PHQ QUESTIONS 1-9: 0
1. LITTLE INTEREST OR PLEASURE IN DOING THINGS: 0

## 2021-09-08 NOTE — PROGRESS NOTES
tablet by mouth daily as needed for Pain for up to 30 days. Intended supply: 30 days 30 tablet 0    DULoxetine (CYMBALTA) 60 MG extended release capsule take 1 capsule by mouth daily 30 capsule 2    albuterol sulfate HFA (PROAIR HFA) 108 (90 Base) MCG/ACT inhaler inhale 2 puffs by mouth four times a day if needed 8.5 g 3    ciprofloxacin-dexamethasone (CIPRODEX) 0.3-0.1 % otic suspension instill 4 drops into each ear twice a day 7.5 mL 0    DULoxetine (CYMBALTA) 30 MG extended release capsule Take 1 capsule by mouth daily 90 capsule 1    anastrozole (ARIMIDEX) 1 MG tablet       prochlorperazine (COMPAZINE) 10 MG tablet Take 10 mg by mouth every 6 hours as needed      triamcinolone (KENALOG) 0.1 % cream APPLY TO RASH ON BODY TWICE DAILY (NOT FACE, ARMPIT OR GROIN) (Patient not taking: Reported on 3/23/2021) 80 g 1    valACYclovir (VALTREX) 1 g tablet Take 1 tablet by mouth 2 times daily (Patient not taking: Reported on 3/23/2021) 20 tablet 3    SUMAtriptan (IMITREX) 20 MG/ACT nasal spray 1 spray by Nasal route daily as needed for Migraine (Patient not taking: Reported on 12/10/2020) 1 Inhaler 3    SUMAtriptan (IMITREX) 100 MG tablet Take 1 tablet by mouth once as needed for Migraine 9 tablet 3    fluticasone (FLONASE) 50 MCG/ACT nasal spray 2 sprays by Each Nostril route daily (Patient not taking: Reported on 3/23/2021) 1 Bottle 5    triamcinolone acetonide (KENALOG) 0.1 % paste Apply to teeth 2 times daily.  (Patient not taking: Reported on 12/10/2020) 5 g 1    rOPINIRole (REQUIP) 0.25 MG tablet Take 1 tablet by mouth 3 times daily (Patient not taking: Reported on 12/10/2020) 90 tablet 3    furosemide (LASIX) 20 MG tablet Take 1 tablet by mouth daily (Patient not taking: Reported on 12/10/2020) 10 tablet 0    lidocaine (LMX) 4 % cream Apply thin layer to hands 30 minutes prior to showering (Patient not taking: Reported on 12/10/2020) 45 g 1    tacrolimus (PROTOPIC) 0.1 % ointment Apply to rash on face twice daily (Patient not taking: Reported on 12/10/2020) 30 g 2    lidocaine-prilocaine (EMLA) 2.5-2.5 % cream Apply topically Daily as needed. (Patient not taking: Reported on 3/23/2021) 1 Tube 1     No current facility-administered medications for this visit. ALLERGIES:    Allergies   Allergen Reactions    Dye [Iodides] Anaphylaxis    Eggs Or Egg-Derived Products Anaphylaxis    Shellfish-Derived Products Anaphylaxis, Shortness Of Breath and Swelling       Social History     Tobacco Use    Smoking status: Current Every Day Smoker     Packs/day: 1.00     Years: 40.00     Pack years: 40.00     Types: Cigarettes    Smokeless tobacco: Never Used   Substance Use Topics    Alcohol use: No     Alcohol/week: 0.0 standard drinks     Comment: social      Body mass index is 31.99 kg/m². /89   Pulse 101   Temp 97.2 °F (36.2 °C)   Wt 180 lb 9.6 oz (81.9 kg)   SpO2 95%   BMI 31.99 kg/m²     Subjective:      HPI    61 y.o. female here for a FU. Has gallstones - has a FU with gen surg. Feels that she needs to take some fibers. Patient is doing well overall. She has been following up with the oncologist and due to a metastatic ductal carcinoma right breast which was diagnosed in July 2018. Patient is status post chemotherapy and since then she has been having increased pain and continues pain induced by in the chemo. Patient has been on Percocet 7.5 mg twice a day and as well as Percocet 10 for  breakthrough pain. The patient states the pain is well controlled with this medication regimen regimen. //I did talk to a pain specialist for further recommendation before starting her on this high pain medication regimen. Per pain specialist this is the only option to treat her chemo induced pain. Review of Systems   Constitutional: Negative for fever and unexpected weight change. Pertinent items are noted in HPI. Objective:   Physical Exam  Constitutional: VS (see above).    General appearance: normal development, habitus and attention, no deformities. No distress. Eyes: normal conjunctiva and lids. CAV: RRR, no RMG. No edema lower extremities. Pulmo: CTA bilateral, no CWR. Skin: no rashes, lesions or ulcers. Musculoskeletal: normal gait. Nails: no clubbing or cyanosis. Psychiatric: alert and oriented to place, time and person. Normal mood and affect. Assessment:       Diagnosis Orders   1. Chemotherapy induced diarrhea  Pain Management Drug Screen   2. Chemotherapy-induced acral erythema  Pain Management Drug Screen   3. Chemotherapy-induced peripheral neuropathy (HCC)  Pain Management Drug Screen   4. Malignant neoplasm of lower-outer quadrant of right breast of female, estrogen receptor positive ([facility] [address]  Pain Management Drug Screen       Plan: We will continue current care. Patient will follow up with the specialist as indicated. Return in about 3 months (around 12/8/2021), or if symptoms worsen or fail to improve. Orders Placed This Encounter   Procedures    Pain Management Drug Screen     Standing Status:   Future     Standing Expiration Date:   9/8/2022     No orders of the defined types were placed in this encounter. Call or return to clinic prn if these symptoms worsen or fail to improve as anticipated. I have reviewed the instructions with the patient, answering all questions to patient's satisfaction. Genny Sahni received counseling on the following healthy behaviors: nutrition, exercise, and medication adherence  Reviewed prior labs and health maintenance. Continue current medications, diet and exercise. Discussed use, benefit, and side effects of prescribed medications. Barriers to medication compliance addressed. Patient given educational materials - see patient instructions. All patient questions answered. Patient voiced understanding.       Electronically signed by Joni Tee MD on 9/8/2021 at 7:56 AM       (Please note that portions of this note were completed with a voice recognition program. Efforts were made to edit the dictations but occasionally words are mis-transcribed.)

## 2021-09-11 LAB
6-ACETYLMORPHINE, UR: NOT DETECTED
7-AMINOCLONAZEPAM, URINE: NOT DETECTED
ALPHA-OH-ALPRAZ, URINE: NOT DETECTED
ALPHA-OH-MIDAZOLAM, URINE: NOT DETECTED
ALPRAZOLAM, URINE: NOT DETECTED
AMPHETAMINES, URINE: NOT DETECTED
BARBITURATES, URINE: NOT DETECTED
BENZOYLECGONINE, UR: NOT DETECTED
BUPRENORPHINE URINE: NOT DETECTED
CARISOPRODOL, UR: NOT DETECTED
CLONAZEPAM, URINE: NOT DETECTED
CODEINE, URINE: NOT DETECTED
CREATININE URINE: 32.2 MG/DL (ref 20–400)
DIAZEPAM, URINE: NOT DETECTED
EER PAIN MGT DRUG PANEL, HIGH RES/EMIT U: NORMAL
ETHYL GLUCURONIDE UR: NOT DETECTED
FENTANYL URINE: NOT DETECTED
GABAPENTIN: NOT DETECTED
HYDROCODONE, URINE: NOT DETECTED
HYDROMORPHONE, URINE: NOT DETECTED
LORAZEPAM, URINE: NOT DETECTED
MARIJUANA METAB, UR: NOT DETECTED
MDA, UR: NOT DETECTED
MDEA, EVE, UR: NOT DETECTED
MDMA URINE: NOT DETECTED
MEPERIDINE METAB, UR: NOT DETECTED
METHADONE, URINE: NOT DETECTED
METHAMPHETAMINE, URINE: NOT DETECTED
METHYLPHENIDATE: NOT DETECTED
MIDAZOLAM, URINE: NOT DETECTED
MORPHINE URINE: NOT DETECTED
NALOXONE URINE: NOT DETECTED
NORBUPRENORPHINE, URINE: NOT DETECTED
NORDIAZEPAM, URINE: NOT DETECTED
NORFENTANYL, URINE: NOT DETECTED
NORHYDROCODONE, URINE: NOT DETECTED
NOROXYCODONE, URINE: PRESENT
NOROXYMORPHONE, URINE: NOT DETECTED
OXAZEPAM, URINE: NOT DETECTED
OXYCODONE URINE: PRESENT
OXYMORPHONE, URINE: PRESENT
PAIN MGT DRUG PANEL, HI RES, UR: NORMAL
PCP,URINE: NOT DETECTED
PHENTERMINE, UR: NOT DETECTED
PREGABALIN: NOT DETECTED
TAPENTADOL, URINE: NOT DETECTED
TAPENTADOL-O-SULFATE, URINE: NOT DETECTED
TEMAZEPAM, URINE: NOT DETECTED
TRAMADOL, URINE: NOT DETECTED
ZOLPIDEM METABOLITE (ZCA), URINE: NOT DETECTED
ZOLPIDEM, URINE: NOT DETECTED

## 2021-09-16 DIAGNOSIS — Z17.0 MALIGNANT NEOPLASM OF UPPER-OUTER QUADRANT OF RIGHT BREAST IN FEMALE, ESTROGEN RECEPTOR POSITIVE (HCC): ICD-10-CM

## 2021-09-16 DIAGNOSIS — C50.411 MALIGNANT NEOPLASM OF UPPER-OUTER QUADRANT OF RIGHT BREAST IN FEMALE, ESTROGEN RECEPTOR POSITIVE (HCC): ICD-10-CM

## 2021-09-16 DIAGNOSIS — L27.1 CHEMOTHERAPY-INDUCED ACRAL ERYTHEMA: ICD-10-CM

## 2021-09-16 RX ORDER — OXYCODONE AND ACETAMINOPHEN 10; 325 MG/1; MG/1
1 TABLET ORAL DAILY PRN
Qty: 30 TABLET | Refills: 0 | Status: SHIPPED | OUTPATIENT
Start: 2021-09-16 | End: 2021-10-14 | Stop reason: SDUPTHER

## 2021-09-22 DIAGNOSIS — Z72.0 TOBACCO ABUSE: ICD-10-CM

## 2021-09-22 RX ORDER — ALBUTEROL SULFATE 90 UG/1
AEROSOL, METERED RESPIRATORY (INHALATION)
Qty: 18 G | Refills: 3 | Status: SHIPPED | OUTPATIENT
Start: 2021-09-22

## 2021-09-29 DIAGNOSIS — K52.1 CHEMOTHERAPY INDUCED DIARRHEA: ICD-10-CM

## 2021-09-29 DIAGNOSIS — T45.1X5A CHEMOTHERAPY INDUCED DIARRHEA: ICD-10-CM

## 2021-09-29 DIAGNOSIS — L27.1 CHEMOTHERAPY-INDUCED ACRAL ERYTHEMA: ICD-10-CM

## 2021-09-29 RX ORDER — OXYCODONE AND ACETAMINOPHEN 7.5; 325 MG/1; MG/1
1 TABLET ORAL 2 TIMES DAILY
Qty: 60 TABLET | Refills: 0 | Status: SHIPPED | OUTPATIENT
Start: 2021-09-29 | End: 2021-10-25 | Stop reason: SDUPTHER

## 2021-09-29 NOTE — TELEPHONE ENCOUNTER
Yrn Velasco is calling to request a refill on the following medication(s):    Last Visit Date (If Applicable):  1/5/0214    Next Visit Date:    Visit date not found    Medication Request:  Requested Prescriptions     Pending Prescriptions Disp Refills    oxyCODONE-acetaminophen (PERCOCET) 7.5-325 MG per tablet 60 tablet 0     Sig: Take 1 tablet by mouth 2 times daily for 30 days.  Intended supply: 30 days

## 2021-10-04 DIAGNOSIS — G44.221 CHRONIC TENSION-TYPE HEADACHE, INTRACTABLE: ICD-10-CM

## 2021-10-04 DIAGNOSIS — Z17.0 MALIGNANT NEOPLASM OF LOWER-OUTER QUADRANT OF RIGHT BREAST OF FEMALE, ESTROGEN RECEPTOR POSITIVE (HCC): ICD-10-CM

## 2021-10-04 DIAGNOSIS — C50.511 MALIGNANT NEOPLASM OF LOWER-OUTER QUADRANT OF RIGHT BREAST OF FEMALE, ESTROGEN RECEPTOR POSITIVE (HCC): ICD-10-CM

## 2021-10-04 RX ORDER — DULOXETIN HYDROCHLORIDE 60 MG/1
60 CAPSULE, DELAYED RELEASE ORAL DAILY
Qty: 30 CAPSULE | Refills: 2 | Status: SHIPPED | OUTPATIENT
Start: 2021-10-04 | End: 2022-01-03

## 2021-10-04 RX ORDER — DULOXETIN HYDROCHLORIDE 30 MG/1
CAPSULE, DELAYED RELEASE ORAL
Qty: 90 CAPSULE | Refills: 1 | Status: SHIPPED | OUTPATIENT
Start: 2021-10-04 | End: 2022-04-07

## 2021-10-04 NOTE — TELEPHONE ENCOUNTER
Jolly Severino is calling to request a refill on the following medication(s):    Last Visit Date (If Applicable):  0/5/2841    Next Visit Date:    Visit date not found    Medication Request:  Requested Prescriptions     Pending Prescriptions Disp Refills    DULoxetine (CYMBALTA) 30 MG extended release capsule [Pharmacy Med Name: DULOXETINE HCL DR 30 MG CAP] 90 capsule 1     Sig: take 1 capsule by mouth once daily    DULoxetine (CYMBALTA) 60 MG extended release capsule [Pharmacy Med Name: DULOXETINE HCL DR 60 MG CAP] 30 capsule 2     Sig: take 1 capsule by mouth daily

## 2021-10-26 ENCOUNTER — PATIENT MESSAGE (OUTPATIENT)
Dept: FAMILY MEDICINE CLINIC | Age: 56
End: 2021-10-26

## 2021-10-26 DIAGNOSIS — K52.1 CHEMOTHERAPY INDUCED DIARRHEA: ICD-10-CM

## 2021-10-26 DIAGNOSIS — L27.1 CHEMOTHERAPY-INDUCED ACRAL ERYTHEMA: ICD-10-CM

## 2021-10-26 DIAGNOSIS — T45.1X5A CHEMOTHERAPY INDUCED DIARRHEA: ICD-10-CM

## 2021-10-26 RX ORDER — OXYCODONE AND ACETAMINOPHEN 7.5; 325 MG/1; MG/1
1 TABLET ORAL 2 TIMES DAILY PRN
Qty: 70 TABLET | Refills: 0 | Status: SHIPPED | OUTPATIENT
Start: 2021-10-26 | End: 2021-11-19 | Stop reason: SDUPTHER

## 2021-10-26 NOTE — TELEPHONE ENCOUNTER
From: Hermila Guevara  To: José Miguel Vasques MD  Sent: 10/26/2021 8:55 AM EDT  Subject: Non-Urgent Medical Question    Hi, I requested a refill on the 7.5 pain meds. If there is a way for you to write the script so I can  before Saturday that would be great. The 10 work for night time but some days I do take 2 1/2 7.5.    Thank you   Marissa

## 2021-11-11 DIAGNOSIS — C50.411 MALIGNANT NEOPLASM OF UPPER-OUTER QUADRANT OF RIGHT BREAST IN FEMALE, ESTROGEN RECEPTOR POSITIVE (HCC): ICD-10-CM

## 2021-11-11 DIAGNOSIS — Z17.0 MALIGNANT NEOPLASM OF UPPER-OUTER QUADRANT OF RIGHT BREAST IN FEMALE, ESTROGEN RECEPTOR POSITIVE (HCC): ICD-10-CM

## 2021-11-11 DIAGNOSIS — L27.1 CHEMOTHERAPY-INDUCED ACRAL ERYTHEMA: ICD-10-CM

## 2021-11-11 RX ORDER — OXYCODONE AND ACETAMINOPHEN 10; 325 MG/1; MG/1
1 TABLET ORAL DAILY PRN
Qty: 30 TABLET | Refills: 0 | Status: SHIPPED | OUTPATIENT
Start: 2021-11-11 | End: 2021-12-09 | Stop reason: SDUPTHER

## 2021-11-19 DIAGNOSIS — L27.1 CHEMOTHERAPY-INDUCED ACRAL ERYTHEMA: ICD-10-CM

## 2021-11-19 DIAGNOSIS — T45.1X5A CHEMOTHERAPY INDUCED DIARRHEA: ICD-10-CM

## 2021-11-19 DIAGNOSIS — K52.1 CHEMOTHERAPY INDUCED DIARRHEA: ICD-10-CM

## 2021-11-19 RX ORDER — OXYCODONE AND ACETAMINOPHEN 7.5; 325 MG/1; MG/1
1 TABLET ORAL 2 TIMES DAILY PRN
Qty: 70 TABLET | Refills: 0 | Status: SHIPPED | OUTPATIENT
Start: 2021-11-19 | End: 2021-11-24 | Stop reason: SDUPTHER

## 2021-11-19 NOTE — TELEPHONE ENCOUNTER
Tennis Breaker is calling to request a refill on the following medication(s):    Last Visit Date (If Applicable):  3/4/2417    Next Visit Date:    Visit date not found    Medication Request:  Requested Prescriptions     Pending Prescriptions Disp Refills    oxyCODONE-acetaminophen (PERCOCET) 7.5-325 MG per tablet 70 tablet 0     Sig: Take 1 tablet by mouth 2 times daily as needed for Pain for up to 30 days.  Intended supply: 30 days

## 2021-11-24 ENCOUNTER — TELEPHONE (OUTPATIENT)
Dept: FAMILY MEDICINE CLINIC | Age: 56
End: 2021-11-24

## 2021-11-24 DIAGNOSIS — T45.1X5A CHEMOTHERAPY INDUCED DIARRHEA: ICD-10-CM

## 2021-11-24 DIAGNOSIS — K52.1 CHEMOTHERAPY INDUCED DIARRHEA: ICD-10-CM

## 2021-11-24 DIAGNOSIS — L27.1 CHEMOTHERAPY-INDUCED ACRAL ERYTHEMA: ICD-10-CM

## 2021-11-24 RX ORDER — OXYCODONE AND ACETAMINOPHEN 7.5; 325 MG/1; MG/1
1 TABLET ORAL 2 TIMES DAILY PRN
Qty: 60 TABLET | Refills: 0 | Status: SHIPPED | OUTPATIENT
Start: 2021-11-24 | End: 2021-12-20 | Stop reason: SDUPTHER

## 2021-11-24 NOTE — TELEPHONE ENCOUNTER
[facility] request percocet script be changed from #70 to #60, pt's insurance will only cover #60 for BID.

## 2021-11-29 RX ORDER — ATORVASTATIN CALCIUM 10 MG/1
TABLET, FILM COATED ORAL
Qty: 90 TABLET | Refills: 0 | Status: SHIPPED | OUTPATIENT
Start: 2021-11-29 | End: 2022-03-07

## 2021-11-29 NOTE — TELEPHONE ENCOUNTER
Lucius Hahn is calling to request a refill on the following medication(s):    Last Visit Date (If Applicable):  2/4/7486    Next Visit Date:    Visit date not found    Medication Request:  Requested Prescriptions     Pending Prescriptions Disp Refills    atorvastatin (LIPITOR) 10 MG tablet [Pharmacy Med Name: ATORVASTATIN 10 MG TABLET] 90 tablet 0     Sig: take 1 tablet by mouth once daily

## 2021-12-09 ENCOUNTER — PATIENT MESSAGE (OUTPATIENT)
Dept: FAMILY MEDICINE CLINIC | Age: 56
End: 2021-12-09

## 2021-12-09 DIAGNOSIS — C50.411 MALIGNANT NEOPLASM OF UPPER-OUTER QUADRANT OF RIGHT BREAST IN FEMALE, ESTROGEN RECEPTOR POSITIVE (HCC): ICD-10-CM

## 2021-12-09 DIAGNOSIS — L27.1 CHEMOTHERAPY-INDUCED ACRAL ERYTHEMA: ICD-10-CM

## 2021-12-09 DIAGNOSIS — Z17.0 MALIGNANT NEOPLASM OF UPPER-OUTER QUADRANT OF RIGHT BREAST IN FEMALE, ESTROGEN RECEPTOR POSITIVE (HCC): ICD-10-CM

## 2021-12-09 RX ORDER — OXYCODONE AND ACETAMINOPHEN 10; 325 MG/1; MG/1
1 TABLET ORAL DAILY PRN
Qty: 30 TABLET | Refills: 0 | Status: SHIPPED | OUTPATIENT
Start: 2021-12-09 | End: 2022-01-08

## 2021-12-09 NOTE — TELEPHONE ENCOUNTER
Mazin Davis is calling to request a refill on the following medication(s):    Last Visit Date (If Applicable):  7/0/3744    Next Visit Date:    12/23/2021    Medication Request:  Requested Prescriptions      No prescriptions requested or ordered in this encounter

## 2021-12-20 ENCOUNTER — PATIENT MESSAGE (OUTPATIENT)
Dept: FAMILY MEDICINE CLINIC | Age: 56
End: 2021-12-20

## 2021-12-20 DIAGNOSIS — T45.1X5A CHEMOTHERAPY INDUCED DIARRHEA: ICD-10-CM

## 2021-12-20 DIAGNOSIS — K52.1 CHEMOTHERAPY INDUCED DIARRHEA: ICD-10-CM

## 2021-12-20 DIAGNOSIS — L27.1 CHEMOTHERAPY-INDUCED ACRAL ERYTHEMA: ICD-10-CM

## 2021-12-20 RX ORDER — OXYCODONE AND ACETAMINOPHEN 7.5; 325 MG/1; MG/1
1 TABLET ORAL 2 TIMES DAILY PRN
Qty: 70 TABLET | Refills: 0 | Status: SHIPPED | OUTPATIENT
Start: 2021-12-20 | End: 2022-01-20 | Stop reason: SDUPTHER

## 2021-12-20 NOTE — TELEPHONE ENCOUNTER
From: Jennell Schlatter  To: Dr. Tristan Radford  Sent: 12/20/2021 4:34 PM EST  Subject: Non-Urgent Medical Question    Hi. I have an appointment with your Thursday. I do need a refill on the 7.5. Back on October 26th we talked about some days I'm up at 4:30-5 so I do take more then 2 pills. The pharmacy filled the normal 60 count. Can you change the script this time for 70 and give them okay to fill before the 25th. I know they have to wait a couple days. See you in a couple days.    Marissa

## 2021-12-23 ENCOUNTER — OFFICE VISIT (OUTPATIENT)
Dept: FAMILY MEDICINE CLINIC | Age: 56
End: 2021-12-23
Payer: COMMERCIAL

## 2021-12-23 VITALS
SYSTOLIC BLOOD PRESSURE: 139 MMHG | HEART RATE: 113 BPM | DIASTOLIC BLOOD PRESSURE: 89 MMHG | BODY MASS INDEX: 31.78 KG/M2 | TEMPERATURE: 98.2 F | OXYGEN SATURATION: 93 % | WEIGHT: 179.4 LBS

## 2021-12-23 DIAGNOSIS — K52.1 CHEMOTHERAPY INDUCED DIARRHEA: ICD-10-CM

## 2021-12-23 DIAGNOSIS — T45.1X5A CHEMOTHERAPY INDUCED DIARRHEA: ICD-10-CM

## 2021-12-23 DIAGNOSIS — Z17.0 MALIGNANT NEOPLASM OF UPPER-OUTER QUADRANT OF RIGHT BREAST IN FEMALE, ESTROGEN RECEPTOR POSITIVE (HCC): ICD-10-CM

## 2021-12-23 DIAGNOSIS — Z13.6 ENCOUNTER FOR LIPID SCREENING FOR CARDIOVASCULAR DISEASE: ICD-10-CM

## 2021-12-23 DIAGNOSIS — L27.1 CHEMOTHERAPY-INDUCED ACRAL ERYTHEMA: Primary | ICD-10-CM

## 2021-12-23 DIAGNOSIS — Z13.1 DIABETES MELLITUS SCREENING: ICD-10-CM

## 2021-12-23 DIAGNOSIS — Z11.59 NEED FOR HEPATITIS C SCREENING TEST: ICD-10-CM

## 2021-12-23 DIAGNOSIS — Z13.220 ENCOUNTER FOR LIPID SCREENING FOR CARDIOVASCULAR DISEASE: ICD-10-CM

## 2021-12-23 DIAGNOSIS — C50.411 MALIGNANT NEOPLASM OF UPPER-OUTER QUADRANT OF RIGHT BREAST IN FEMALE, ESTROGEN RECEPTOR POSITIVE (HCC): ICD-10-CM

## 2021-12-23 LAB — HBA1C MFR BLD: 5.8 %

## 2021-12-23 PROCEDURE — 99213 OFFICE O/P EST LOW 20 MIN: CPT | Performed by: FAMILY MEDICINE

## 2021-12-23 PROCEDURE — 83036 HEMOGLOBIN GLYCOSYLATED A1C: CPT | Performed by: FAMILY MEDICINE

## 2021-12-23 SDOH — ECONOMIC STABILITY: FOOD INSECURITY: WITHIN THE PAST 12 MONTHS, YOU WORRIED THAT YOUR FOOD WOULD RUN OUT BEFORE YOU GOT MONEY TO BUY MORE.: NEVER TRUE

## 2021-12-23 SDOH — ECONOMIC STABILITY: FOOD INSECURITY: WITHIN THE PAST 12 MONTHS, THE FOOD YOU BOUGHT JUST DIDN'T LAST AND YOU DIDN'T HAVE MONEY TO GET MORE.: NEVER TRUE

## 2021-12-23 ASSESSMENT — PATIENT HEALTH QUESTIONNAIRE - PHQ9
SUM OF ALL RESPONSES TO PHQ QUESTIONS 1-9: 0
2. FEELING DOWN, DEPRESSED OR HOPELESS: 0
1. LITTLE INTEREST OR PLEASURE IN DOING THINGS: 0
SUM OF ALL RESPONSES TO PHQ9 QUESTIONS 1 & 2: 0
SUM OF ALL RESPONSES TO PHQ QUESTIONS 1-9: 0
SUM OF ALL RESPONSES TO PHQ QUESTIONS 1-9: 0

## 2021-12-23 ASSESSMENT — SOCIAL DETERMINANTS OF HEALTH (SDOH): HOW HARD IS IT FOR YOU TO PAY FOR THE VERY BASICS LIKE FOOD, HOUSING, MEDICAL CARE, AND HEATING?: NOT HARD AT ALL

## 2021-12-23 NOTE — PROGRESS NOTES
General FM note    Cornelia Lindsey is a 61 y.o. female who presents today for follow up on her  medical conditions as noted below. Cornelia Lindsey is c/o of   Chief Complaint   Patient presents with    Medication Check       Patient Active Problem List:     H/O severe sun exposure     Chronic tension-type headache, intractable     Essential hypertension     Microscopic hematuria     Lower abdominal pain     Malignant neoplasm of upper-outer quadrant of breast in female, estrogen receptor positive ([address]     Chemotherapy induced diarrhea     Chemotherapy-induced acral erythema     Past Medical History:   Diagnosis Date    Anxiety     Asthma     Depression     Headache     Hyperlipidemia     Hypertension     Malignant neoplasm of upper-outer quadrant of breast in female, estrogen receptor positive ([address] 8/7/2018      Past Surgical History:   Procedure Laterality Date    BLADDER SURGERY  2000    BREAST SURGERY      cyst removed left breast    PARTIAL HYSTERECTOMY      [place] [facility] VAD W/SUBQ PORT AGE 5 YR/> Left 8/13/2018    PORT INSERTION WITH US AND FLUORO performed by Urban Ramey MD at [address] [place] [address] Drive SALPINGO-OOPHORECTOMY Left     ectopic pregnancy treated by Dr. Colton Swain TUNNELED VENOUS PORT PLACEMENT Left 08/13/2018    chemo port left chest     Family History   Problem Relation Age of Onset    Arthritis Mother         rheumatoid    Cancer Father         lung cancer    Other Father         circulatory issues    Breast Cancer Paternal Grandmother     Cancer Sister         skin cancer    Colon Cancer Other      Current Outpatient Medications   Medication Sig Dispense Refill    oxyCODONE-acetaminophen (PERCOCET) 7.5-325 MG per tablet Take 1 tablet by mouth 2 times daily as needed for Pain for up to 30 doses. Intended supply: 30 days 70 tablet 0    oxyCODONE-acetaminophen (PERCOCET)  MG per tablet Take 1 tablet by mouth daily as needed for Pain for up to 30 days. Intended supply: 30 days 30 tablet 0    atorvastatin (LIPITOR) 10 MG tablet take 1 tablet by mouth once daily 90 tablet 0    DULoxetine (CYMBALTA) 30 MG extended release capsule take 1 capsule by mouth once daily 90 capsule 1    DULoxetine (CYMBALTA) 60 MG extended release capsule take 1 capsule by mouth daily 30 capsule 2    albuterol sulfate  (90 Base) MCG/ACT inhaler INHALE 2 PUFFS BY MOUTH 4 TIMES DAILY IF NEEDED, 18 g 3    ciprofloxacin-dexamethasone (CIPRODEX) 0.3-0.1 % otic suspension instill 4 drops into each ear twice a day 7.5 mL 0    anastrozole (ARIMIDEX) 1 MG tablet       fluticasone (FLONASE) 50 MCG/ACT nasal spray 2 sprays by Each Nostril route daily 1 Bottle 5    prochlorperazine (COMPAZINE) 10 MG tablet Take 10 mg by mouth every 6 hours as needed      triamcinolone (KENALOG) 0.1 % cream APPLY TO RASH ON BODY TWICE DAILY (NOT FACE, ARMPIT OR GROIN) (Patient not taking: Reported on 3/23/2021) 80 g 1    valACYclovir (VALTREX) 1 g tablet Take 1 tablet by mouth 2 times daily (Patient not taking: Reported on 3/23/2021) 20 tablet 3    SUMAtriptan (IMITREX) 20 MG/ACT nasal spray 1 spray by Nasal route daily as needed for Migraine (Patient not taking: Reported on 12/10/2020) 1 Inhaler 3    SUMAtriptan (IMITREX) 100 MG tablet Take 1 tablet by mouth once as needed for Migraine 9 tablet 3    triamcinolone acetonide (KENALOG) 0.1 % paste Apply to teeth 2 times daily.  (Patient not taking: Reported on 12/10/2020) 5 g 1    rOPINIRole (REQUIP) 0.25 MG tablet Take 1 tablet by mouth 3 times daily (Patient not taking: Reported on 12/10/2020) 90 tablet 3    furosemide (LASIX) 20 MG tablet Take 1 tablet by mouth daily (Patient not taking: Reported on 12/10/2020) 10 tablet 0    lidocaine (LMX) 4 % cream Apply thin layer to hands 30 minutes prior to showering (Patient not taking: Reported on 12/10/2020) 45 g 1    tacrolimus (PROTOPIC) 0.1 % ointment Apply to rash on face twice daily (Patient not taking: Reported on 12/10/2020) 30 g 2    lidocaine-prilocaine (EMLA) 2.5-2.5 % cream Apply topically Daily as needed. (Patient not taking: Reported on 3/23/2021) 1 Tube 1     No current facility-administered medications for this visit. ALLERGIES:    Allergies   Allergen Reactions    Dye [Iodides] Anaphylaxis    Eggs Or Egg-Derived Products Anaphylaxis    Shellfish-Derived Products Anaphylaxis, Shortness Of Breath and Swelling       Social History     Tobacco Use    Smoking status: Current Every Day Smoker     Packs/day: 1.00     Years: 40.00     Pack years: 40.00     Types: Cigarettes    Smokeless tobacco: Never Used   Substance Use Topics    Alcohol use: No     Alcohol/week: 0.0 standard drinks     Comment: social      Body mass index is 31.78 kg/m². /89   Pulse 113   Temp 98.2 °F (36.8 °C)   Wt 179 lb 6.4 oz (81.4 kg)   SpO2 93%   BMI 31.78 kg/m²     Subjective:      HPI    61 y.o. female coming today for follow-up. She is status post metastatic ductal carcinoma right with treatment of neoadjuvant chemotherapy radiation. July 2018. Due to the chemotherapy patient has been experiencing increased pain discomfort/neuropathy in her hands feet. Constant pain even due to radiation at the right axilla area. Not daily but then it hits her that there is \"nothing inside of her\"    She also had to put her dog down. Tells me that her grand daughter has been FU with rheumatologist due to Lupus. Review of Systems   Constitutional: Negative for fever and unexpected weight change. Pertinent items are noted in HPI. Objective:   Physical Exam  Constitutional: VS (see above). General appearance: normal development, habitus and attention, no deformities. No distress. Eyes: normal conjunctiva and lids. CAV: RRR, no RMG. No edema lower extremities. Pulmo: CTA bilateral, no CWR. Her provider or with  Skin: no rashes, lesions or ulcers. Musculoskeletal: normal gait.  Nails: no clubbing or cyanosis. R arm sleeve present. R axilla area: soft tissue/ scaring palp at the upper lateral pect muscle. Psychiatric: alert and oriented to place, time and person. Normal mood and affect. A1C: 5.8. Assessment:       Diagnosis Orders   1. Chemotherapy-induced acral erythema     2. Diabetes mellitus screening  POCT glycosylated hemoglobin (Hb A1C)   3. Encounter for lipid screening for cardiovascular disease  Lipid Panel   4. Need for hepatitis C screening test  Hepatitis C Antibody   5. Malignant neoplasm of upper-outer quadrant of right breast in female, estrogen receptor positive ([facility] [address]     6. Chemotherapy induced diarrhea         Plan:   The patient is doing well overall. But due to the extensive chemotherapy and radiation which she had with the metastatic breast cancer right she has increased pain she says getting up in the morning with no pain medication which she did take not this morning does not lot of nerve pain tingling. She hopes that she is able to  her pain medication today. I did increase the Percocet 7.5 to is 70 tablets a month due to the patient's worsening pain at times. Return in about 3 months (around 3/23/2022), or if symptoms worsen or fail to improve. Orders Placed This Encounter   Procedures    Lipid Panel     Standing Status:   Future     Standing Expiration Date:   12/23/2022     Order Specific Question:   Is Patient Fasting?/# of Hours     Answer:   yes    Hepatitis C Antibody     Standing Status:   Future     Standing Expiration Date:   12/23/2022    POCT glycosylated hemoglobin (Hb A1C)     No orders of the defined types were placed in this encounter. Call or return to clinic prn if these symptoms worsen or fail to improve as anticipated. I have reviewed the instructions with the patient, answering all questions to patient's satisfaction.       Eleanor Martini received counseling on the following healthy behaviors: nutrition, exercise, and medication adherence  Reviewed prior labs and health maintenance. Continue current medications, diet and exercise. Discussed use, benefit, and side effects of prescribed medications. Barriers to medication compliance addressed. Patient given educational materials - see patient instructions. All patient questions answered. Patient voiced understanding.       Electronically signed by Desiree Vick MD on 12/23/2021 at 9:01 AM       (Please note that portions of this note were completed with a voice recognition program. Efforts were made to edit the dictations but occasionally words are mis-transcribed.)

## 2022-01-02 DIAGNOSIS — C50.511 MALIGNANT NEOPLASM OF LOWER-OUTER QUADRANT OF RIGHT BREAST OF FEMALE, ESTROGEN RECEPTOR POSITIVE (HCC): ICD-10-CM

## 2022-01-02 DIAGNOSIS — Z17.0 MALIGNANT NEOPLASM OF LOWER-OUTER QUADRANT OF RIGHT BREAST OF FEMALE, ESTROGEN RECEPTOR POSITIVE (HCC): ICD-10-CM

## 2022-01-02 DIAGNOSIS — G44.221 CHRONIC TENSION-TYPE HEADACHE, INTRACTABLE: ICD-10-CM

## 2022-01-03 RX ORDER — DULOXETIN HYDROCHLORIDE 60 MG/1
60 CAPSULE, DELAYED RELEASE ORAL DAILY
Qty: 30 CAPSULE | Refills: 2 | Status: SHIPPED | OUTPATIENT
Start: 2022-01-03 | End: 2022-04-07

## 2022-01-03 NOTE — TELEPHONE ENCOUNTER
Jessica Medina is calling to request a refill on the following medication(s):    Last Visit Date (If Applicable):  89/71/4155    Next Visit Date:    Visit date not found    Medication Request:  Requested Prescriptions     Pending Prescriptions Disp Refills    DULoxetine (CYMBALTA) 60 MG extended release capsule [Pharmacy Med Name: DULOXETINE HCL DR 60 MG CAP] 30 capsule 2     Sig: take 1 capsule by mouth daily

## 2022-01-09 ENCOUNTER — PATIENT MESSAGE (OUTPATIENT)
Dept: FAMILY MEDICINE CLINIC | Age: 57
End: 2022-01-09

## 2022-01-09 DIAGNOSIS — Z17.0 MALIGNANT NEOPLASM OF UPPER-OUTER QUADRANT OF RIGHT BREAST IN FEMALE, ESTROGEN RECEPTOR POSITIVE (HCC): Primary | ICD-10-CM

## 2022-01-09 DIAGNOSIS — T45.1X5A CHEMOTHERAPY-INDUCED PERIPHERAL NEUROPATHY (HCC): ICD-10-CM

## 2022-01-09 DIAGNOSIS — G62.0 CHEMOTHERAPY-INDUCED PERIPHERAL NEUROPATHY (HCC): ICD-10-CM

## 2022-01-09 DIAGNOSIS — C50.411 MALIGNANT NEOPLASM OF UPPER-OUTER QUADRANT OF RIGHT BREAST IN FEMALE, ESTROGEN RECEPTOR POSITIVE (HCC): Primary | ICD-10-CM

## 2022-01-09 DIAGNOSIS — L27.1 CHEMOTHERAPY-INDUCED ACRAL ERYTHEMA: ICD-10-CM

## 2022-01-10 RX ORDER — OXYCODONE AND ACETAMINOPHEN 10; 325 MG/1; MG/1
1 TABLET ORAL DAILY
Qty: 30 TABLET | Refills: 0 | Status: SHIPPED | OUTPATIENT
Start: 2022-01-10 | End: 2022-02-07 | Stop reason: SDUPTHER

## 2022-01-10 NOTE — TELEPHONE ENCOUNTER
From: Erlinda Fothergill  To: Dr. Marisol Martini  Sent: 1/9/2022 1:41 PM EST  Subject: Oxycodone 10mg    Hi. I do not see this on my medication list. I do need a refill. Last Sunday we adopted another sweet rescue. This one is a preschooler lol. Shes only a little over a year old. Her name is Daisy gunter. The picture I attached is her sleeping while Im working.      I hope you have a good week,    Marissa

## 2022-01-20 DIAGNOSIS — K52.1 CHEMOTHERAPY INDUCED DIARRHEA: ICD-10-CM

## 2022-01-20 DIAGNOSIS — L27.1 CHEMOTHERAPY-INDUCED ACRAL ERYTHEMA: ICD-10-CM

## 2022-01-20 DIAGNOSIS — T45.1X5A CHEMOTHERAPY INDUCED DIARRHEA: ICD-10-CM

## 2022-01-20 RX ORDER — OXYCODONE AND ACETAMINOPHEN 7.5; 325 MG/1; MG/1
1 TABLET ORAL 2 TIMES DAILY PRN
Qty: 70 TABLET | Refills: 0 | Status: SHIPPED | OUTPATIENT
Start: 2022-01-20 | End: 2022-02-16 | Stop reason: SDUPTHER

## 2022-02-07 DIAGNOSIS — Z17.0 MALIGNANT NEOPLASM OF UPPER-OUTER QUADRANT OF RIGHT BREAST IN FEMALE, ESTROGEN RECEPTOR POSITIVE (HCC): ICD-10-CM

## 2022-02-07 DIAGNOSIS — C50.411 MALIGNANT NEOPLASM OF UPPER-OUTER QUADRANT OF RIGHT BREAST IN FEMALE, ESTROGEN RECEPTOR POSITIVE (HCC): ICD-10-CM

## 2022-02-07 DIAGNOSIS — G62.0 CHEMOTHERAPY-INDUCED PERIPHERAL NEUROPATHY (HCC): ICD-10-CM

## 2022-02-07 DIAGNOSIS — L27.1 CHEMOTHERAPY-INDUCED ACRAL ERYTHEMA: ICD-10-CM

## 2022-02-07 DIAGNOSIS — T45.1X5A CHEMOTHERAPY-INDUCED PERIPHERAL NEUROPATHY (HCC): ICD-10-CM

## 2022-02-07 NOTE — TELEPHONE ENCOUNTER
Pharm requested    Health Maintenance   Topic Date Due    Hepatitis C screen  Never done    HIV screen  Never done    Lipid screen  02/10/2017    Potassium monitoring  12/27/2019    Creatinine monitoring  12/27/2019    Shingles Vaccine (2 of 2) 11/04/2020    COVID-19 Vaccine (3 - Booster for Moderna series) 09/09/2021    Breast cancer screen  04/05/2022    Low dose CT lung screening  06/16/2022    A1C test (Diabetic or Prediabetic)  12/23/2022    Depression Screen  12/23/2022    Colon cancer screen colonoscopy  05/14/2028    Pneumococcal 0-64 years Vaccine (2 of 2 - PPSV23) 01/30/2029    DTaP/Tdap/Td vaccine (3 - Td or Tdap) 07/11/2029    Flu vaccine  Completed    Hepatitis A vaccine  Aged Out    Hepatitis B vaccine  Aged Out    Hib vaccine  Aged Out    Meningococcal (ACWY) vaccine  Aged Out             (applicable per patient's age: Cancer Screenings, Depression Screening, Fall Risk Screening, Immunizations)    Hemoglobin A1C (%)   Date Value   12/23/2021 5.8     LDL Cholesterol (mg/dL)   Date Value   02/10/2016 93     AST (U/L)   Date Value   12/27/2018 14     ALT (U/L)   Date Value   12/27/2018 9     BUN (mg/dL)   Date Value   12/27/2018 10      (goal A1C is < 7)   (goal LDL is <100) need 30-50% reduction from baseline     BP Readings from Last 3 Encounters:   12/23/21 139/89   09/08/21 136/89   03/23/21 134/88    (goal /80)      All Future Testing planned in [facility]:  Lab Frequency Next Occurrence   JESSY DIGITAL SCREEN AUG UNILAT LT* Once 04/12/2021   Lipid Panel Once 12/23/2021   Hepatitis C Antibody Once 12/23/2021       Next Visit Date:  No future appointments.          Patient Active Problem List:     H/O severe sun exposure     Chronic tension-type headache, intractable     Essential hypertension     Microscopic hematuria     Lower abdominal pain     Malignant neoplasm of upper-outer quadrant of breast in female, estrogen receptor positive ([facility] [address]     Chemotherapy induced diarrhea Chemotherapy-induced acral erythema

## 2022-02-08 RX ORDER — OXYCODONE AND ACETAMINOPHEN 10; 325 MG/1; MG/1
1 TABLET ORAL DAILY
Qty: 30 TABLET | Refills: 0 | Status: SHIPPED | OUTPATIENT
Start: 2022-02-08 | End: 2022-03-07 | Stop reason: SDUPTHER

## 2022-02-16 ENCOUNTER — PATIENT MESSAGE (OUTPATIENT)
Dept: FAMILY MEDICINE CLINIC | Age: 57
End: 2022-02-16

## 2022-02-16 DIAGNOSIS — K52.1 CHEMOTHERAPY INDUCED DIARRHEA: ICD-10-CM

## 2022-02-16 DIAGNOSIS — L27.1 CHEMOTHERAPY-INDUCED ACRAL ERYTHEMA: ICD-10-CM

## 2022-02-16 DIAGNOSIS — T45.1X5A CHEMOTHERAPY INDUCED DIARRHEA: ICD-10-CM

## 2022-02-16 RX ORDER — OXYCODONE AND ACETAMINOPHEN 7.5; 325 MG/1; MG/1
1 TABLET ORAL 2 TIMES DAILY PRN
Qty: 70 TABLET | Refills: 0 | Status: SHIPPED | OUTPATIENT
Start: 2022-02-16 | End: 2022-03-17 | Stop reason: SDUPTHER

## 2022-02-16 NOTE — TELEPHONE ENCOUNTER
From: Jillian Garcia  To: Dr. Darryl Hays  Sent: 2/16/2022 4:18 PM EST  Subject: Labs    I tried yesterday and today to get labs. Both times she tried said she got a flash back then no blood came out. Should I be concerned. Tech always have issues finding a vein and when Ive need IVs started since chemo and radiation they have to use an ultrasound machine. What are your thoughts?

## 2022-03-07 DIAGNOSIS — C50.411 MALIGNANT NEOPLASM OF UPPER-OUTER QUADRANT OF RIGHT BREAST IN FEMALE, ESTROGEN RECEPTOR POSITIVE (HCC): ICD-10-CM

## 2022-03-07 DIAGNOSIS — Z17.0 MALIGNANT NEOPLASM OF UPPER-OUTER QUADRANT OF RIGHT BREAST IN FEMALE, ESTROGEN RECEPTOR POSITIVE (HCC): ICD-10-CM

## 2022-03-07 DIAGNOSIS — T45.1X5A CHEMOTHERAPY-INDUCED PERIPHERAL NEUROPATHY (HCC): ICD-10-CM

## 2022-03-07 DIAGNOSIS — G62.0 CHEMOTHERAPY-INDUCED PERIPHERAL NEUROPATHY (HCC): ICD-10-CM

## 2022-03-07 DIAGNOSIS — L27.1 CHEMOTHERAPY-INDUCED ACRAL ERYTHEMA: ICD-10-CM

## 2022-03-07 RX ORDER — ATORVASTATIN CALCIUM 10 MG/1
TABLET, FILM COATED ORAL
Qty: 90 TABLET | Refills: 0 | Status: SHIPPED | OUTPATIENT
Start: 2022-03-07 | End: 2022-06-06

## 2022-03-07 RX ORDER — OXYCODONE AND ACETAMINOPHEN 10; 325 MG/1; MG/1
1 TABLET ORAL DAILY
Qty: 30 TABLET | Refills: 0 | Status: SHIPPED | OUTPATIENT
Start: 2022-03-07 | End: 2022-04-03 | Stop reason: SDUPTHER

## 2022-03-10 NOTE — PROGRESS NOTES
Advised pt - verbalized understanding  Done  Angeli Initial Nutrition Assessment    Joanna Estrada is a 46 y.o.  female     Reason for Evaluation: PGSGA score 6    Subjective/Current Data:  Pt reports started trying to choose healthier meals, more fruits and vegetables. Pt indicates anxiety likely playing a role in eating less, but generally is feeling fine. States slept poorly last night r/t anxious about first chemotherapy. Pt asked about healthy meal recipes. RD provided pt with a cookbook. Pt states she has great support at home. Feeling well, appears well nourished. Past Medical History:  Past Medical History:   Diagnosis Date    Anxiety     Asthma     Depression     Headache     Hyperlipidemia     Hypertension     Malignant neoplasm of upper-outer quadrant of breast in female, estrogen receptor positive ([address] 8/7/2018     Past Surgical History:   Procedure Laterality Date    BLADDER SURGERY  2000    BREAST SURGERY      cyst removed left breast    PARTIAL HYSTERECTOMY      [place] INSJ PRPH CTR VAD W/SUBQ PORT AGE 5 YR/> Left 8/13/2018    PORT INSERTION WITH US AND FLUORO performed by Karine Smith MD at [facility]-OOPHORECTOMY Left     ectopic pregnancy treated by Dr. Reese Sheikh TUNNELED VENOUS PORT PLACEMENT Left 08/13/2018    chemo port left chest        Anthropometric Measures:  Current Weight: Weight: 161 lb 6.4 oz (73.2 kg)   Ideal Body Weight: 52.3 kg  Ideal Body Weight %: 140%  Body mass index is 28.59 kg/m².  which is classified as Overweight      Nutritional Requirements:  Estimated Energy Needs:  Weight Used: 73.2kg   Method Used: [address] [employer]  Estimated kcal Needs: 1800 kcal per day  Protein Needs:  Weight used: 73.2 kg  1.0-1.2 g/kg = 73-87 g per day    Nutrition-focused Physical Findings   GI symptoms: negative  Skin:  Intact    Nutrition Diagnosis and Goal  Problem:  Increased protein needs  Etiology/related to: catabolic illness  Symptoms/Signs/as evidenced by: breast ca with chemo, future sx       Goal: Adequate intake to aide in wt maintenaince, signs and symptoms management  Progress towards goal: unchanged  Education Needs: none at present time     Malnutrition Assessment  · Pt does not meet AND/ASPEN malnutrition guidelines at this time. Per AND/ASPEN guidelines, will monitor for additional RD, RN and MD documentation re weight status, nutritional intake, fluid accumulation, possible loss of body fat or muscle mass, or possible reduced  strength. NUTRITION RECOMMENDATIONS / MONITORING / EVALUATION  1. Encourage adequate protein intake, diet high in fruits, vegetables, lean meats. 2. Will monitor wts, labs, s/s management.     Payton Vazquez RD, LD  Registered Dietitian  [facility]     Office: [phone]  Cell: [phone]

## 2022-03-17 DIAGNOSIS — T45.1X5A CHEMOTHERAPY INDUCED DIARRHEA: ICD-10-CM

## 2022-03-17 DIAGNOSIS — L27.1 CHEMOTHERAPY-INDUCED ACRAL ERYTHEMA: ICD-10-CM

## 2022-03-17 DIAGNOSIS — K52.1 CHEMOTHERAPY INDUCED DIARRHEA: ICD-10-CM

## 2022-03-17 RX ORDER — OXYCODONE AND ACETAMINOPHEN 7.5; 325 MG/1; MG/1
1 TABLET ORAL 2 TIMES DAILY PRN
Qty: 60 TABLET | Refills: 0 | Status: SHIPPED | OUTPATIENT
Start: 2022-03-17 | End: 2022-04-13 | Stop reason: SDUPTHER

## 2022-03-24 ENCOUNTER — OFFICE VISIT (OUTPATIENT)
Dept: FAMILY MEDICINE CLINIC | Age: 57
End: 2022-03-24
Payer: COMMERCIAL

## 2022-03-24 VITALS
OXYGEN SATURATION: 94 % | TEMPERATURE: 98.9 F | SYSTOLIC BLOOD PRESSURE: 135 MMHG | HEART RATE: 99 BPM | WEIGHT: 185.6 LBS | DIASTOLIC BLOOD PRESSURE: 87 MMHG | BODY MASS INDEX: 32.88 KG/M2

## 2022-03-24 DIAGNOSIS — G62.0 CHEMOTHERAPY-INDUCED PERIPHERAL NEUROPATHY (HCC): Primary | ICD-10-CM

## 2022-03-24 DIAGNOSIS — T45.1X5A CHEMOTHERAPY-INDUCED PERIPHERAL NEUROPATHY (HCC): Primary | ICD-10-CM

## 2022-03-24 DIAGNOSIS — C50.411 MALIGNANT NEOPLASM OF UPPER-OUTER QUADRANT OF RIGHT BREAST IN FEMALE, ESTROGEN RECEPTOR POSITIVE (HCC): ICD-10-CM

## 2022-03-24 DIAGNOSIS — Z11.59 NEED FOR HEPATITIS C SCREENING TEST: ICD-10-CM

## 2022-03-24 DIAGNOSIS — Z13.220 ENCOUNTER FOR LIPID SCREENING FOR CARDIOVASCULAR DISEASE: ICD-10-CM

## 2022-03-24 DIAGNOSIS — Z13.6 ENCOUNTER FOR LIPID SCREENING FOR CARDIOVASCULAR DISEASE: ICD-10-CM

## 2022-03-24 DIAGNOSIS — Z17.0 MALIGNANT NEOPLASM OF UPPER-OUTER QUADRANT OF RIGHT BREAST IN FEMALE, ESTROGEN RECEPTOR POSITIVE (HCC): ICD-10-CM

## 2022-03-24 PROCEDURE — 99214 OFFICE O/P EST MOD 30 MIN: CPT | Performed by: FAMILY MEDICINE

## 2022-03-24 NOTE — PROGRESS NOTES
General FM note    Eloisa Rg is a 61 y.o. female who presents today for follow up on her  medical conditions as noted below. Eloisa Rg is c/o of   Chief Complaint   Patient presents with    Results    Other     drawing labs problem       Patient Active Problem List:     H/O severe sun exposure     Chronic tension-type headache, intractable     Essential hypertension     Microscopic hematuria     Lower abdominal pain     Malignant neoplasm of upper-outer quadrant of breast in female, estrogen receptor positive ([address]     Chemotherapy induced diarrhea     Chemotherapy-induced acral erythema     Past Medical History:   Diagnosis Date    Anxiety     Asthma     Depression     Headache     Hyperlipidemia     Hypertension     Malignant neoplasm of upper-outer quadrant of breast in female, estrogen receptor positive ([address] 8/7/2018      Past Surgical History:   Procedure Laterality Date    BLADDER SURGERY  2000    BREAST SURGERY      cyst removed left breast    PARTIAL HYSTERECTOMY      [place] INSJ PRPH CTR VAD W/SUBQ PORT AGE 5 YR/> Left 8/13/2018    PORT INSERTION WITH US AND FLUORO performed by Nury Ceja MD at [address] [facility] [place]-OOPHORECTOMY Left     ectopic pregnancy treated by Dr. Carey Joe TUNNELED VENOUS PORT PLACEMENT Left 08/13/2018    chemo port left chest     Family History   Problem Relation Age of Onset    Arthritis Mother         rheumatoid    Cancer Father         lung cancer    Other Father         circulatory issues    Breast Cancer Paternal Grandmother     Cancer Sister         skin cancer    Colon Cancer Other      Current Outpatient Medications   Medication Sig Dispense Refill    oxyCODONE-acetaminophen (PERCOCET) 7.5-325 MG per tablet Take 1 tablet by mouth 2 times daily as needed for Pain for up to 30 doses.  Intended supply: 30 days 60 tablet 0    atorvastatin (LIPITOR) 10 MG tablet take 1 tablet by mouth once daily 90 tablet 0    oxyCODONE-acetaminophen (ENDOCET)  MG per tablet Take 1 tablet by mouth daily for 30 days. Intended supply: 30 days 30 tablet 0    DULoxetine (CYMBALTA) 60 MG extended release capsule take 1 capsule by mouth daily 30 capsule 2    DULoxetine (CYMBALTA) 30 MG extended release capsule take 1 capsule by mouth once daily 90 capsule 1    albuterol sulfate  (90 Base) MCG/ACT inhaler INHALE 2 PUFFS BY MOUTH 4 TIMES DAILY IF NEEDED, 18 g 3    ciprofloxacin-dexamethasone (CIPRODEX) 0.3-0.1 % otic suspension instill 4 drops into each ear twice a day 7.5 mL 0    anastrozole (ARIMIDEX) 1 MG tablet       fluticasone (FLONASE) 50 MCG/ACT nasal spray 2 sprays by Each Nostril route daily 1 Bottle 5    rOPINIRole (REQUIP) 0.25 MG tablet Take 1 tablet by mouth 3 times daily 90 tablet 3    prochlorperazine (COMPAZINE) 10 MG tablet Take 10 mg by mouth every 6 hours as needed      tacrolimus (PROTOPIC) 0.1 % ointment Apply to rash on face twice daily 30 g 2    triamcinolone (KENALOG) 0.1 % cream APPLY TO RASH ON BODY TWICE DAILY (NOT FACE, ARMPIT OR GROIN) (Patient not taking: Reported on 3/23/2021) 80 g 1    valACYclovir (VALTREX) 1 g tablet Take 1 tablet by mouth 2 times daily (Patient not taking: Reported on 3/23/2021) 20 tablet 3    SUMAtriptan (IMITREX) 20 MG/ACT nasal spray 1 spray by Nasal route daily as needed for Migraine (Patient not taking: Reported on 12/10/2020) 1 Inhaler 3    SUMAtriptan (IMITREX) 100 MG tablet Take 1 tablet by mouth once as needed for Migraine 9 tablet 3    triamcinolone acetonide (KENALOG) 0.1 % paste Apply to teeth 2 times daily.  (Patient not taking: Reported on 12/10/2020) 5 g 1    furosemide (LASIX) 20 MG tablet Take 1 tablet by mouth daily (Patient not taking: Reported on 12/10/2020) 10 tablet 0    lidocaine (LMX) 4 % cream Apply thin layer to hands 30 minutes prior to showering (Patient not taking: Reported on 12/10/2020) 45 g 1    lidocaine-prilocaine (EMLA) 2.5-2.5 % cream Apply topically Daily as needed. (Patient not taking: Reported on 3/23/2021) 1 Tube 1     No current facility-administered medications for this visit. ALLERGIES:    Allergies   Allergen Reactions    Dye [Iodides] Anaphylaxis    Eggs Or Egg-Derived Products Anaphylaxis    Shellfish-Derived Products Anaphylaxis, Shortness Of Breath and Swelling       Social History     Tobacco Use    Smoking status: Current Every Day Smoker     Packs/day: 1.00     Years: 40.00     Pack years: 40.00     Types: Cigarettes    Smokeless tobacco: Never Used   Substance Use Topics    Alcohol use: No     Alcohol/week: 0.0 standard drinks     Comment: social      Body mass index is 32.88 kg/m². /87   Pulse 99   Temp 98.9 °F (37.2 °C)   Wt 185 lb 9.6 oz (84.2 kg)   SpO2 94%   BMI 32.88 kg/m²     Subjective:      HPI    61 y.o. female coming today for follow-up. She states that she is very thankful for the pain medication because it has been helping. She is on a high dose of narcotics due to her metastatic ductal carcinoma right breast. Emma Ala post metastatic ductal carcinoma right with treatment of neoadjuvant chemotherapy radiation. July 2018. Due to the chemotherapy patient has been experiencing increased pain discomfort/neuropathy in her hands feet. Constant pain even due to radiation at the right axilla area. Patient states that the phlebotomist have a hard time getting blood draws from her at her left arm. She states she really does not know what to do. She also wanted to we will check her result of the CT abdomen which was done just recently. She was found to have a bile duct enlargement of 6 mm. Review of Systems   Constitutional: Negative for fever and unexpected weight change. Pertinent items are noted in HPI. Objective:   Physical Exam  Constitutional: VS (see above). General appearance: normal development, habitus and attention, no deformities. No distress.   Eyes: normal conjunctiva and lids. CAV: RRR, no RMG. No edema lower extremities. Pulmo: CTA bilateral, no CWR. Skin: no rashes, lesions or ulcers. Musculoskeletal: normal gait. Nails: no clubbing or cyanosis. Compressible sleeve at the right arm. Psychiatric: alert and oriented to place, time and person. Normal mood and affect. Assessment:       Diagnosis Orders   1. Chemotherapy-induced peripheral neuropathy (HCC)  Comprehensive Metabolic Panel    CBC with Auto Differential    Vitamin D 25 Hydroxy   2. Malignant neoplasm of upper-outer quadrant of right breast in female, estrogen receptor positive ([facility] [address]  Comprehensive Metabolic Panel    CBC with Auto Differential    Vitamin D 25 Hydroxy   3. Encounter for lipid screening for cardiovascular disease  Lipid Panel   4. Need for hepatitis C screening test  Hepatitis C Antibody       Plan:   Overall the patient is doing quite well. I discussed with her to use a warm water bath rapidly following electron hopefully this will help. Await results of the blood work. If her blood work is abnormal especially liver function AP and she needs to follow-up with the additional imaging of the follow-up. We will continue current care especially with the high dose of pain medication as nothing else will give her some relief of her cancer treatment induced overall pain. Return in about 3 months (around 6/24/2022), or if symptoms worsen or fail to improve.   Orders Placed This Encounter   Procedures    Comprehensive Metabolic Panel     Standing Status:   Future     Standing Expiration Date:   3/24/2023    CBC with Auto Differential     Standing Status:   Future     Standing Expiration Date:   3/24/2023    Hepatitis C Antibody     Standing Status:   Future     Standing Expiration Date:   3/24/2023    Lipid Panel     Standing Status:   Future     Standing Expiration Date:   3/24/2023     Order Specific Question:   Is Patient Fasting?/# of Hours     Answer:   yes    Vitamin D 25 Hydroxy     Standing Status:   Future     Standing Expiration Date:   3/24/2023     No orders of the defined types were placed in this encounter. Call or return to clinic prn if these symptoms worsen or fail to improve as anticipated. I have reviewed the instructions with the patient, answering all questions to patient's satisfaction. Marsha Sylvester received counseling on the following healthy behaviors: nutrition, exercise, and medication adherence  Reviewed prior labs and health maintenance. Continue current medications, diet and exercise. Discussed use, benefit, and side effects of prescribed medications. Barriers to medication compliance addressed. Patient given educational materials - see patient instructions. All patient questions answered. Patient voiced understanding.       Electronically signed by Jenna Almazan MD on 3/25/2022 at 6:11 AM       (Please note that portions of this note were completed with a voice recognition program. Efforts were made to edit the dictations but occasionally words are mis-transcribed.)

## 2022-04-03 DIAGNOSIS — L27.1 CHEMOTHERAPY-INDUCED ACRAL ERYTHEMA: ICD-10-CM

## 2022-04-03 DIAGNOSIS — G62.0 CHEMOTHERAPY-INDUCED PERIPHERAL NEUROPATHY (HCC): ICD-10-CM

## 2022-04-03 DIAGNOSIS — C50.411 MALIGNANT NEOPLASM OF UPPER-OUTER QUADRANT OF RIGHT BREAST IN FEMALE, ESTROGEN RECEPTOR POSITIVE (HCC): ICD-10-CM

## 2022-04-03 DIAGNOSIS — T45.1X5A CHEMOTHERAPY-INDUCED PERIPHERAL NEUROPATHY (HCC): ICD-10-CM

## 2022-04-03 DIAGNOSIS — Z17.0 MALIGNANT NEOPLASM OF UPPER-OUTER QUADRANT OF RIGHT BREAST IN FEMALE, ESTROGEN RECEPTOR POSITIVE (HCC): ICD-10-CM

## 2022-04-04 RX ORDER — OXYCODONE AND ACETAMINOPHEN 10; 325 MG/1; MG/1
1 TABLET ORAL DAILY
Qty: 30 TABLET | Refills: 0 | Status: SHIPPED | OUTPATIENT
Start: 2022-04-04 | End: 2022-05-01 | Stop reason: SDUPTHER

## 2022-04-07 DIAGNOSIS — Z17.0 MALIGNANT NEOPLASM OF LOWER-OUTER QUADRANT OF RIGHT BREAST OF FEMALE, ESTROGEN RECEPTOR POSITIVE (HCC): ICD-10-CM

## 2022-04-07 DIAGNOSIS — C50.511 MALIGNANT NEOPLASM OF LOWER-OUTER QUADRANT OF RIGHT BREAST OF FEMALE, ESTROGEN RECEPTOR POSITIVE (HCC): ICD-10-CM

## 2022-04-07 DIAGNOSIS — G44.221 CHRONIC TENSION-TYPE HEADACHE, INTRACTABLE: ICD-10-CM

## 2022-04-07 RX ORDER — DULOXETIN HYDROCHLORIDE 60 MG/1
60 CAPSULE, DELAYED RELEASE ORAL DAILY
Qty: 30 CAPSULE | Refills: 2 | Status: SHIPPED | OUTPATIENT
Start: 2022-04-07 | End: 2022-07-06

## 2022-04-07 RX ORDER — DULOXETIN HYDROCHLORIDE 30 MG/1
CAPSULE, DELAYED RELEASE ORAL
Qty: 90 CAPSULE | Refills: 1 | Status: SHIPPED | OUTPATIENT
Start: 2022-04-07 | End: 2022-09-29

## 2022-04-13 DIAGNOSIS — K52.1 CHEMOTHERAPY INDUCED DIARRHEA: ICD-10-CM

## 2022-04-13 DIAGNOSIS — L27.1 CHEMOTHERAPY-INDUCED ACRAL ERYTHEMA: ICD-10-CM

## 2022-04-13 DIAGNOSIS — T45.1X5A CHEMOTHERAPY INDUCED DIARRHEA: ICD-10-CM

## 2022-04-13 RX ORDER — OXYCODONE AND ACETAMINOPHEN 7.5; 325 MG/1; MG/1
1 TABLET ORAL 2 TIMES DAILY PRN
Qty: 60 TABLET | Refills: 0 | Status: SHIPPED | OUTPATIENT
Start: 2022-04-13 | End: 2022-05-11 | Stop reason: SDUPTHER

## 2022-04-13 NOTE — TELEPHONE ENCOUNTER
Dot Pasquale is calling to request a refill on the following medication(s):    Last Visit Date (If Applicable):  Visit date not found    Next Visit Date:    Visit date not found    Medication Request:  Requested Prescriptions     Pending Prescriptions Disp Refills    oxyCODONE-acetaminophen (PERCOCET) 7.5-325 MG per tablet 60 tablet 0     Sig: Take 1 tablet by mouth 2 times daily as needed for Pain for up to 30 doses.  Intended supply: 30 days

## 2022-05-01 DIAGNOSIS — Z17.0 MALIGNANT NEOPLASM OF UPPER-OUTER QUADRANT OF RIGHT BREAST IN FEMALE, ESTROGEN RECEPTOR POSITIVE (HCC): ICD-10-CM

## 2022-05-01 DIAGNOSIS — C50.411 MALIGNANT NEOPLASM OF UPPER-OUTER QUADRANT OF RIGHT BREAST IN FEMALE, ESTROGEN RECEPTOR POSITIVE (HCC): ICD-10-CM

## 2022-05-01 DIAGNOSIS — L27.1 CHEMOTHERAPY-INDUCED ACRAL ERYTHEMA: ICD-10-CM

## 2022-05-01 DIAGNOSIS — T45.1X5A CHEMOTHERAPY-INDUCED PERIPHERAL NEUROPATHY (HCC): ICD-10-CM

## 2022-05-01 DIAGNOSIS — G62.0 CHEMOTHERAPY-INDUCED PERIPHERAL NEUROPATHY (HCC): ICD-10-CM

## 2022-05-02 RX ORDER — OXYCODONE AND ACETAMINOPHEN 10; 325 MG/1; MG/1
1 TABLET ORAL DAILY
Qty: 30 TABLET | Refills: 0 | Status: SHIPPED | OUTPATIENT
Start: 2022-05-02 | End: 2022-05-28 | Stop reason: SDUPTHER

## 2022-05-10 LAB
ALBUMIN SERPL-MCNC: NORMAL G/DL
ALP BLD-CCNC: NORMAL U/L
ALT SERPL-CCNC: NORMAL U/L
ANION GAP SERPL CALCULATED.3IONS-SCNC: NORMAL MMOL/L
ANTIBODY: NON REACTIVE
ANTIBODY: NORMAL
AST SERPL-CCNC: NORMAL U/L
BASOPHILS ABSOLUTE: NORMAL
BASOPHILS RELATIVE PERCENT: NORMAL
BILIRUB SERPL-MCNC: NORMAL MG/DL
BUN BLDV-MCNC: NORMAL MG/DL
CALCIUM SERPL-MCNC: NORMAL MG/DL
CHLORIDE BLD-SCNC: NORMAL MMOL/L
CO2: NORMAL
CREAT SERPL-MCNC: NORMAL MG/DL
EOSINOPHILS ABSOLUTE: NORMAL
EOSINOPHILS RELATIVE PERCENT: NORMAL
GFR CALCULATED: NORMAL
GLUCOSE BLD-MCNC: NORMAL MG/DL
HCT VFR BLD CALC: NORMAL %
HEMOGLOBIN: NORMAL
LYMPHOCYTES ABSOLUTE: NORMAL
LYMPHOCYTES RELATIVE PERCENT: NORMAL
MCH RBC QN AUTO: NORMAL PG
MCHC RBC AUTO-ENTMCNC: NORMAL G/DL
MCV RBC AUTO: NORMAL FL
MONOCYTES ABSOLUTE: NORMAL
MONOCYTES RELATIVE PERCENT: NORMAL
NEUTROPHILS ABSOLUTE: NORMAL
NEUTROPHILS RELATIVE PERCENT: NORMAL
PDW BLD-RTO: NORMAL %
PLATELET # BLD: NORMAL 10*3/UL
PMV BLD AUTO: NORMAL FL
POTASSIUM SERPL-SCNC: NORMAL MMOL/L
RBC # BLD: NORMAL 10*6/UL
SODIUM BLD-SCNC: 140 MMOL/L
TOTAL PROTEIN: NORMAL
VITAMIN D 25-HYDROXY: 25.7
VITAMIN D2, 25 HYDROXY: NORMAL
VITAMIN D3,25 HYDROXY: NORMAL
WBC # BLD: 5.4 10^3/ML

## 2022-05-11 DIAGNOSIS — L27.1 CHEMOTHERAPY-INDUCED ACRAL ERYTHEMA: ICD-10-CM

## 2022-05-11 DIAGNOSIS — T45.1X5A CHEMOTHERAPY INDUCED DIARRHEA: ICD-10-CM

## 2022-05-11 DIAGNOSIS — K52.1 CHEMOTHERAPY INDUCED DIARRHEA: ICD-10-CM

## 2022-05-12 DIAGNOSIS — Z11.59 NEED FOR HEPATITIS C SCREENING TEST: ICD-10-CM

## 2022-05-12 DIAGNOSIS — T45.1X5A CHEMOTHERAPY-INDUCED PERIPHERAL NEUROPATHY (HCC): ICD-10-CM

## 2022-05-12 DIAGNOSIS — G62.0 CHEMOTHERAPY-INDUCED PERIPHERAL NEUROPATHY (HCC): ICD-10-CM

## 2022-05-12 DIAGNOSIS — C50.411 MALIGNANT NEOPLASM OF UPPER-OUTER QUADRANT OF RIGHT BREAST IN FEMALE, ESTROGEN RECEPTOR POSITIVE (HCC): ICD-10-CM

## 2022-05-12 DIAGNOSIS — Z17.0 MALIGNANT NEOPLASM OF UPPER-OUTER QUADRANT OF RIGHT BREAST IN FEMALE, ESTROGEN RECEPTOR POSITIVE (HCC): ICD-10-CM

## 2022-05-12 RX ORDER — OXYCODONE AND ACETAMINOPHEN 7.5; 325 MG/1; MG/1
1 TABLET ORAL 2 TIMES DAILY PRN
Qty: 60 TABLET | Refills: 0 | Status: SHIPPED | OUTPATIENT
Start: 2022-05-12 | End: 2022-06-09 | Stop reason: SDUPTHER

## 2022-05-13 ENCOUNTER — TELEPHONE (OUTPATIENT)
Dept: FAMILY MEDICINE CLINIC | Age: 57
End: 2022-05-13

## 2022-05-13 NOTE — TELEPHONE ENCOUNTER
misael stated that she needs something sen to her pharm stating that she takes nboth percocet and Endocet.     She stated for the endocet they are telling her she should have more but she is stating its due for refill on 5/15

## 2022-05-13 NOTE — TELEPHONE ENCOUNTER
The patient has refilled the Percocet 10/325 on 05/03/2022. But the patient also additionally takes the oxycodone 7.5/325 mg. Refill date is 5/15/2022. This patient needs to have this pain medication. Please let me know if you need to call pharmacy. Thank you.

## 2022-05-28 DIAGNOSIS — L27.1 CHEMOTHERAPY-INDUCED ACRAL ERYTHEMA: ICD-10-CM

## 2022-05-28 DIAGNOSIS — Z17.0 MALIGNANT NEOPLASM OF UPPER-OUTER QUADRANT OF RIGHT BREAST IN FEMALE, ESTROGEN RECEPTOR POSITIVE (HCC): ICD-10-CM

## 2022-05-28 DIAGNOSIS — G62.0 CHEMOTHERAPY-INDUCED PERIPHERAL NEUROPATHY (HCC): ICD-10-CM

## 2022-05-28 DIAGNOSIS — C50.411 MALIGNANT NEOPLASM OF UPPER-OUTER QUADRANT OF RIGHT BREAST IN FEMALE, ESTROGEN RECEPTOR POSITIVE (HCC): ICD-10-CM

## 2022-05-28 DIAGNOSIS — T45.1X5A CHEMOTHERAPY-INDUCED PERIPHERAL NEUROPATHY (HCC): ICD-10-CM

## 2022-05-31 RX ORDER — OXYCODONE AND ACETAMINOPHEN 10; 325 MG/1; MG/1
1 TABLET ORAL DAILY
Qty: 30 TABLET | Refills: 0 | Status: SHIPPED | OUTPATIENT
Start: 2022-05-31 | End: 2022-06-24 | Stop reason: SDUPTHER

## 2022-05-31 NOTE — TELEPHONE ENCOUNTER
Tanika Zelaya is calling to request a refill on the following medication(s):    Last Visit Date (If Applicable):  8/67/6338    Next Visit Date:    Visit date not found    Medication Request:  Requested Prescriptions     Pending Prescriptions Disp Refills    oxyCODONE-acetaminophen (ENDOCET)  MG per tablet 30 tablet 0     Sig: Take 1 tablet by mouth daily for 30 days.  Intended supply: 30 days

## 2022-06-03 ENCOUNTER — PATIENT MESSAGE (OUTPATIENT)
Dept: FAMILY MEDICINE CLINIC | Age: 57
End: 2022-06-03

## 2022-06-06 RX ORDER — ATORVASTATIN CALCIUM 10 MG/1
TABLET, FILM COATED ORAL
Qty: 90 TABLET | Refills: 0 | Status: SHIPPED | OUTPATIENT
Start: 2022-06-06 | End: 2022-09-06

## 2022-06-06 NOTE — TELEPHONE ENCOUNTER
From: Jimy Chand  To: Dr. Jordan Vallecillo  Sent: 6/3/2022 1:17 PM EDT  Subject: CT Scan    Hi, did you get a copy of the CT scan from last Friday? [facility] office won't call me back because Marisela Vergara is on rounds. They set me an appointment for June 21 but now I have to wait and my mind won't stop. Can you tell me what is says? They say it's protocol to be seen to go over test results, but they called me with my Dexa results. Sorry, I'm in tears worrying.

## 2022-06-09 DIAGNOSIS — T45.1X5A CHEMOTHERAPY INDUCED DIARRHEA: ICD-10-CM

## 2022-06-09 DIAGNOSIS — L27.1 CHEMOTHERAPY-INDUCED ACRAL ERYTHEMA: ICD-10-CM

## 2022-06-09 DIAGNOSIS — K52.1 CHEMOTHERAPY INDUCED DIARRHEA: ICD-10-CM

## 2022-06-10 RX ORDER — OXYCODONE AND ACETAMINOPHEN 7.5; 325 MG/1; MG/1
1 TABLET ORAL 2 TIMES DAILY PRN
Qty: 60 TABLET | Refills: 0 | Status: SHIPPED | OUTPATIENT
Start: 2022-06-10 | End: 2022-07-06 | Stop reason: SDUPTHER

## 2022-06-24 DIAGNOSIS — C50.411 MALIGNANT NEOPLASM OF UPPER-OUTER QUADRANT OF RIGHT BREAST IN FEMALE, ESTROGEN RECEPTOR POSITIVE (HCC): ICD-10-CM

## 2022-06-24 DIAGNOSIS — Z17.0 MALIGNANT NEOPLASM OF UPPER-OUTER QUADRANT OF RIGHT BREAST IN FEMALE, ESTROGEN RECEPTOR POSITIVE (HCC): ICD-10-CM

## 2022-06-24 DIAGNOSIS — T45.1X5A CHEMOTHERAPY-INDUCED PERIPHERAL NEUROPATHY (HCC): ICD-10-CM

## 2022-06-24 DIAGNOSIS — G62.0 CHEMOTHERAPY-INDUCED PERIPHERAL NEUROPATHY (HCC): ICD-10-CM

## 2022-06-24 DIAGNOSIS — L27.1 CHEMOTHERAPY-INDUCED ACRAL ERYTHEMA: ICD-10-CM

## 2022-06-24 NOTE — TELEPHONE ENCOUNTER
Ravin Chase is calling to request a refill on the following medication(s):    Last Visit Date (If Applicable):  1/57/5441    Next Visit Date:    Visit date not found    Medication Request:  Requested Prescriptions     Pending Prescriptions Disp Refills    oxyCODONE-acetaminophen (ENDOCET)  MG per tablet 30 tablet 0     Sig: Take 1 tablet by mouth daily for 30 days.  Intended supply: 30 days

## 2022-06-27 RX ORDER — OXYCODONE AND ACETAMINOPHEN 10; 325 MG/1; MG/1
1 TABLET ORAL DAILY
Qty: 30 TABLET | Refills: 0 | Status: SHIPPED | OUTPATIENT
Start: 2022-06-27 | End: 2022-07-27 | Stop reason: SDUPTHER

## 2022-07-06 DIAGNOSIS — C50.511 MALIGNANT NEOPLASM OF LOWER-OUTER QUADRANT OF RIGHT BREAST OF FEMALE, ESTROGEN RECEPTOR POSITIVE (HCC): ICD-10-CM

## 2022-07-06 DIAGNOSIS — L27.1 CHEMOTHERAPY-INDUCED ACRAL ERYTHEMA: ICD-10-CM

## 2022-07-06 DIAGNOSIS — K52.1 CHEMOTHERAPY INDUCED DIARRHEA: ICD-10-CM

## 2022-07-06 DIAGNOSIS — Z17.0 MALIGNANT NEOPLASM OF LOWER-OUTER QUADRANT OF RIGHT BREAST OF FEMALE, ESTROGEN RECEPTOR POSITIVE (HCC): ICD-10-CM

## 2022-07-06 DIAGNOSIS — T45.1X5A CHEMOTHERAPY INDUCED DIARRHEA: ICD-10-CM

## 2022-07-06 DIAGNOSIS — G44.221 CHRONIC TENSION-TYPE HEADACHE, INTRACTABLE: ICD-10-CM

## 2022-07-06 RX ORDER — DULOXETIN HYDROCHLORIDE 60 MG/1
60 CAPSULE, DELAYED RELEASE ORAL DAILY
Qty: 30 CAPSULE | Refills: 2 | Status: SHIPPED | OUTPATIENT
Start: 2022-07-06 | End: 2022-10-06

## 2022-07-06 RX ORDER — OXYCODONE AND ACETAMINOPHEN 7.5; 325 MG/1; MG/1
1 TABLET ORAL 2 TIMES DAILY PRN
Qty: 60 TABLET | Refills: 0 | Status: SHIPPED | OUTPATIENT
Start: 2022-07-06 | End: 2022-07-09 | Stop reason: SDUPTHER

## 2022-07-06 NOTE — TELEPHONE ENCOUNTER
Tessa Sandoval is calling to request a refill on the following medication(s):    Last Visit Date (If Applicable):  1/30/9001    Next Visit Date:    Visit date not found    Medication Request:  Requested Prescriptions     Pending Prescriptions Disp Refills    DULoxetine (CYMBALTA) 60 MG extended release capsule [Pharmacy Med Name: DULOXETINE HCL DR 60 MG CAP] 30 capsule 2     Sig: take 1 capsule by mouth daily

## 2022-07-08 ENCOUNTER — TELEPHONE (OUTPATIENT)
Dept: FAMILY MEDICINE CLINIC | Age: 57
End: 2022-07-08

## 2022-07-08 ENCOUNTER — PATIENT MESSAGE (OUTPATIENT)
Dept: FAMILY MEDICINE CLINIC | Age: 57
End: 2022-07-08

## 2022-07-08 DIAGNOSIS — T45.1X5A CHEMOTHERAPY INDUCED DIARRHEA: Primary | ICD-10-CM

## 2022-07-08 DIAGNOSIS — K52.1 CHEMOTHERAPY INDUCED DIARRHEA: Primary | ICD-10-CM

## 2022-07-08 DIAGNOSIS — L27.1 CHEMOTHERAPY-INDUCED ACRAL ERYTHEMA: ICD-10-CM

## 2022-07-08 NOTE — TELEPHONE ENCOUNTER
Patient called the office on 7/8/22 and stated that the pharmacy would fill her script until 7/14/22. Patient stated that the pharmacy will fill before that time if a verbal is called in from the office.  Please advise

## 2022-07-09 RX ORDER — OXYCODONE AND ACETAMINOPHEN 7.5; 325 MG/1; MG/1
1 TABLET ORAL 2 TIMES DAILY PRN
Qty: 60 TABLET | Refills: 0 | Status: SHIPPED | OUTPATIENT
Start: 2022-07-09 | End: 2022-08-05 | Stop reason: SDUPTHER

## 2022-07-11 NOTE — TELEPHONE ENCOUNTER
From: Ravin Chase  To: Dr. Rachid Quintanilla  Sent: 7/8/2022 4:07 PM EDT  Subject: [facility]    Hi, I picked up my 7.5 on June 10th. [facility] is saying they cant fill until the 14th. Can you give them a call please.

## 2022-07-27 DIAGNOSIS — L27.1 CHEMOTHERAPY-INDUCED ACRAL ERYTHEMA: ICD-10-CM

## 2022-07-27 DIAGNOSIS — C50.411 MALIGNANT NEOPLASM OF UPPER-OUTER QUADRANT OF RIGHT BREAST IN FEMALE, ESTROGEN RECEPTOR POSITIVE (HCC): ICD-10-CM

## 2022-07-27 DIAGNOSIS — T45.1X5A CHEMOTHERAPY-INDUCED PERIPHERAL NEUROPATHY (HCC): ICD-10-CM

## 2022-07-27 DIAGNOSIS — Z17.0 MALIGNANT NEOPLASM OF UPPER-OUTER QUADRANT OF RIGHT BREAST IN FEMALE, ESTROGEN RECEPTOR POSITIVE (HCC): ICD-10-CM

## 2022-07-27 DIAGNOSIS — G62.0 CHEMOTHERAPY-INDUCED PERIPHERAL NEUROPATHY (HCC): ICD-10-CM

## 2022-07-27 RX ORDER — OXYCODONE AND ACETAMINOPHEN 10; 325 MG/1; MG/1
1 TABLET ORAL DAILY
Qty: 30 TABLET | Refills: 0 | Status: SHIPPED | OUTPATIENT
Start: 2022-07-27 | End: 2022-08-22 | Stop reason: SDUPTHER

## 2022-07-28 ENCOUNTER — OFFICE VISIT (OUTPATIENT)
Dept: FAMILY MEDICINE CLINIC | Age: 57
End: 2022-07-28
Payer: COMMERCIAL

## 2022-07-28 ENCOUNTER — TELEPHONE (OUTPATIENT)
Dept: FAMILY MEDICINE CLINIC | Age: 57
End: 2022-07-28

## 2022-07-28 VITALS
SYSTOLIC BLOOD PRESSURE: 138 MMHG | HEIGHT: 63 IN | TEMPERATURE: 97.2 F | BODY MASS INDEX: 32.21 KG/M2 | OXYGEN SATURATION: 96 % | HEART RATE: 67 BPM | WEIGHT: 181.8 LBS | DIASTOLIC BLOOD PRESSURE: 88 MMHG

## 2022-07-28 DIAGNOSIS — Z17.0 MALIGNANT NEOPLASM OF LOWER-OUTER QUADRANT OF RIGHT BREAST OF FEMALE, ESTROGEN RECEPTOR POSITIVE (HCC): ICD-10-CM

## 2022-07-28 DIAGNOSIS — G62.0 CHEMOTHERAPY-INDUCED PERIPHERAL NEUROPATHY (HCC): Primary | ICD-10-CM

## 2022-07-28 DIAGNOSIS — C50.511 MALIGNANT NEOPLASM OF LOWER-OUTER QUADRANT OF RIGHT BREAST OF FEMALE, ESTROGEN RECEPTOR POSITIVE (HCC): ICD-10-CM

## 2022-07-28 DIAGNOSIS — T45.1X5A CHEMOTHERAPY-INDUCED PERIPHERAL NEUROPATHY (HCC): Primary | ICD-10-CM

## 2022-07-28 DIAGNOSIS — L27.1 CHEMOTHERAPY-INDUCED ACRAL ERYTHEMA: ICD-10-CM

## 2022-07-28 PROCEDURE — 99213 OFFICE O/P EST LOW 20 MIN: CPT | Performed by: FAMILY MEDICINE

## 2022-07-28 ASSESSMENT — PATIENT HEALTH QUESTIONNAIRE - PHQ9
SUM OF ALL RESPONSES TO PHQ QUESTIONS 1-9: 0
SUM OF ALL RESPONSES TO PHQ9 QUESTIONS 1 & 2: 0
2. FEELING DOWN, DEPRESSED OR HOPELESS: 0
SUM OF ALL RESPONSES TO PHQ QUESTIONS 1-9: 0
1. LITTLE INTEREST OR PLEASURE IN DOING THINGS: 0
SUM OF ALL RESPONSES TO PHQ QUESTIONS 1-9: 0
SUM OF ALL RESPONSES TO PHQ QUESTIONS 1-9: 0

## 2022-07-28 NOTE — PROGRESS NOTES
General FM note    Jerrica Whyte is a 61 y.o. female who presents today for follow up on her  medical conditions as noted below. Jerrica Whyte is c/o of   Chief Complaint   Patient presents with    Check-Up       Patient Active Problem List:     H/O severe sun exposure     Chronic tension-type headache, intractable     Essential hypertension     Microscopic hematuria     Lower abdominal pain     Malignant neoplasm of upper-outer quadrant of breast in female, estrogen receptor positive ([address]     Chemotherapy induced diarrhea     Chemotherapy-induced acral erythema     Past Medical History:   Diagnosis Date    Anxiety     Asthma     Depression     Headache     Hyperlipidemia     Hypertension     Malignant neoplasm of upper-outer quadrant of breast in female, estrogen receptor positive ([address] 8/7/2018      Past Surgical History:   Procedure Laterality Date    BLADDER SURGERY  2000    BREAST SURGERY      cyst removed left breast    PARTIAL HYSTERECTOMY      [place] INSJ PRPH CTR VAD W/SUBQ PORT AGE 5 YR/> Left 8/13/2018    PORT INSERTION WITH US AND FLUORO performed by Kayleen Melendez MD at [facility] Left     ectopic pregnancy treated by Dr. Clarita Hamman      TUNNELED [address] Left 08/13/2018    chemo port left chest     Family History   Problem Relation Age of Onset    Arthritis Mother         rheumatoid    Cancer Father         lung cancer    Other Father         circulatory issues    Breast Cancer Paternal Grandmother     Cancer Sister         skin cancer    Colon Cancer Other      Current Outpatient Medications   Medication Sig Dispense Refill    oxyCODONE-acetaminophen (ENDOCET)  MG per tablet Take 1 tablet by mouth in the morning for 30 days. Intended supply: 30 days. 30 tablet 0    oxyCODONE-acetaminophen (PERCOCET) 7.5-325 MG per tablet Take 1 tablet by mouth 2 times daily as needed for Pain for up to 30 doses.  Intended supply: 30 days 60 tablet 0    DULoxetine Reactions    Dye [Iodides] Anaphylaxis    Eggs Or Egg-Derived Products Anaphylaxis    Shellfish-Derived Products Anaphylaxis, Shortness Of Breath and Swelling       Social History     Tobacco Use    Smoking status: Every Day     Packs/day: 1.00     Years: 40.00     Pack years: 40.00     Types: Cigarettes    Smokeless tobacco: Never   Substance Use Topics    Alcohol use: No     Alcohol/week: 0.0 standard drinks     Comment: social      Body mass index is 32.2 kg/m². /88   Pulse 67   Temp 97.2 °F (36.2 °C)   Ht 5' 3\" (1.6 m)   Wt 181 lb 12.8 oz (82.5 kg)   SpO2 96%   BMI 32.20 kg/m²     Subjective:      HPI    61 y.o. female coming today for follow-up of her pain medications. The patient is status post metastatic ductal right breast cancer in July 2018. She is status post neoadjuvant chemotherapy treatment and radiation in July 2018. Due to the radiation treatment which was started after her mastectomy the patient has been experiencing quite a lot of pain especially at her right chest area right arm. But she also has been experiencing nerve damage especially in her feet. She states that now she at times feels that her feet are very cold that they are very icy that there is different nerve pain present. Her oncologist wants to see her neurologist but she feels that this is more so progression of the nerve disease./Nerve damage. The patient is currently on a Percocet 7.5/325 mg twice a day. She also has a prescription for Percocet 10/325 mg to use if needed. She states when she follows this regimen her pain is okay controlled she still has it but she can deal with it. Review of Systems   Constitutional: Negative for fever and unexpected weight change. Pertinent items are noted in HPI. Objective:   Physical Exam  Constitutional: VS (see above). General appearance: normal development, habitus and attention, no deformities. No distress. Eyes: normal conjunctiva and lids.   CAV: RRR, no RMG. No edema lower extremities. Pulmo: CTA bilateral, no CWR. Skin: no rashes, lesions or ulcers. Musculoskeletal: normal gait. Nails: no clubbing or cyanosis. Psychiatric: alert and oriented to place, time and person. Normal mood and affect. Assessment:       Diagnosis Orders   1. Chemotherapy-induced peripheral neuropathy ([facility] [address]        2. Chemotherapy-induced acral erythema        3. Malignant neoplasm of lower-outer quadrant of right breast of female, estrogen receptor positive ([facility] [address]            Plan: Will continue current care for the patient especially pain medication. I discussed previously with her pain specialist if they could be done any injections but that is discussed with him he felt that a good controlled medicine regiment would be much more beneficial for this patient with this acute severe neuropathy. Return in about 3 months (around 10/28/2022), or if symptoms worsen or fail to improve. No orders of the defined types were placed in this encounter. No orders of the defined types were placed in this encounter. Call or return to clinic prn if these symptoms worsen or fail to improve as anticipated. I have reviewed the instructions with the patient, answering all questions to patient's satisfaction. Philippe Canas received counseling on the following healthy behaviors: nutrition, exercise, and medication adherence  Reviewed prior labs and health maintenance. Continue current medications, diet and exercise. Discussed use, benefit, and side effects of prescribed medications. Barriers to medication compliance addressed. Patient given educational materials - see patient instructions. All patient questions answered. Patient voiced understanding.       Electronically signed by Kingston Boyer MD on 7/29/2022 at 6:53 AM       (Please note that portions of this note were completed with a voice recognition program. Efforts were made to edit the dictations but occasionally words are

## 2022-08-05 DIAGNOSIS — T45.1X5A CHEMOTHERAPY INDUCED DIARRHEA: ICD-10-CM

## 2022-08-05 DIAGNOSIS — K52.1 CHEMOTHERAPY INDUCED DIARRHEA: ICD-10-CM

## 2022-08-05 DIAGNOSIS — L27.1 CHEMOTHERAPY-INDUCED ACRAL ERYTHEMA: ICD-10-CM

## 2022-08-05 RX ORDER — OXYCODONE AND ACETAMINOPHEN 7.5; 325 MG/1; MG/1
1 TABLET ORAL 2 TIMES DAILY PRN
Qty: 60 TABLET | Refills: 0 | Status: SHIPPED | OUTPATIENT
Start: 2022-08-05 | End: 2022-09-02 | Stop reason: SDUPTHER

## 2022-08-16 ENCOUNTER — PATIENT MESSAGE (OUTPATIENT)
Dept: FAMILY MEDICINE CLINIC | Age: 57
End: 2022-08-16

## 2022-08-16 DIAGNOSIS — T45.1X5A CHEMOTHERAPY-INDUCED PERIPHERAL NEUROPATHY (HCC): ICD-10-CM

## 2022-08-16 DIAGNOSIS — K52.1 CHEMOTHERAPY INDUCED DIARRHEA: Primary | ICD-10-CM

## 2022-08-16 DIAGNOSIS — T45.1X5A CHEMOTHERAPY INDUCED DIARRHEA: Primary | ICD-10-CM

## 2022-08-16 DIAGNOSIS — L27.1 CHEMOTHERAPY-INDUCED ACRAL ERYTHEMA: ICD-10-CM

## 2022-08-16 DIAGNOSIS — G62.0 CHEMOTHERAPY-INDUCED PERIPHERAL NEUROPATHY (HCC): ICD-10-CM

## 2022-08-16 RX ORDER — DOXYCYCLINE HYCLATE 100 MG
100 TABLET ORAL 2 TIMES DAILY
Qty: 14 TABLET | Refills: 0 | Status: SHIPPED | OUTPATIENT
Start: 2022-08-16 | End: 2022-08-23

## 2022-08-16 NOTE — TELEPHONE ENCOUNTER
From: Gem Vo  To: Dr. Larisa Peter  Sent: 8/16/2022 9:29 AM EDT  Subject: Rash on ankles     Hi. Can you look at the pictures I sent of the inside of my ankles. At first I thought it was bug bites. I have been putting on antibiotic ointment. It doesnt itch today. Needless to say my nerves in that area are crazy. Should I come in?  Thank you

## 2022-08-22 ENCOUNTER — TELEPHONE (OUTPATIENT)
Dept: FAMILY MEDICINE CLINIC | Age: 57
End: 2022-08-22

## 2022-08-22 DIAGNOSIS — K52.1 CHEMOTHERAPY INDUCED DIARRHEA: ICD-10-CM

## 2022-08-22 DIAGNOSIS — G62.0 CHEMOTHERAPY-INDUCED PERIPHERAL NEUROPATHY (HCC): ICD-10-CM

## 2022-08-22 DIAGNOSIS — L27.1 CHEMOTHERAPY-INDUCED ACRAL ERYTHEMA: ICD-10-CM

## 2022-08-22 DIAGNOSIS — T45.1X5A CHEMOTHERAPY-INDUCED PERIPHERAL NEUROPATHY (HCC): ICD-10-CM

## 2022-08-22 DIAGNOSIS — C50.411 MALIGNANT NEOPLASM OF UPPER-OUTER QUADRANT OF RIGHT BREAST IN FEMALE, ESTROGEN RECEPTOR POSITIVE (HCC): ICD-10-CM

## 2022-08-22 DIAGNOSIS — T45.1X5A CHEMOTHERAPY INDUCED DIARRHEA: ICD-10-CM

## 2022-08-22 DIAGNOSIS — Z17.0 MALIGNANT NEOPLASM OF UPPER-OUTER QUADRANT OF RIGHT BREAST IN FEMALE, ESTROGEN RECEPTOR POSITIVE (HCC): ICD-10-CM

## 2022-08-22 RX ORDER — OXYCODONE AND ACETAMINOPHEN 10; 325 MG/1; MG/1
1 TABLET ORAL DAILY
Qty: 30 TABLET | Refills: 0 | Status: SHIPPED | OUTPATIENT
Start: 2022-08-22 | End: 2022-09-20 | Stop reason: SDUPTHER

## 2022-08-22 RX ORDER — OXYCODONE HCL 10 MG/1
10 TABLET, FILM COATED, EXTENDED RELEASE ORAL 2 TIMES DAILY
Qty: 10 TABLET | Refills: 0 | Status: SHIPPED | OUTPATIENT
Start: 2022-08-22 | End: 2022-08-27

## 2022-08-22 RX ORDER — OXYCODONE HCL 10 MG/1
10 TABLET, FILM COATED, EXTENDED RELEASE ORAL 2 TIMES DAILY
Qty: 10 TABLET | Refills: 0 | Status: SHIPPED | OUTPATIENT
Start: 2022-08-22 | End: 2022-08-22 | Stop reason: SDUPTHER

## 2022-08-22 NOTE — TELEPHONE ENCOUNTER
oxyCODONE (OXYCONTIN) 10 MG extended release tablet [[id number]]     Order Details  Dose: 10 mg Route: Oral Frequency: 2 TIMES DAILY   Dispense Quantity: 10 tablet Refills: 0    Note to Pharmacy: Reduce doses taken as pain becomes manageable         Sig: Take 1 tablet by mouth 2 times daily for 5 days. Intended supply: 10 days         Start Date: 08/22/22 End Date: 08/27/22 after 10 doses   Written Date: 08/22/22 Expiration Date: 10/21/22   Earliest Fill Date: 08/22/22             THEY NEED A CLARIFICATION ON THE PRESCRIPTION THE DIRECTIONS DOES NOT MATCH THE QUANTITY OF TABLETS?

## 2022-08-25 ENCOUNTER — PATIENT MESSAGE (OUTPATIENT)
Dept: FAMILY MEDICINE CLINIC | Age: 57
End: 2022-08-25

## 2022-08-26 RX ORDER — HYDROXYZINE 50 MG/1
50 TABLET, FILM COATED ORAL EVERY 8 HOURS PRN
Qty: 30 TABLET | Refills: 0 | Status: SHIPPED | OUTPATIENT
Start: 2022-08-26 | End: 2022-09-05

## 2022-08-26 NOTE — TELEPHONE ENCOUNTER
From: Phoebe Reddy  To: Dr. Tracy Collier  Sent: 8/25/2022 8:17 PM EDT  Subject: Legs    Hi. Me again. The rash is gone but I still feel itchy and Im nervous the cellulitis isnt gone. So I just wanted to see what you think.    Thank you  Marissa

## 2022-09-02 DIAGNOSIS — K52.1 CHEMOTHERAPY INDUCED DIARRHEA: ICD-10-CM

## 2022-09-02 DIAGNOSIS — T45.1X5A CHEMOTHERAPY INDUCED DIARRHEA: ICD-10-CM

## 2022-09-02 DIAGNOSIS — L27.1 CHEMOTHERAPY-INDUCED ACRAL ERYTHEMA: ICD-10-CM

## 2022-09-02 RX ORDER — OXYCODONE AND ACETAMINOPHEN 7.5; 325 MG/1; MG/1
1 TABLET ORAL 2 TIMES DAILY PRN
Qty: 60 TABLET | Refills: 0 | Status: SHIPPED | OUTPATIENT
Start: 2022-09-02 | End: 2022-09-30 | Stop reason: SDUPTHER

## 2022-09-06 RX ORDER — ATORVASTATIN CALCIUM 10 MG/1
TABLET, FILM COATED ORAL
Qty: 90 TABLET | Refills: 0 | Status: SHIPPED | OUTPATIENT
Start: 2022-09-06

## 2022-09-06 NOTE — TELEPHONE ENCOUNTER
Darrall Pill is calling to request a refill on the following medication(s):    Last Visit Date (If Applicable):  4/71/3018    Next Visit Date:    Visit date not found    Medication Request:  Requested Prescriptions     Pending Prescriptions Disp Refills    atorvastatin (LIPITOR) 10 MG tablet [Pharmacy Med Name: ATORVASTATIN 10 MG TABLET] 90 tablet 0     Sig: take 1 tablet by mouth once daily

## 2022-09-20 DIAGNOSIS — Z17.0 MALIGNANT NEOPLASM OF UPPER-OUTER QUADRANT OF RIGHT BREAST IN FEMALE, ESTROGEN RECEPTOR POSITIVE (HCC): ICD-10-CM

## 2022-09-20 DIAGNOSIS — L27.1 CHEMOTHERAPY-INDUCED ACRAL ERYTHEMA: ICD-10-CM

## 2022-09-20 DIAGNOSIS — C50.411 MALIGNANT NEOPLASM OF UPPER-OUTER QUADRANT OF RIGHT BREAST IN FEMALE, ESTROGEN RECEPTOR POSITIVE (HCC): ICD-10-CM

## 2022-09-20 DIAGNOSIS — G62.0 CHEMOTHERAPY-INDUCED PERIPHERAL NEUROPATHY (HCC): ICD-10-CM

## 2022-09-20 DIAGNOSIS — T45.1X5A CHEMOTHERAPY-INDUCED PERIPHERAL NEUROPATHY (HCC): ICD-10-CM

## 2022-09-20 RX ORDER — OXYCODONE AND ACETAMINOPHEN 10; 325 MG/1; MG/1
1 TABLET ORAL DAILY
Qty: 30 TABLET | Refills: 0 | Status: SHIPPED | OUTPATIENT
Start: 2022-09-20 | End: 2022-10-18 | Stop reason: SDUPTHER

## 2022-09-29 DIAGNOSIS — G44.221 CHRONIC TENSION-TYPE HEADACHE, INTRACTABLE: ICD-10-CM

## 2022-09-29 DIAGNOSIS — C50.511 MALIGNANT NEOPLASM OF LOWER-OUTER QUADRANT OF RIGHT BREAST OF FEMALE, ESTROGEN RECEPTOR POSITIVE (HCC): ICD-10-CM

## 2022-09-29 DIAGNOSIS — Z17.0 MALIGNANT NEOPLASM OF LOWER-OUTER QUADRANT OF RIGHT BREAST OF FEMALE, ESTROGEN RECEPTOR POSITIVE (HCC): ICD-10-CM

## 2022-09-29 RX ORDER — DULOXETIN HYDROCHLORIDE 30 MG/1
CAPSULE, DELAYED RELEASE ORAL
Qty: 90 CAPSULE | Refills: 1 | Status: SHIPPED | OUTPATIENT
Start: 2022-09-29

## 2022-09-30 DIAGNOSIS — K52.1 CHEMOTHERAPY INDUCED DIARRHEA: ICD-10-CM

## 2022-09-30 DIAGNOSIS — L27.1 CHEMOTHERAPY-INDUCED ACRAL ERYTHEMA: ICD-10-CM

## 2022-09-30 DIAGNOSIS — T45.1X5A CHEMOTHERAPY INDUCED DIARRHEA: ICD-10-CM

## 2022-09-30 RX ORDER — OXYCODONE AND ACETAMINOPHEN 7.5; 325 MG/1; MG/1
1 TABLET ORAL 2 TIMES DAILY PRN
Qty: 60 TABLET | Refills: 0 | Status: SHIPPED | OUTPATIENT
Start: 2022-09-30 | End: 2022-11-01 | Stop reason: SDUPTHER

## 2022-10-06 DIAGNOSIS — C50.511 MALIGNANT NEOPLASM OF LOWER-OUTER QUADRANT OF RIGHT BREAST OF FEMALE, ESTROGEN RECEPTOR POSITIVE (HCC): ICD-10-CM

## 2022-10-06 DIAGNOSIS — G44.221 CHRONIC TENSION-TYPE HEADACHE, INTRACTABLE: ICD-10-CM

## 2022-10-06 DIAGNOSIS — Z17.0 MALIGNANT NEOPLASM OF LOWER-OUTER QUADRANT OF RIGHT BREAST OF FEMALE, ESTROGEN RECEPTOR POSITIVE (HCC): ICD-10-CM

## 2022-10-06 RX ORDER — DULOXETIN HYDROCHLORIDE 60 MG/1
CAPSULE, DELAYED RELEASE ORAL
Qty: 30 CAPSULE | Refills: 2 | Status: SHIPPED | OUTPATIENT
Start: 2022-10-06

## 2022-10-18 DIAGNOSIS — Z17.0 MALIGNANT NEOPLASM OF UPPER-OUTER QUADRANT OF RIGHT BREAST IN FEMALE, ESTROGEN RECEPTOR POSITIVE (HCC): ICD-10-CM

## 2022-10-18 DIAGNOSIS — T45.1X5A CHEMOTHERAPY-INDUCED PERIPHERAL NEUROPATHY (HCC): ICD-10-CM

## 2022-10-18 DIAGNOSIS — C50.411 MALIGNANT NEOPLASM OF UPPER-OUTER QUADRANT OF RIGHT BREAST IN FEMALE, ESTROGEN RECEPTOR POSITIVE (HCC): ICD-10-CM

## 2022-10-18 DIAGNOSIS — G62.0 CHEMOTHERAPY-INDUCED PERIPHERAL NEUROPATHY (HCC): ICD-10-CM

## 2022-10-18 DIAGNOSIS — L27.1 CHEMOTHERAPY-INDUCED ACRAL ERYTHEMA: ICD-10-CM

## 2022-10-20 RX ORDER — OXYCODONE AND ACETAMINOPHEN 10; 325 MG/1; MG/1
1 TABLET ORAL DAILY
Qty: 30 TABLET | Refills: 0 | Status: SHIPPED | OUTPATIENT
Start: 2022-10-20 | End: 2022-11-19

## 2022-10-31 DIAGNOSIS — K52.1 CHEMOTHERAPY INDUCED DIARRHEA: ICD-10-CM

## 2022-10-31 DIAGNOSIS — T45.1X5A CHEMOTHERAPY INDUCED DIARRHEA: ICD-10-CM

## 2022-10-31 DIAGNOSIS — L27.1 CHEMOTHERAPY-INDUCED ACRAL ERYTHEMA: ICD-10-CM

## 2022-11-01 ENCOUNTER — PATIENT MESSAGE (OUTPATIENT)
Dept: FAMILY MEDICINE CLINIC | Age: 57
End: 2022-11-01

## 2022-11-01 DIAGNOSIS — L27.1 CHEMOTHERAPY-INDUCED ACRAL ERYTHEMA: ICD-10-CM

## 2022-11-01 DIAGNOSIS — K52.1 CHEMOTHERAPY INDUCED DIARRHEA: ICD-10-CM

## 2022-11-01 DIAGNOSIS — T45.1X5A CHEMOTHERAPY INDUCED DIARRHEA: ICD-10-CM

## 2022-11-01 RX ORDER — OXYCODONE AND ACETAMINOPHEN 7.5; 325 MG/1; MG/1
1 TABLET ORAL 2 TIMES DAILY PRN
Qty: 60 TABLET | Refills: 0 | Status: SHIPPED | OUTPATIENT
Start: 2022-11-01 | End: 2022-11-28 | Stop reason: SDUPTHER

## 2022-11-01 RX ORDER — OXYCODONE AND ACETAMINOPHEN 7.5; 325 MG/1; MG/1
1 TABLET ORAL 2 TIMES DAILY PRN
Qty: 60 TABLET | Refills: 0 | OUTPATIENT
Start: 2022-11-01 | End: 2022-12-05

## 2022-11-01 NOTE — TELEPHONE ENCOUNTER
Jatin Villeda is calling to request a refill on the following medication(s):    Last Visit Date (If Applicable):  Visit date not found    Next Visit Date:    Visit date not found    Medication Request:  Requested Prescriptions     Pending Prescriptions Disp Refills    oxyCODONE-acetaminophen (PERCOCET) 7.5-325 MG per tablet 60 tablet 0     Sig: Take 1 tablet by mouth 2 times daily as needed for Pain for up to 30 doses.  Intended supply: 30 days

## 2022-11-01 NOTE — TELEPHONE ENCOUNTER
From: Otilio Epley  To: Dr. Danny Perez  Sent: 11/1/2022 8:29 AM EDT  Subject: Med refill     Hi. I requested a refill on the 7.5 yesterday. I have not gotten a message that it was processed. Just checking if I need to come in?   Thank you   Marissa

## 2022-11-17 DIAGNOSIS — T45.1X5A CHEMOTHERAPY-INDUCED PERIPHERAL NEUROPATHY (HCC): ICD-10-CM

## 2022-11-17 DIAGNOSIS — G62.0 CHEMOTHERAPY-INDUCED PERIPHERAL NEUROPATHY (HCC): ICD-10-CM

## 2022-11-17 DIAGNOSIS — Z17.0 MALIGNANT NEOPLASM OF UPPER-OUTER QUADRANT OF RIGHT BREAST IN FEMALE, ESTROGEN RECEPTOR POSITIVE (HCC): ICD-10-CM

## 2022-11-17 DIAGNOSIS — L27.1 CHEMOTHERAPY-INDUCED ACRAL ERYTHEMA: ICD-10-CM

## 2022-11-17 DIAGNOSIS — C50.411 MALIGNANT NEOPLASM OF UPPER-OUTER QUADRANT OF RIGHT BREAST IN FEMALE, ESTROGEN RECEPTOR POSITIVE (HCC): ICD-10-CM

## 2022-11-17 RX ORDER — OXYCODONE AND ACETAMINOPHEN 10; 325 MG/1; MG/1
1 TABLET ORAL DAILY
Qty: 30 TABLET | Refills: 0 | Status: SHIPPED | OUTPATIENT
Start: 2022-11-17 | End: 2022-12-17

## 2022-11-28 DIAGNOSIS — K52.1 CHEMOTHERAPY INDUCED DIARRHEA: ICD-10-CM

## 2022-11-28 DIAGNOSIS — T45.1X5A CHEMOTHERAPY INDUCED DIARRHEA: ICD-10-CM

## 2022-11-28 DIAGNOSIS — L27.1 CHEMOTHERAPY-INDUCED ACRAL ERYTHEMA: ICD-10-CM

## 2022-11-28 RX ORDER — FLUTICASONE PROPIONATE 50 MCG
2 SPRAY, SUSPENSION (ML) NASAL DAILY
Qty: 1 EACH | Refills: 5 | Status: SHIPPED | OUTPATIENT
Start: 2022-11-28

## 2022-11-28 RX ORDER — OXYCODONE AND ACETAMINOPHEN 7.5; 325 MG/1; MG/1
1 TABLET ORAL 2 TIMES DAILY PRN
Qty: 60 TABLET | Refills: 0 | Status: SHIPPED | OUTPATIENT
Start: 2022-11-28 | End: 2023-01-01

## 2022-12-12 RX ORDER — ATORVASTATIN CALCIUM 10 MG/1
TABLET, FILM COATED ORAL
Qty: 90 TABLET | Refills: 0 | Status: SHIPPED | OUTPATIENT
Start: 2022-12-12

## 2022-12-14 DIAGNOSIS — L27.1 CHEMOTHERAPY-INDUCED ACRAL ERYTHEMA: ICD-10-CM

## 2022-12-14 DIAGNOSIS — Z17.0 MALIGNANT NEOPLASM OF UPPER-OUTER QUADRANT OF RIGHT BREAST IN FEMALE, ESTROGEN RECEPTOR POSITIVE (HCC): ICD-10-CM

## 2022-12-14 DIAGNOSIS — T45.1X5A CHEMOTHERAPY-INDUCED PERIPHERAL NEUROPATHY (HCC): ICD-10-CM

## 2022-12-14 DIAGNOSIS — G62.0 CHEMOTHERAPY-INDUCED PERIPHERAL NEUROPATHY (HCC): ICD-10-CM

## 2022-12-14 DIAGNOSIS — C50.411 MALIGNANT NEOPLASM OF UPPER-OUTER QUADRANT OF RIGHT BREAST IN FEMALE, ESTROGEN RECEPTOR POSITIVE (HCC): ICD-10-CM

## 2022-12-14 RX ORDER — OXYCODONE AND ACETAMINOPHEN 10; 325 MG/1; MG/1
1 TABLET ORAL DAILY
Qty: 30 TABLET | Refills: 0 | Status: SHIPPED | OUTPATIENT
Start: 2022-12-14 | End: 2023-01-12 | Stop reason: SDUPTHER

## 2022-12-20 ENCOUNTER — PATIENT MESSAGE (OUTPATIENT)
Dept: FAMILY MEDICINE CLINIC | Age: 57
End: 2022-12-20

## 2022-12-20 RX ORDER — CLARITHROMYCIN 500 MG/1
500 TABLET, COATED ORAL 2 TIMES DAILY
Qty: 14 TABLET | Refills: 0 | Status: SHIPPED | OUTPATIENT
Start: 2022-12-20 | End: 2022-12-27

## 2022-12-20 NOTE — TELEPHONE ENCOUNTER
From: Jazmyn Dyson  To: Dr. Brielle Delaney  Sent: 12/20/2022 12:40 PM EST  Subject: Sinus infection     Hi, Im running a low grade temp 99.5-99.7. I have a massive headache with sinus pressure and congestion, ears hurt. Could you call me in an antibiotic?   Marissa

## 2022-12-26 DIAGNOSIS — L27.1 CHEMOTHERAPY-INDUCED ACRAL ERYTHEMA: ICD-10-CM

## 2022-12-26 DIAGNOSIS — K52.1 CHEMOTHERAPY INDUCED DIARRHEA: ICD-10-CM

## 2022-12-26 DIAGNOSIS — T45.1X5A CHEMOTHERAPY INDUCED DIARRHEA: ICD-10-CM

## 2022-12-26 RX ORDER — OXYCODONE AND ACETAMINOPHEN 7.5; 325 MG/1; MG/1
1 TABLET ORAL 2 TIMES DAILY PRN
Qty: 60 TABLET | Refills: 0 | Status: SHIPPED | OUTPATIENT
Start: 2022-12-26 | End: 2023-01-29

## 2022-12-26 RX ORDER — OXYCODONE AND ACETAMINOPHEN 7.5; 325 MG/1; MG/1
1 TABLET ORAL 2 TIMES DAILY PRN
Qty: 60 TABLET | Refills: 0 | Status: CANCELLED | OUTPATIENT
Start: 2022-12-26 | End: 2023-01-29

## 2023-01-04 ENCOUNTER — PATIENT MESSAGE (OUTPATIENT)
Dept: FAMILY MEDICINE CLINIC | Age: 58
End: 2023-01-04

## 2023-01-11 DIAGNOSIS — C50.511 MALIGNANT NEOPLASM OF LOWER-OUTER QUADRANT OF RIGHT BREAST OF FEMALE, ESTROGEN RECEPTOR POSITIVE (HCC): ICD-10-CM

## 2023-01-11 DIAGNOSIS — Z17.0 MALIGNANT NEOPLASM OF LOWER-OUTER QUADRANT OF RIGHT BREAST OF FEMALE, ESTROGEN RECEPTOR POSITIVE (HCC): ICD-10-CM

## 2023-01-11 DIAGNOSIS — G44.221 CHRONIC TENSION-TYPE HEADACHE, INTRACTABLE: ICD-10-CM

## 2023-01-11 RX ORDER — DULOXETIN HYDROCHLORIDE 60 MG/1
CAPSULE, DELAYED RELEASE ORAL
Qty: 30 CAPSULE | Refills: 2 | Status: SHIPPED | OUTPATIENT
Start: 2023-01-11

## 2023-01-11 NOTE — TELEPHONE ENCOUNTER
Dianne Prince is calling to request a refill on the following medication(s):    Last Visit Date (If Applicable):  Visit date not found    Next Visit Date:    Visit date not found    Medication Request:  Requested Prescriptions     Pending Prescriptions Disp Refills    DULoxetine (CYMBALTA) 60 MG extended release capsule [Pharmacy Med Name: DULOXETINE HCL DR 60 MG CAP] 30 capsule 2     Sig: take 1 capsule by mouth once daily

## 2023-01-12 ENCOUNTER — OFFICE VISIT (OUTPATIENT)
Dept: FAMILY MEDICINE CLINIC | Age: 58
End: 2023-01-12
Payer: COMMERCIAL

## 2023-01-12 VITALS
OXYGEN SATURATION: 98 % | TEMPERATURE: 97.7 F | BODY MASS INDEX: 31.5 KG/M2 | DIASTOLIC BLOOD PRESSURE: 87 MMHG | HEART RATE: 99 BPM | WEIGHT: 177.8 LBS | SYSTOLIC BLOOD PRESSURE: 136 MMHG

## 2023-01-12 DIAGNOSIS — L27.1 CHEMOTHERAPY-INDUCED ACRAL ERYTHEMA: ICD-10-CM

## 2023-01-12 DIAGNOSIS — Z17.0 MALIGNANT NEOPLASM OF UPPER-OUTER QUADRANT OF RIGHT BREAST IN FEMALE, ESTROGEN RECEPTOR POSITIVE (HCC): ICD-10-CM

## 2023-01-12 DIAGNOSIS — R73.03 PREDIABETES: Primary | ICD-10-CM

## 2023-01-12 DIAGNOSIS — T45.1X5A CHEMOTHERAPY-INDUCED PERIPHERAL NEUROPATHY (HCC): ICD-10-CM

## 2023-01-12 DIAGNOSIS — C50.411 MALIGNANT NEOPLASM OF UPPER-OUTER QUADRANT OF RIGHT BREAST IN FEMALE, ESTROGEN RECEPTOR POSITIVE (HCC): ICD-10-CM

## 2023-01-12 DIAGNOSIS — G62.0 CHEMOTHERAPY-INDUCED PERIPHERAL NEUROPATHY (HCC): ICD-10-CM

## 2023-01-12 LAB — HBA1C MFR BLD: 5.8 %

## 2023-01-12 PROCEDURE — 3075F SYST BP GE 130 - 139MM HG: CPT | Performed by: FAMILY MEDICINE

## 2023-01-12 PROCEDURE — 83036 HEMOGLOBIN GLYCOSYLATED A1C: CPT | Performed by: FAMILY MEDICINE

## 2023-01-12 PROCEDURE — 3079F DIAST BP 80-89 MM HG: CPT | Performed by: FAMILY MEDICINE

## 2023-01-12 PROCEDURE — 99213 OFFICE O/P EST LOW 20 MIN: CPT | Performed by: FAMILY MEDICINE

## 2023-01-12 RX ORDER — OXYCODONE AND ACETAMINOPHEN 10; 325 MG/1; MG/1
1 TABLET ORAL DAILY
Qty: 30 TABLET | Refills: 0 | Status: SHIPPED | OUTPATIENT
Start: 2023-01-12 | End: 2023-02-11

## 2023-01-12 SDOH — ECONOMIC STABILITY: FOOD INSECURITY: WITHIN THE PAST 12 MONTHS, THE FOOD YOU BOUGHT JUST DIDN'T LAST AND YOU DIDN'T HAVE MONEY TO GET MORE.: NEVER TRUE

## 2023-01-12 SDOH — ECONOMIC STABILITY: FOOD INSECURITY: WITHIN THE PAST 12 MONTHS, YOU WORRIED THAT YOUR FOOD WOULD RUN OUT BEFORE YOU GOT MONEY TO BUY MORE.: NEVER TRUE

## 2023-01-12 ASSESSMENT — SOCIAL DETERMINANTS OF HEALTH (SDOH): HOW HARD IS IT FOR YOU TO PAY FOR THE VERY BASICS LIKE FOOD, HOUSING, MEDICAL CARE, AND HEATING?: NOT HARD AT ALL

## 2023-01-12 ASSESSMENT — PATIENT HEALTH QUESTIONNAIRE - PHQ9
SUM OF ALL RESPONSES TO PHQ QUESTIONS 1-9: 0
SUM OF ALL RESPONSES TO PHQ9 QUESTIONS 1 & 2: 0
SUM OF ALL RESPONSES TO PHQ QUESTIONS 1-9: 0
1. LITTLE INTEREST OR PLEASURE IN DOING THINGS: 0
2. FEELING DOWN, DEPRESSED OR HOPELESS: 0
SUM OF ALL RESPONSES TO PHQ QUESTIONS 1-9: 0
SUM OF ALL RESPONSES TO PHQ QUESTIONS 1-9: 0

## 2023-01-12 NOTE — PROGRESS NOTES
General FM note    Shanell Pérez is a 61 y.o. female who presents today for follow up on her  medical conditions as noted below. Shanell Pérez is c/o of   Chief Complaint   Patient presents with    Medication Check       Patient Active Problem List:     H/O severe sun exposure     Chronic tension-type headache, intractable     Essential hypertension     Microscopic hematuria     Lower abdominal pain     Malignant neoplasm of upper-outer quadrant of breast in female, estrogen receptor positive ([address]     Chemotherapy induced diarrhea     Chemotherapy-induced acral erythema     Past Medical History:   Diagnosis Date    Anxiety     Asthma     Depression     Headache     Hyperlipidemia     Hypertension     Malignant neoplasm of upper-outer quadrant of breast in female, estrogen receptor positive ([address] 8/7/2018      Past Surgical History:   Procedure Laterality Date    BLADDER SURGERY  2000    BREAST SURGERY      cyst removed left breast    PARTIAL HYSTERECTOMY (CERVIX NOT REMOVED)      [place] [facility] PR[facility] CTR VAD W/SUBQ PORT AGE 5 YR/> Left 8/13/2018    PORT INSERTION WITH US AND FLUORO performed by Kailash Worrell MD at [address] [place] [address]    SALPINGO-OOPHORECTOMY Left     ectopic pregnancy treated by Dr. Td Rodríguez      TUNNELED VENOUS PORT PLACEMENT Left 08/13/2018    chemo port left chest     Family History   Problem Relation Age of Onset    Arthritis Mother         rheumatoid    Cancer Father         lung cancer    Other Father         circulatory issues    Breast Cancer Paternal Grandmother     Cancer Sister         skin cancer    Colon Cancer Other      Current Outpatient Medications   Medication Sig Dispense Refill    oxyCODONE-acetaminophen (ENDOCET)  MG per tablet Take 1 tablet by mouth daily for 30 days.  Intended supply: 30 days 30 tablet 0    DULoxetine (CYMBALTA) 60 MG extended release capsule take 1 capsule by mouth once daily 30 capsule 2    oxyCODONE-acetaminophen (PERCOCET) 7.5-325 MG per tablet Take 1 tablet by mouth 2 times daily as needed for Pain for up to 30 doses. Intended supply: 30 days 60 tablet 0    atorvastatin (LIPITOR) 10 MG tablet take 1 tablet by mouth once daily 90 tablet 0    fluticasone (FLONASE) 50 MCG/ACT nasal spray 2 sprays by Each Nostril route daily 1 each 5    DULoxetine (CYMBALTA) 30 MG extended release capsule take 1 capsule by mouth once daily 90 capsule 1    albuterol sulfate  (90 Base) MCG/ACT inhaler INHALE 2 PUFFS BY MOUTH 4 TIMES DAILY IF NEEDED, 18 g 3    ciprofloxacin-dexamethasone (CIPRODEX) 0.3-0.1 % otic suspension instill 4 drops into each ear twice a day 7.5 mL 0    anastrozole (ARIMIDEX) 1 MG tablet       triamcinolone (KENALOG) 0.1 % cream APPLY TO RASH ON BODY TWICE DAILY (NOT FACE, ARMPIT OR GROIN) 80 g 1    SUMAtriptan (IMITREX) 20 MG/ACT nasal spray 1 spray by Nasal route daily as needed for Migraine 1 Inhaler 3    rOPINIRole (REQUIP) 0.25 MG tablet Take 1 tablet by mouth 3 times daily 90 tablet 3    prochlorperazine (COMPAZINE) 10 MG tablet Take 10 mg by mouth every 6 hours as needed      lidocaine (LMX) 4 % cream Apply thin layer to hands 30 minutes prior to showering 45 g 1    tacrolimus (PROTOPIC) 0.1 % ointment Apply to rash on face twice daily 30 g 2    valACYclovir (VALTREX) 1 g tablet Take 1 tablet by mouth 2 times daily (Patient not taking: No sig reported) 20 tablet 3    SUMAtriptan (IMITREX) 100 MG tablet Take 1 tablet by mouth once as needed for Migraine 9 tablet 3    triamcinolone acetonide (KENALOG) 0.1 % paste Apply to teeth 2 times daily. (Patient not taking: No sig reported) 5 g 1    furosemide (LASIX) 20 MG tablet Take 1 tablet by mouth daily (Patient not taking: No sig reported) 10 tablet 0    lidocaine-prilocaine (EMLA) 2.5-2.5 % cream Apply topically Daily as needed. (Patient not taking: No sig reported) 1 Tube 1     No current facility-administered medications for this visit.      ALLERGIES:    Allergies   Allergen Reactions    Dye [Iodides] Anaphylaxis    Eggs Or Egg-Derived Products Anaphylaxis    Shellfish-Derived Products Anaphylaxis, Shortness Of Breath and Swelling       Social History     Tobacco Use    Smoking status: Every Day     Packs/day: 1.00     Years: 40.00     Pack years: 40.00     Types: Cigarettes    Smokeless tobacco: Never   Substance Use Topics    Alcohol use: No     Alcohol/week: 0.0 standard drinks     Comment: social      Body mass index is 31.5 kg/m². /87   Pulse 99   Temp 97.7 °F (36.5 °C)   Wt 177 lb 12.8 oz (80.6 kg)   SpO2 98%   BMI 31.50 kg/m²     Subjective:      HPI    61 y.o. female coming in today for follow-up. She was diagnosed with a metastatic right breast cancer in July 2018. She states that she had a PET scan and she is quite anxious about the results. She has an appointment next week with the oncologist.  The neuropathy induced by chemotherapy now is going up her ankle areas. She states that she is not able to sleep in the morning because of this discomfort. She usually takes the Cymbalta in the morning and then continues to take pain medication throughout the day. Again there is no other methods to treat this discomfort as I discussed this in length with the pain specialist here in the office. Review of Systems   Constitutional: Negative for fever and unexpected weight change. Pertinent items are noted in HPI. Objective:   Physical Exam  Constitutional: VS (see above). General appearance: normal development, habitus and attention, no deformities. No distress. Eyes: normal conjunctiva and lids. CAV: RRR, no RMG. No edema lower extremities. Pulmo: CTA bilateral, no CWR. Skin: no rashes, lesions or ulcers. Musculoskeletal: normal gait. Nails: no clubbing or cyanosis. Psychiatric: alert and oriented to place, time and person. Normal mood and affect. A1c 5.8 today. Assessment:       Diagnosis Orders   1. Prediabetes  POCT glycosylated hemoglobin (Hb A1C)      2. Malignant neoplasm of upper-outer quadrant of right breast in female, estrogen receptor positive ([facility]  Pain Management Drug Screen    oxyCODONE-acetaminophen (ENDOCET)  MG per tablet      3. Chemotherapy-induced acral erythema  Pain Management Drug Screen    oxyCODONE-acetaminophen (ENDOCET)  MG per tablet      4. Chemotherapy-induced peripheral neuropathy (HCC)  Pain Management Drug Screen    oxyCODONE-acetaminophen (ENDOCET)  MG per tablet          Plan:   A1c well controlled. We will continue patient's pain medication as it seems very high but I feel that the patient has a lot to carry including the metastatic breast cancer as well as the nerve pain progressing. Return in about 3 months (around 4/12/2023), or if symptoms worsen or fail to improve. Orders Placed This Encounter   Procedures    Pain Management Drug Screen     Standing Status:   Future     Standing Expiration Date:   1/12/2024    POCT glycosylated hemoglobin (Hb A1C)     Orders Placed This Encounter   Medications    oxyCODONE-acetaminophen (ENDOCET)  MG per tablet     Sig: Take 1 tablet by mouth daily for 30 days. Intended supply: 30 days     Dispense:  30 tablet     Refill:  0     Reduce doses taken as pain becomes manageable       Call or return to clinic prn if these symptoms worsen or fail to improve as anticipated. I have reviewed the instructions with the patient, answering all questions to patient's satisfaction. Tristin Coon received counseling on the following healthy behaviors: nutrition, exercise, and medication adherence  Reviewed prior labs and health maintenance. Continue current medications, diet and exercise. Discussed use, benefit, and side effects of prescribed medications. Barriers to medication compliance addressed. Patient given educational materials - see patient instructions. All patient questions answered. Patient voiced understanding.       Electronically signed by Leanna Chung MD on 1/12/2023 at 10:26 AM       (Please note that portions of this note were completed with a voice recognition program. Efforts were made to edit the dictations but occasionally words are mis-transcribed.)

## 2023-01-18 ENCOUNTER — PATIENT MESSAGE (OUTPATIENT)
Dept: FAMILY MEDICINE CLINIC | Age: 58
End: 2023-01-18

## 2023-01-18 NOTE — TELEPHONE ENCOUNTER
From: Benjamin Kidney  To: Dr. Marisa Hager  Sent: 1/18/2023 3:27 PM EST  Subject: PET    Cancer free!!!!!

## 2023-01-23 DIAGNOSIS — L27.1 CHEMOTHERAPY-INDUCED ACRAL ERYTHEMA: ICD-10-CM

## 2023-01-23 DIAGNOSIS — K52.1 CHEMOTHERAPY INDUCED DIARRHEA: ICD-10-CM

## 2023-01-23 DIAGNOSIS — T45.1X5A CHEMOTHERAPY INDUCED DIARRHEA: ICD-10-CM

## 2023-01-23 RX ORDER — OXYCODONE AND ACETAMINOPHEN 7.5; 325 MG/1; MG/1
1 TABLET ORAL 2 TIMES DAILY PRN
Qty: 60 TABLET | Refills: 0 | Status: SHIPPED | OUTPATIENT
Start: 2023-01-23 | End: 2023-02-26

## 2023-02-09 DIAGNOSIS — C50.411 MALIGNANT NEOPLASM OF UPPER-OUTER QUADRANT OF RIGHT BREAST IN FEMALE, ESTROGEN RECEPTOR POSITIVE (HCC): ICD-10-CM

## 2023-02-09 DIAGNOSIS — Z17.0 MALIGNANT NEOPLASM OF UPPER-OUTER QUADRANT OF RIGHT BREAST IN FEMALE, ESTROGEN RECEPTOR POSITIVE (HCC): ICD-10-CM

## 2023-02-09 DIAGNOSIS — T45.1X5A CHEMOTHERAPY-INDUCED PERIPHERAL NEUROPATHY (HCC): ICD-10-CM

## 2023-02-09 DIAGNOSIS — L27.1 CHEMOTHERAPY-INDUCED ACRAL ERYTHEMA: ICD-10-CM

## 2023-02-09 DIAGNOSIS — G62.0 CHEMOTHERAPY-INDUCED PERIPHERAL NEUROPATHY (HCC): ICD-10-CM

## 2023-02-09 RX ORDER — OXYCODONE AND ACETAMINOPHEN 10; 325 MG/1; MG/1
1 TABLET ORAL DAILY
Qty: 30 TABLET | Refills: 0 | Status: SHIPPED | OUTPATIENT
Start: 2023-02-09 | End: 2023-03-11

## 2023-02-09 NOTE — TELEPHONE ENCOUNTER
[facility] is calling to request a refill on the following medication(s):    Last Visit Date (If Applicable):  2/89/8739    Next Visit Date:    Visit date not found    Medication Request:  Requested Prescriptions     Pending Prescriptions Disp Refills    oxyCODONE-acetaminophen (ENDOCET)  MG per tablet 30 tablet 0     Sig: Take 1 tablet by mouth daily for 30 days.  Intended supply: 30 days

## 2023-02-21 ENCOUNTER — PATIENT MESSAGE (OUTPATIENT)
Dept: FAMILY MEDICINE CLINIC | Age: 58
End: 2023-02-21

## 2023-02-21 DIAGNOSIS — T45.1X5A CHEMOTHERAPY INDUCED DIARRHEA: ICD-10-CM

## 2023-02-21 DIAGNOSIS — K52.1 CHEMOTHERAPY INDUCED DIARRHEA: ICD-10-CM

## 2023-02-21 DIAGNOSIS — L27.1 CHEMOTHERAPY-INDUCED ACRAL ERYTHEMA: ICD-10-CM

## 2023-02-21 RX ORDER — OXYCODONE AND ACETAMINOPHEN 7.5; 325 MG/1; MG/1
1 TABLET ORAL 2 TIMES DAILY PRN
Qty: 60 TABLET | Refills: 0 | Status: SHIPPED | OUTPATIENT
Start: 2023-02-21 | End: 2023-03-28

## 2023-02-21 NOTE — TELEPHONE ENCOUNTER
From: Harris Trivedi  To: Dr. Hung Bucio  Sent: 2/21/2023 9:43 AM EST  Subject: Dry needle acupuncture     Hi. [facility] wants me to check into this to see if it may help the neuropathy. Do you know of any practitioner doing dry needle acupuncture? Chikis also requested a refill on the 7.5 Im not sure if you can give the okay for an early fill.    Thank you,  Marissa

## 2023-02-21 NOTE — TELEPHONE ENCOUNTER
Fabby Power is calling to request a refill on the following medication(s):    Last Visit Date (If Applicable):  3/04/9336    Next Visit Date:    Visit date not found    Medication Request:  Requested Prescriptions     Pending Prescriptions Disp Refills    oxyCODONE-acetaminophen (PERCOCET) 7.5-325 MG per tablet 60 tablet 0     Sig: Take 1 tablet by mouth 2 times daily as needed for Pain for up to 30 doses.  Intended supply: 30 days

## 2023-03-08 DIAGNOSIS — C50.411 MALIGNANT NEOPLASM OF UPPER-OUTER QUADRANT OF RIGHT BREAST IN FEMALE, ESTROGEN RECEPTOR POSITIVE (HCC): ICD-10-CM

## 2023-03-08 DIAGNOSIS — L27.1 CHEMOTHERAPY-INDUCED ACRAL ERYTHEMA: ICD-10-CM

## 2023-03-08 DIAGNOSIS — Z17.0 MALIGNANT NEOPLASM OF UPPER-OUTER QUADRANT OF RIGHT BREAST IN FEMALE, ESTROGEN RECEPTOR POSITIVE (HCC): ICD-10-CM

## 2023-03-08 DIAGNOSIS — T45.1X5A CHEMOTHERAPY-INDUCED PERIPHERAL NEUROPATHY (HCC): ICD-10-CM

## 2023-03-08 DIAGNOSIS — G62.0 CHEMOTHERAPY-INDUCED PERIPHERAL NEUROPATHY (HCC): ICD-10-CM

## 2023-03-09 RX ORDER — OXYCODONE AND ACETAMINOPHEN 10; 325 MG/1; MG/1
1 TABLET ORAL DAILY
Qty: 30 TABLET | Refills: 0 | Status: SHIPPED | OUTPATIENT
Start: 2023-03-09 | End: 2023-04-08

## 2023-03-13 DIAGNOSIS — Z12.39 SCREENING BREAST EXAMINATION: Primary | ICD-10-CM

## 2023-03-13 RX ORDER — ATORVASTATIN CALCIUM 10 MG/1
TABLET, FILM COATED ORAL
Qty: 90 TABLET | Refills: 0 | Status: SHIPPED | OUTPATIENT
Start: 2023-03-13

## 2023-03-17 DIAGNOSIS — L27.1 CHEMOTHERAPY-INDUCED ACRAL ERYTHEMA: ICD-10-CM

## 2023-03-17 DIAGNOSIS — T45.1X5A CHEMOTHERAPY INDUCED DIARRHEA: ICD-10-CM

## 2023-03-17 DIAGNOSIS — K52.1 CHEMOTHERAPY INDUCED DIARRHEA: ICD-10-CM

## 2023-03-17 NOTE — TELEPHONE ENCOUNTER
Krysta Cheng is calling to request a refill on the following medication(s):    Last Visit Date (If Applicable):  4/92/4458    Next Visit Date:    Visit date not found    Medication Request:  Requested Prescriptions     Pending Prescriptions Disp Refills    oxyCODONE-acetaminophen (PERCOCET) 7.5-325 MG per tablet 60 tablet 0     Sig: Take 1 tablet by mouth 2 times daily as needed for Pain for up to 30 doses.  Intended supply: 30 days

## 2023-03-20 ENCOUNTER — PATIENT MESSAGE (OUTPATIENT)
Dept: FAMILY MEDICINE CLINIC | Age: 58
End: 2023-03-20

## 2023-03-20 RX ORDER — OXYCODONE AND ACETAMINOPHEN 7.5; 325 MG/1; MG/1
1 TABLET ORAL 2 TIMES DAILY PRN
Qty: 60 TABLET | Refills: 0 | Status: SHIPPED | OUTPATIENT
Start: 2023-03-20 | End: 2023-04-24

## 2023-03-20 NOTE — TELEPHONE ENCOUNTER
From: Heddy Fothergill  To: Dr. Wong Atkinson  Sent: 3/20/2023 9:34 AM EDT  Subject: 7.5 pain meds    Thank you for sending refill. [facility] is saying they cant fill until Wednesday. I know there are a million rules around this RX. Im not sure if you call if they will fill today. Needless to say nothing is getting better. Chikis actually been thinking about looking into SSN disability. Odalyse also started using CBD cream when then numbness gets over bearing.    Thank you,  Marissa

## 2023-03-21 ENCOUNTER — TELEPHONE (OUTPATIENT)
Dept: FAMILY MEDICINE CLINIC | Age: 58
End: 2023-03-21

## 2023-04-04 DIAGNOSIS — Z17.0 MALIGNANT NEOPLASM OF LOWER-OUTER QUADRANT OF RIGHT BREAST OF FEMALE, ESTROGEN RECEPTOR POSITIVE (HCC): ICD-10-CM

## 2023-04-04 DIAGNOSIS — C50.511 MALIGNANT NEOPLASM OF LOWER-OUTER QUADRANT OF RIGHT BREAST OF FEMALE, ESTROGEN RECEPTOR POSITIVE (HCC): ICD-10-CM

## 2023-04-04 DIAGNOSIS — G44.221 CHRONIC TENSION-TYPE HEADACHE, INTRACTABLE: ICD-10-CM

## 2023-04-04 RX ORDER — DULOXETIN HYDROCHLORIDE 30 MG/1
CAPSULE, DELAYED RELEASE ORAL
Qty: 90 CAPSULE | Refills: 1 | Status: SHIPPED | OUTPATIENT
Start: 2023-04-04

## 2023-04-05 DIAGNOSIS — T45.1X5A CHEMOTHERAPY-INDUCED PERIPHERAL NEUROPATHY (HCC): ICD-10-CM

## 2023-04-05 DIAGNOSIS — G62.0 CHEMOTHERAPY-INDUCED PERIPHERAL NEUROPATHY (HCC): ICD-10-CM

## 2023-04-05 DIAGNOSIS — L27.1 CHEMOTHERAPY-INDUCED ACRAL ERYTHEMA: ICD-10-CM

## 2023-04-05 DIAGNOSIS — Z17.0 MALIGNANT NEOPLASM OF UPPER-OUTER QUADRANT OF RIGHT BREAST IN FEMALE, ESTROGEN RECEPTOR POSITIVE (HCC): ICD-10-CM

## 2023-04-05 DIAGNOSIS — C50.411 MALIGNANT NEOPLASM OF UPPER-OUTER QUADRANT OF RIGHT BREAST IN FEMALE, ESTROGEN RECEPTOR POSITIVE (HCC): ICD-10-CM

## 2023-04-05 RX ORDER — OXYCODONE AND ACETAMINOPHEN 10; 325 MG/1; MG/1
1 TABLET ORAL DAILY
Qty: 30 TABLET | Refills: 0 | Status: SHIPPED | OUTPATIENT
Start: 2023-04-05 | End: 2023-05-05

## 2023-04-17 DIAGNOSIS — L27.1 CHEMOTHERAPY-INDUCED ACRAL ERYTHEMA: ICD-10-CM

## 2023-04-17 DIAGNOSIS — T45.1X5A CHEMOTHERAPY INDUCED DIARRHEA: ICD-10-CM

## 2023-04-17 DIAGNOSIS — K52.1 CHEMOTHERAPY INDUCED DIARRHEA: ICD-10-CM

## 2023-04-17 RX ORDER — OXYCODONE AND ACETAMINOPHEN 7.5; 325 MG/1; MG/1
1 TABLET ORAL 2 TIMES DAILY PRN
Qty: 60 TABLET | Refills: 0 | Status: SHIPPED | OUTPATIENT
Start: 2023-04-17 | End: 2023-05-22

## 2023-05-03 DIAGNOSIS — L27.1 CHEMOTHERAPY-INDUCED ACRAL ERYTHEMA: ICD-10-CM

## 2023-05-03 DIAGNOSIS — G62.0 CHEMOTHERAPY-INDUCED PERIPHERAL NEUROPATHY (HCC): ICD-10-CM

## 2023-05-03 DIAGNOSIS — C50.411 MALIGNANT NEOPLASM OF UPPER-OUTER QUADRANT OF RIGHT BREAST IN FEMALE, ESTROGEN RECEPTOR POSITIVE (HCC): ICD-10-CM

## 2023-05-03 DIAGNOSIS — T45.1X5A CHEMOTHERAPY-INDUCED PERIPHERAL NEUROPATHY (HCC): ICD-10-CM

## 2023-05-03 DIAGNOSIS — Z17.0 MALIGNANT NEOPLASM OF UPPER-OUTER QUADRANT OF RIGHT BREAST IN FEMALE, ESTROGEN RECEPTOR POSITIVE (HCC): ICD-10-CM

## 2023-05-03 RX ORDER — OXYCODONE AND ACETAMINOPHEN 10; 325 MG/1; MG/1
1 TABLET ORAL DAILY
Qty: 30 TABLET | Refills: 0 | Status: SHIPPED | OUTPATIENT
Start: 2023-05-03 | End: 2023-05-31 | Stop reason: SDUPTHER

## 2023-05-15 DIAGNOSIS — K52.1 CHEMOTHERAPY INDUCED DIARRHEA: ICD-10-CM

## 2023-05-15 DIAGNOSIS — T45.1X5A CHEMOTHERAPY INDUCED DIARRHEA: ICD-10-CM

## 2023-05-15 DIAGNOSIS — L27.1 CHEMOTHERAPY-INDUCED ACRAL ERYTHEMA: ICD-10-CM

## 2023-05-15 RX ORDER — OXYCODONE AND ACETAMINOPHEN 7.5; 325 MG/1; MG/1
1 TABLET ORAL 2 TIMES DAILY PRN
Qty: 60 TABLET | Refills: 0 | Status: SHIPPED | OUTPATIENT
Start: 2023-05-15 | End: 2023-06-19

## 2023-05-23 ENCOUNTER — OFFICE VISIT (OUTPATIENT)
Dept: FAMILY MEDICINE CLINIC | Age: 58
End: 2023-05-23
Payer: COMMERCIAL

## 2023-05-23 VITALS
TEMPERATURE: 97 F | HEART RATE: 103 BPM | BODY MASS INDEX: 33.05 KG/M2 | WEIGHT: 186.6 LBS | DIASTOLIC BLOOD PRESSURE: 88 MMHG | SYSTOLIC BLOOD PRESSURE: 138 MMHG | OXYGEN SATURATION: 98 %

## 2023-05-23 DIAGNOSIS — G62.0 CHEMOTHERAPY-INDUCED PERIPHERAL NEUROPATHY (HCC): Primary | ICD-10-CM

## 2023-05-23 DIAGNOSIS — L27.1 CHEMOTHERAPY-INDUCED ACRAL ERYTHEMA: ICD-10-CM

## 2023-05-23 DIAGNOSIS — C50.511 MALIGNANT NEOPLASM OF LOWER-OUTER QUADRANT OF RIGHT BREAST OF FEMALE, ESTROGEN RECEPTOR POSITIVE (HCC): ICD-10-CM

## 2023-05-23 DIAGNOSIS — Z17.0 MALIGNANT NEOPLASM OF UPPER-OUTER QUADRANT OF RIGHT BREAST IN FEMALE, ESTROGEN RECEPTOR POSITIVE (HCC): ICD-10-CM

## 2023-05-23 DIAGNOSIS — Z17.0 MALIGNANT NEOPLASM OF LOWER-OUTER QUADRANT OF RIGHT BREAST OF FEMALE, ESTROGEN RECEPTOR POSITIVE (HCC): ICD-10-CM

## 2023-05-23 DIAGNOSIS — C50.411 MALIGNANT NEOPLASM OF UPPER-OUTER QUADRANT OF RIGHT BREAST IN FEMALE, ESTROGEN RECEPTOR POSITIVE (HCC): ICD-10-CM

## 2023-05-23 DIAGNOSIS — T45.1X5A CHEMOTHERAPY-INDUCED PERIPHERAL NEUROPATHY (HCC): Primary | ICD-10-CM

## 2023-05-23 PROCEDURE — 3079F DIAST BP 80-89 MM HG: CPT | Performed by: FAMILY MEDICINE

## 2023-05-23 PROCEDURE — 3075F SYST BP GE 130 - 139MM HG: CPT | Performed by: FAMILY MEDICINE

## 2023-05-23 PROCEDURE — 99214 OFFICE O/P EST MOD 30 MIN: CPT | Performed by: FAMILY MEDICINE

## 2023-05-23 RX ORDER — AMITRIPTYLINE HYDROCHLORIDE 25 MG/1
25 TABLET, FILM COATED ORAL NIGHTLY
Qty: 30 TABLET | Refills: 5 | Status: SHIPPED | OUTPATIENT
Start: 2023-05-23

## 2023-05-23 SDOH — ECONOMIC STABILITY: HOUSING INSECURITY
IN THE LAST 12 MONTHS, WAS THERE A TIME WHEN YOU DID NOT HAVE A STEADY PLACE TO SLEEP OR SLEPT IN A SHELTER (INCLUDING NOW)?: NO

## 2023-05-23 SDOH — ECONOMIC STABILITY: FOOD INSECURITY: WITHIN THE PAST 12 MONTHS, YOU WORRIED THAT YOUR FOOD WOULD RUN OUT BEFORE YOU GOT MONEY TO BUY MORE.: NEVER TRUE

## 2023-05-23 SDOH — ECONOMIC STABILITY: INCOME INSECURITY: HOW HARD IS IT FOR YOU TO PAY FOR THE VERY BASICS LIKE FOOD, HOUSING, MEDICAL CARE, AND HEATING?: NOT HARD AT ALL

## 2023-05-23 SDOH — ECONOMIC STABILITY: FOOD INSECURITY: WITHIN THE PAST 12 MONTHS, THE FOOD YOU BOUGHT JUST DIDN'T LAST AND YOU DIDN'T HAVE MONEY TO GET MORE.: NEVER TRUE

## 2023-05-23 ASSESSMENT — PATIENT HEALTH QUESTIONNAIRE - PHQ9
2. FEELING DOWN, DEPRESSED OR HOPELESS: 0
1. LITTLE INTEREST OR PLEASURE IN DOING THINGS: 0
SUM OF ALL RESPONSES TO PHQ QUESTIONS 1-9: 0
SUM OF ALL RESPONSES TO PHQ9 QUESTIONS 1 & 2: 0
SUM OF ALL RESPONSES TO PHQ QUESTIONS 1-9: 0

## 2023-05-23 NOTE — PROGRESS NOTES
General FM note    Sandro Rojas is a 60 y.o. female who presents today for follow up on her  medical conditions as noted below. Sandro Rojas is c/o of   Chief Complaint   Patient presents with    Medication Check       Patient Active Problem List:     H/O severe sun exposure     Chronic tension-type headache, intractable     Essential hypertension     Microscopic hematuria     Lower abdominal pain     Malignant neoplasm of upper-outer quadrant of breast in female, estrogen receptor positive ([address]     Chemotherapy induced diarrhea     Chemotherapy-induced acral erythema     Past Medical History:   Diagnosis Date    Anxiety     Asthma     Depression     Headache     Hyperlipidemia     Hypertension     Malignant neoplasm of upper-outer quadrant of breast in female, estrogen receptor positive ([address] 8/7/2018      Past Surgical History:   Procedure Laterality Date    BLADDER SURGERY  2000    BREAST SURGERY      cyst removed left breast    PARTIAL HYSTERECTOMY (CERVIX NOT REMOVED)      [place] INS[facility] PRPH CTR VAD W/SUBQ PORT AGE 5 YR/> Left 8/13/2018    PORT INSERTION WITH US AND FLUORO performed by Bryan Elliott MD at [address] [place] [address]    SALPINGO-OOPHORECTOMY Left     ectopic pregnancy treated by Dr. Eric Durán      TUNNELED VENOUS PORT PLACEMENT Left 08/13/2018    chemo port left chest     Family History   Problem Relation Age of Onset    Arthritis Mother         rheumatoid    Cancer Father         lung cancer    Other Father         circulatory issues    Breast Cancer Paternal Grandmother     Cancer Sister         skin cancer    Colon Cancer Other      Current Outpatient Medications   Medication Sig Dispense Refill    amitriptyline (ELAVIL) 25 MG tablet Take 1 tablet by mouth nightly 30 tablet 5    oxyCODONE-acetaminophen (PERCOCET) 7.5-325 MG per tablet Take 1 tablet by mouth 2 times daily as needed for Pain for up to 30 doses.  Intended supply: 30 days 60 tablet 0    oxyCODONE-acetaminophen (ENDOCET)  MG per

## 2023-05-26 ENCOUNTER — PATIENT MESSAGE (OUTPATIENT)
Dept: FAMILY MEDICINE CLINIC | Age: 58
End: 2023-05-26

## 2023-05-26 NOTE — TELEPHONE ENCOUNTER
From: Reuben Sheldon  To: Dr. Ivis Silver  Sent: 5/26/2023 6:33 AM EDT  Subject: Disability paperwork     Morning. Taking your recommendation Im starting the paperwork through my employer for [employer] 1944. To start the paperwork is all on line. For the reason I was going to put neuropathy. Is that good or should I enter something else or add to that?   Thank you   Marissa

## 2023-05-31 DIAGNOSIS — L27.1 CHEMOTHERAPY-INDUCED ACRAL ERYTHEMA: ICD-10-CM

## 2023-05-31 DIAGNOSIS — C50.411 MALIGNANT NEOPLASM OF UPPER-OUTER QUADRANT OF RIGHT BREAST IN FEMALE, ESTROGEN RECEPTOR POSITIVE (HCC): ICD-10-CM

## 2023-05-31 DIAGNOSIS — T45.1X5A CHEMOTHERAPY-INDUCED PERIPHERAL NEUROPATHY (HCC): ICD-10-CM

## 2023-05-31 DIAGNOSIS — Z17.0 MALIGNANT NEOPLASM OF UPPER-OUTER QUADRANT OF RIGHT BREAST IN FEMALE, ESTROGEN RECEPTOR POSITIVE (HCC): ICD-10-CM

## 2023-05-31 DIAGNOSIS — G62.0 CHEMOTHERAPY-INDUCED PERIPHERAL NEUROPATHY (HCC): ICD-10-CM

## 2023-05-31 RX ORDER — OXYCODONE AND ACETAMINOPHEN 10; 325 MG/1; MG/1
1 TABLET ORAL DAILY
Qty: 30 TABLET | Refills: 0 | Status: SHIPPED | OUTPATIENT
Start: 2023-05-31 | End: 2023-06-30

## 2023-05-31 NOTE — TELEPHONE ENCOUNTER
Demetrio Alberts is calling to request a refill on the following medication(s):    Last Visit Date (If Applicable):  3/18/7540    Next Visit Date:    Visit date not found    Medication Request:  Requested Prescriptions     Pending Prescriptions Disp Refills    oxyCODONE-acetaminophen (ENDOCET)  MG per tablet 30 tablet 0     Sig: Take 1 tablet by mouth daily for 30 days.  Intended supply: 30 days

## 2023-06-28 DIAGNOSIS — G62.0 CHEMOTHERAPY-INDUCED PERIPHERAL NEUROPATHY (HCC): ICD-10-CM

## 2023-06-28 DIAGNOSIS — C50.411 MALIGNANT NEOPLASM OF UPPER-OUTER QUADRANT OF RIGHT BREAST IN FEMALE, ESTROGEN RECEPTOR POSITIVE (HCC): ICD-10-CM

## 2023-06-28 DIAGNOSIS — T45.1X5A CHEMOTHERAPY-INDUCED PERIPHERAL NEUROPATHY (HCC): ICD-10-CM

## 2023-06-28 DIAGNOSIS — Z17.0 MALIGNANT NEOPLASM OF UPPER-OUTER QUADRANT OF RIGHT BREAST IN FEMALE, ESTROGEN RECEPTOR POSITIVE (HCC): ICD-10-CM

## 2023-06-28 DIAGNOSIS — L27.1 CHEMOTHERAPY-INDUCED ACRAL ERYTHEMA: ICD-10-CM

## 2023-06-28 RX ORDER — OXYCODONE AND ACETAMINOPHEN 10; 325 MG/1; MG/1
1 TABLET ORAL DAILY
Qty: 30 TABLET | Refills: 0 | Status: SHIPPED | OUTPATIENT
Start: 2023-06-28 | End: 2023-07-28

## 2023-07-05 DIAGNOSIS — C50.511 MALIGNANT NEOPLASM OF LOWER-OUTER QUADRANT OF RIGHT BREAST OF FEMALE, ESTROGEN RECEPTOR POSITIVE (HCC): ICD-10-CM

## 2023-07-05 DIAGNOSIS — G44.221 CHRONIC TENSION-TYPE HEADACHE, INTRACTABLE: ICD-10-CM

## 2023-07-05 DIAGNOSIS — Z17.0 MALIGNANT NEOPLASM OF LOWER-OUTER QUADRANT OF RIGHT BREAST OF FEMALE, ESTROGEN RECEPTOR POSITIVE (HCC): ICD-10-CM

## 2023-07-05 RX ORDER — DULOXETIN HYDROCHLORIDE 60 MG/1
CAPSULE, DELAYED RELEASE ORAL
Qty: 30 CAPSULE | Refills: 2 | Status: SHIPPED | OUTPATIENT
Start: 2023-07-05

## 2023-07-09 DIAGNOSIS — L27.1 CHEMOTHERAPY-INDUCED ACRAL ERYTHEMA: ICD-10-CM

## 2023-07-09 DIAGNOSIS — T45.1X5A CHEMOTHERAPY INDUCED DIARRHEA: ICD-10-CM

## 2023-07-09 DIAGNOSIS — K52.1 CHEMOTHERAPY INDUCED DIARRHEA: ICD-10-CM

## 2023-07-09 RX ORDER — OXYCODONE AND ACETAMINOPHEN 7.5; 325 MG/1; MG/1
1 TABLET ORAL 2 TIMES DAILY PRN
Qty: 60 TABLET | Refills: 0 | OUTPATIENT
Start: 2023-07-09 | End: 2023-08-13

## 2023-07-10 ENCOUNTER — PATIENT MESSAGE (OUTPATIENT)
Dept: FAMILY MEDICINE CLINIC | Age: 58
End: 2023-07-10

## 2023-07-10 DIAGNOSIS — K52.1 CHEMOTHERAPY INDUCED DIARRHEA: ICD-10-CM

## 2023-07-10 DIAGNOSIS — T45.1X5A CHEMOTHERAPY INDUCED DIARRHEA: ICD-10-CM

## 2023-07-10 DIAGNOSIS — L27.1 CHEMOTHERAPY-INDUCED ACRAL ERYTHEMA: ICD-10-CM

## 2023-07-10 RX ORDER — OXYCODONE AND ACETAMINOPHEN 7.5; 325 MG/1; MG/1
1 TABLET ORAL 2 TIMES DAILY PRN
Qty: 60 TABLET | Refills: 0 | Status: SHIPPED | OUTPATIENT
Start: 2023-07-10 | End: 2023-08-14

## 2023-07-10 NOTE — TELEPHONE ENCOUNTER
From: Debra Davies  To: Dr. Horace Miller  Sent: 7/10/2023 11:54 AM EDT  Subject: Refill request     Hi, I requested a refill on the. 7.5 pain meds. Will you be able to send that request to [employer] today?   Thank you   Marissa

## 2023-07-27 DIAGNOSIS — T45.1X5A CHEMOTHERAPY-INDUCED PERIPHERAL NEUROPATHY (HCC): ICD-10-CM

## 2023-07-27 DIAGNOSIS — L27.1 CHEMOTHERAPY-INDUCED ACRAL ERYTHEMA: ICD-10-CM

## 2023-07-27 DIAGNOSIS — G62.0 CHEMOTHERAPY-INDUCED PERIPHERAL NEUROPATHY (HCC): ICD-10-CM

## 2023-07-27 DIAGNOSIS — C50.411 MALIGNANT NEOPLASM OF UPPER-OUTER QUADRANT OF RIGHT BREAST IN FEMALE, ESTROGEN RECEPTOR POSITIVE (HCC): ICD-10-CM

## 2023-07-27 DIAGNOSIS — Z17.0 MALIGNANT NEOPLASM OF UPPER-OUTER QUADRANT OF RIGHT BREAST IN FEMALE, ESTROGEN RECEPTOR POSITIVE (HCC): ICD-10-CM

## 2023-07-27 RX ORDER — OXYCODONE AND ACETAMINOPHEN 10; 325 MG/1; MG/1
1 TABLET ORAL DAILY
Qty: 30 TABLET | Refills: 0 | Status: SHIPPED | OUTPATIENT
Start: 2023-07-27 | End: 2023-08-26

## 2023-08-07 ENCOUNTER — PATIENT MESSAGE (OUTPATIENT)
Dept: FAMILY MEDICINE CLINIC | Age: 58
End: 2023-08-07

## 2023-08-07 DIAGNOSIS — L27.1 CHEMOTHERAPY-INDUCED ACRAL ERYTHEMA: ICD-10-CM

## 2023-08-07 DIAGNOSIS — T45.1X5A CHEMOTHERAPY INDUCED DIARRHEA: ICD-10-CM

## 2023-08-07 DIAGNOSIS — K52.1 CHEMOTHERAPY INDUCED DIARRHEA: ICD-10-CM

## 2023-08-07 RX ORDER — OXYCODONE AND ACETAMINOPHEN 7.5; 325 MG/1; MG/1
1 TABLET ORAL 2 TIMES DAILY PRN
Qty: 60 TABLET | Refills: 0 | OUTPATIENT
Start: 2023-08-07 | End: 2023-09-11

## 2023-08-07 RX ORDER — OXYCODONE AND ACETAMINOPHEN 7.5; 325 MG/1; MG/1
1 TABLET ORAL 2 TIMES DAILY PRN
Qty: 60 TABLET | Refills: 0 | Status: SHIPPED | OUTPATIENT
Start: 2023-08-07 | End: 2023-09-11

## 2023-08-07 NOTE — TELEPHONE ENCOUNTER
From: Reza Alberto  To: Dr. Tony King  Sent: 8/7/2023 11:03 AM EDT  Subject: Disability Paperwork    Hi,  Attached is the paperwork to continue my disability. I have an appt with you tomorrow at 9:00.   Thank you,  Marissa

## 2023-08-08 ENCOUNTER — OFFICE VISIT (OUTPATIENT)
Dept: FAMILY MEDICINE CLINIC | Age: 58
End: 2023-08-08
Payer: COMMERCIAL

## 2023-08-08 VITALS
TEMPERATURE: 97.1 F | BODY MASS INDEX: 31.68 KG/M2 | SYSTOLIC BLOOD PRESSURE: 138 MMHG | HEIGHT: 63 IN | WEIGHT: 178.8 LBS | DIASTOLIC BLOOD PRESSURE: 88 MMHG | HEART RATE: 104 BPM | OXYGEN SATURATION: 99 %

## 2023-08-08 DIAGNOSIS — T45.1X5A CHEMOTHERAPY-INDUCED PERIPHERAL NEUROPATHY (HCC): ICD-10-CM

## 2023-08-08 DIAGNOSIS — M67.40 GANGLION CYST: Primary | ICD-10-CM

## 2023-08-08 DIAGNOSIS — L27.1 CHEMOTHERAPY-INDUCED ACRAL ERYTHEMA: ICD-10-CM

## 2023-08-08 DIAGNOSIS — Z17.0 MALIGNANT NEOPLASM OF UPPER-OUTER QUADRANT OF RIGHT BREAST IN FEMALE, ESTROGEN RECEPTOR POSITIVE (HCC): ICD-10-CM

## 2023-08-08 DIAGNOSIS — C50.411 MALIGNANT NEOPLASM OF UPPER-OUTER QUADRANT OF RIGHT BREAST IN FEMALE, ESTROGEN RECEPTOR POSITIVE (HCC): ICD-10-CM

## 2023-08-08 DIAGNOSIS — G62.0 CHEMOTHERAPY-INDUCED PERIPHERAL NEUROPATHY (HCC): ICD-10-CM

## 2023-08-08 PROCEDURE — 99213 OFFICE O/P EST LOW 20 MIN: CPT | Performed by: FAMILY MEDICINE

## 2023-08-08 PROCEDURE — 3079F DIAST BP 80-89 MM HG: CPT | Performed by: FAMILY MEDICINE

## 2023-08-08 PROCEDURE — 3075F SYST BP GE 130 - 139MM HG: CPT | Performed by: FAMILY MEDICINE

## 2023-08-08 NOTE — PROGRESS NOTES
General FM note    Matty Quesada is a 60 y.o. female who presents today for follow up on her  medical conditions as noted below. Matty Quesada is c/o of   Chief Complaint   Patient presents with    Peripheral Neuropathy       Patient Active Problem List:     H/O severe sun exposure     Chronic tension-type headache, intractable     Essential hypertension     Microscopic hematuria     Lower abdominal pain     Malignant neoplasm of upper-outer quadrant of breast in female, estrogen receptor positive ([address])     Chemotherapy induced diarrhea     Chemotherapy-induced acral erythema     Past Medical History:   Diagnosis Date    Anxiety     Asthma     Depression     Headache     Hyperlipidemia     Hypertension     Malignant neoplasm of upper-outer quadrant of breast in female, estrogen receptor positive ([address]) 8/7/2018      Past Surgical History:   Procedure Laterality Date    BLADDER SURGERY  2000    BREAST SURGERY      cyst removed left breast    PARTIAL HYSTERECTOMY (CERVIX NOT REMOVED)      [place] [facility] PRPH CTR VAD W/SUBQ PORT AGE 4 YR/> Left 8/13/2018    PORT INSERTION WITH US AND FLUORO performed by Raiza Pool MD at [address]    SALPINGO-OOPHORECTOMY Left     ectopic pregnancy treated by Dr. Nancy Goetz      TUNNELED VENOUS PORT PLACEMENT Left 08/13/2018    chemo port left chest     Family History   Problem Relation Age of Onset    Arthritis Mother         rheumatoid    Cancer Father         lung cancer    Other Father         circulatory issues    Breast Cancer Paternal Grandmother     Cancer Sister         skin cancer    Colon Cancer Other      Current Outpatient Medications   Medication Sig Dispense Refill    oxyCODONE-acetaminophen (PERCOCET) 7.5-325 MG per tablet Take 1 tablet by mouth 2 times daily as needed for Pain for up to 60 doses. Intended supply: 30 days Max Daily Amount: 2 tablets 60 tablet 0    oxyCODONE-acetaminophen (ENDOCET)  MG per tablet Take 1 tablet by mouth daily for 30 days.

## 2023-08-09 ENCOUNTER — PATIENT MESSAGE (OUTPATIENT)
Dept: FAMILY MEDICINE CLINIC | Age: 58
End: 2023-08-09

## 2023-08-11 NOTE — TELEPHONE ENCOUNTER
From: Felisha Fleming  To: Dr. Kodi Reeves  Sent: 8/9/2023 12:02 PM EDT  Subject: Luz Richards ray and paperwork     Hi Dr Octaviano Duran and Marilynn Barron. Just checking if the X-Ray of my foot came back.  And if you could send me the disability form from yesterday so I can have for my file that would be great   Thank you   Marissa

## 2023-08-24 DIAGNOSIS — Z17.0 MALIGNANT NEOPLASM OF UPPER-OUTER QUADRANT OF RIGHT BREAST IN FEMALE, ESTROGEN RECEPTOR POSITIVE (HCC): ICD-10-CM

## 2023-08-24 DIAGNOSIS — L27.1 CHEMOTHERAPY-INDUCED ACRAL ERYTHEMA: ICD-10-CM

## 2023-08-24 DIAGNOSIS — C50.411 MALIGNANT NEOPLASM OF UPPER-OUTER QUADRANT OF RIGHT BREAST IN FEMALE, ESTROGEN RECEPTOR POSITIVE (HCC): ICD-10-CM

## 2023-08-24 DIAGNOSIS — T45.1X5A CHEMOTHERAPY-INDUCED PERIPHERAL NEUROPATHY (HCC): ICD-10-CM

## 2023-08-24 DIAGNOSIS — G62.0 CHEMOTHERAPY-INDUCED PERIPHERAL NEUROPATHY (HCC): ICD-10-CM

## 2023-08-24 RX ORDER — OXYCODONE AND ACETAMINOPHEN 10; 325 MG/1; MG/1
1 TABLET ORAL DAILY
Qty: 30 TABLET | Refills: 0 | Status: SHIPPED | OUTPATIENT
Start: 2023-08-24 | End: 2023-09-23

## 2023-08-29 ENCOUNTER — TELEPHONE (OUTPATIENT)
Dept: FAMILY MEDICINE CLINIC | Age: 58
End: 2023-08-29

## 2023-08-29 NOTE — TELEPHONE ENCOUNTER
----- Message from Petrona Winkler MA sent at 8/29/2023  9:48 AM EDT -----  Subject: Message to Provider    QUESTIONS  Information for Provider? Les Bonilla is calling   requesting office notes from 5/30/23 to present. Please fax to   [phone]. Please advise   ---------------------------------------------------------------------------  --------------  CALL BACK INFO  [phone]; OK to leave message on voicemail  ---------------------------------------------------------------------------  --------------  SCRIPT ANSWERS  Relationship to Patient? Covered Entity  Covered Entity Type? Health Insurance? Representative Name? Les Bonilla.

## 2023-09-05 DIAGNOSIS — K52.1 CHEMOTHERAPY INDUCED DIARRHEA: ICD-10-CM

## 2023-09-05 DIAGNOSIS — L27.1 CHEMOTHERAPY-INDUCED ACRAL ERYTHEMA: ICD-10-CM

## 2023-09-05 DIAGNOSIS — T45.1X5A CHEMOTHERAPY INDUCED DIARRHEA: ICD-10-CM

## 2023-09-05 RX ORDER — OXYCODONE AND ACETAMINOPHEN 7.5; 325 MG/1; MG/1
1 TABLET ORAL 2 TIMES DAILY PRN
Qty: 60 TABLET | Refills: 0 | Status: SHIPPED | OUTPATIENT
Start: 2023-09-05 | End: 2023-09-06 | Stop reason: SDUPTHER

## 2023-09-06 DIAGNOSIS — L27.1 CHEMOTHERAPY-INDUCED ACRAL ERYTHEMA: ICD-10-CM

## 2023-09-06 DIAGNOSIS — T45.1X5A CHEMOTHERAPY INDUCED DIARRHEA: ICD-10-CM

## 2023-09-06 DIAGNOSIS — K52.1 CHEMOTHERAPY INDUCED DIARRHEA: ICD-10-CM

## 2023-09-06 RX ORDER — OXYCODONE AND ACETAMINOPHEN 7.5; 325 MG/1; MG/1
1 TABLET ORAL 2 TIMES DAILY PRN
Qty: 60 TABLET | Refills: 0 | OUTPATIENT
Start: 2023-09-06 | End: 2023-10-11

## 2023-09-06 RX ORDER — OXYCODONE AND ACETAMINOPHEN 7.5; 325 MG/1; MG/1
1 TABLET ORAL 2 TIMES DAILY PRN
Qty: 60 TABLET | Refills: 0 | Status: SHIPPED | OUTPATIENT
Start: 2023-09-06 | End: 2023-10-11

## 2023-09-06 NOTE — TELEPHONE ENCOUNTER
Winnie Mendenhall is calling to request a refill on the following medication(s):    Last Visit Date (If Applicable):  8/4/3382    Next Visit Date:    Visit date not found    Medication Request:  Requested Prescriptions     Pending Prescriptions Disp Refills    oxyCODONE-acetaminophen (PERCOCET) 7.5-325 MG per tablet 60 tablet 0     Sig: Take 1 tablet by mouth 2 times daily as needed for Pain for up to 60 doses.  Intended supply: 30 days Max Daily Amount: 2 tablets

## 2023-09-12 RX ORDER — ATORVASTATIN CALCIUM 10 MG/1
TABLET, FILM COATED ORAL
Qty: 90 TABLET | Refills: 0 | Status: SHIPPED | OUTPATIENT
Start: 2023-09-12

## 2023-09-12 NOTE — TELEPHONE ENCOUNTER
Bridgette Heimlich is calling to request a refill on the following medication(s):    Last Visit Date (If Applicable):  9/1/6523    Next Visit Date:    9/29/2023    Medication Request:  Requested Prescriptions     Pending Prescriptions Disp Refills    atorvastatin (LIPITOR) 10 MG tablet [Pharmacy Med Name: ATORVASTATIN 10 MG TABLET] 90 tablet 0     Sig: take 1 tablet by mouth once daily

## 2023-09-21 DIAGNOSIS — Z17.0 MALIGNANT NEOPLASM OF UPPER-OUTER QUADRANT OF RIGHT BREAST IN FEMALE, ESTROGEN RECEPTOR POSITIVE (HCC): ICD-10-CM

## 2023-09-21 DIAGNOSIS — T45.1X5A CHEMOTHERAPY-INDUCED PERIPHERAL NEUROPATHY (HCC): ICD-10-CM

## 2023-09-21 DIAGNOSIS — G62.0 CHEMOTHERAPY-INDUCED PERIPHERAL NEUROPATHY (HCC): ICD-10-CM

## 2023-09-21 DIAGNOSIS — L27.1 CHEMOTHERAPY-INDUCED ACRAL ERYTHEMA: ICD-10-CM

## 2023-09-21 DIAGNOSIS — C50.411 MALIGNANT NEOPLASM OF UPPER-OUTER QUADRANT OF RIGHT BREAST IN FEMALE, ESTROGEN RECEPTOR POSITIVE (HCC): ICD-10-CM

## 2023-09-21 NOTE — TELEPHONE ENCOUNTER
Temi Sinha is calling to request a refill on the following medication(s):    Last Visit Date (If Applicable):  4/0/3386    Next Visit Date:    9/29/2023    Medication Request:  Requested Prescriptions     Pending Prescriptions Disp Refills    oxyCODONE-acetaminophen (ENDOCET)  MG per tablet 30 tablet 0     Sig: Take 1 tablet by mouth daily for 30 days.  Intended supply: 30 days             a

## 2023-09-22 RX ORDER — OXYCODONE AND ACETAMINOPHEN 10; 325 MG/1; MG/1
1 TABLET ORAL DAILY
Qty: 30 TABLET | Refills: 0 | Status: SHIPPED | OUTPATIENT
Start: 2023-09-22 | End: 2023-10-22

## 2023-09-22 NOTE — TELEPHONE ENCOUNTER
She takes the 10 mg at night to sleep and she takes the 7.5 during the day she mentioned she gets the medication for neuropathy     Please advise

## 2023-09-28 DIAGNOSIS — T45.1X5A CHEMOTHERAPY-INDUCED PERIPHERAL NEUROPATHY (HCC): Primary | ICD-10-CM

## 2023-09-28 DIAGNOSIS — G62.0 CHEMOTHERAPY-INDUCED PERIPHERAL NEUROPATHY (HCC): Primary | ICD-10-CM

## 2023-09-29 ENCOUNTER — TELEMEDICINE (OUTPATIENT)
Dept: FAMILY MEDICINE CLINIC | Age: 58
End: 2023-09-29
Payer: COMMERCIAL

## 2023-09-29 DIAGNOSIS — T45.1X5A CHEMOTHERAPY-INDUCED PERIPHERAL NEUROPATHY (HCC): Primary | ICD-10-CM

## 2023-09-29 DIAGNOSIS — L27.1 CHEMOTHERAPY-INDUCED ACRAL ERYTHEMA: ICD-10-CM

## 2023-09-29 DIAGNOSIS — G62.0 CHEMOTHERAPY-INDUCED PERIPHERAL NEUROPATHY (HCC): Primary | ICD-10-CM

## 2023-09-29 DIAGNOSIS — C50.411 MALIGNANT NEOPLASM OF UPPER-OUTER QUADRANT OF RIGHT BREAST IN FEMALE, ESTROGEN RECEPTOR POSITIVE (HCC): ICD-10-CM

## 2023-09-29 DIAGNOSIS — Z17.0 MALIGNANT NEOPLASM OF UPPER-OUTER QUADRANT OF RIGHT BREAST IN FEMALE, ESTROGEN RECEPTOR POSITIVE (HCC): ICD-10-CM

## 2023-09-29 PROCEDURE — 99213 OFFICE O/P EST LOW 20 MIN: CPT | Performed by: FAMILY MEDICINE

## 2023-09-29 NOTE — PROGRESS NOTES
General FM note    TeleHealth encounter -- Audio/Visual (During KGTYV-83 public health emergency)    Hudson Ambrosio is a 60 y.o. female who presents today for follow up on her  medical conditions as noted below. Hudson Ambrosio is c/o of   Chief Complaint   Patient presents with    Peripheral Neuropathy       Patient Active Problem List:     H/O severe sun exposure     Chronic tension-type headache, intractable     Essential hypertension     Microscopic hematuria     Lower abdominal pain     Malignant neoplasm of upper-outer quadrant of breast in female, estrogen receptor positive ([address])     Chemotherapy induced diarrhea     Chemotherapy-induced acral erythema     Chemotherapy-induced peripheral neuropathy (HCC)     Past Medical History:   Diagnosis Date    Anxiety     Asthma     Chemotherapy-induced peripheral neuropathy ([address]) 9/28/2023    Depression     Headache     Hyperlipidemia     Hypertension     Malignant neoplasm of upper-outer quadrant of breast in female, estrogen receptor positive ([address]) 8/7/2018      Past Surgical History:   Procedure Laterality Date    BLADDER SURGERY  2000    BREAST SURGERY      cyst removed left breast    PARTIAL HYSTERECTOMY (CERVIX NOT REMOVED)      [place] INS[facility] PRPH CTR VAD W/SUBQ PORT AGE 5 YR/> Left 8/13/2018    PORT INSERTION WITH US AND FLUORO performed by Deng Weeks MD at [address]    SALPINGO-OOPHORECTOMY Left     ectopic pregnancy treated by Dr. Yas Grossman      TUNNELED VENOUS PORT PLACEMENT Left 08/13/2018    chemo port left chest     Family History   Problem Relation Age of Onset    Arthritis Mother         rheumatoid    Cancer Father         lung cancer    Other Father         circulatory issues    Breast Cancer Paternal Grandmother     Cancer Sister         skin cancer    Colon Cancer Other      Current Outpatient Medications   Medication Sig Dispense Refill    oxyCODONE-acetaminophen (ENDOCET)  MG per tablet Take 1 tablet by mouth daily for 30 days.  27

## 2023-10-02 ENCOUNTER — PATIENT MESSAGE (OUTPATIENT)
Dept: FAMILY MEDICINE CLINIC | Age: 58
End: 2023-10-02

## 2023-10-02 DIAGNOSIS — T45.1X5A CHEMOTHERAPY INDUCED DIARRHEA: ICD-10-CM

## 2023-10-02 DIAGNOSIS — K52.1 CHEMOTHERAPY INDUCED DIARRHEA: ICD-10-CM

## 2023-10-02 DIAGNOSIS — L27.1 CHEMOTHERAPY-INDUCED ACRAL ERYTHEMA: ICD-10-CM

## 2023-10-02 RX ORDER — OXYCODONE AND ACETAMINOPHEN 7.5; 325 MG/1; MG/1
1 TABLET ORAL 2 TIMES DAILY PRN
Qty: 60 TABLET | Refills: 0 | Status: SHIPPED | OUTPATIENT
Start: 2023-10-02 | End: 2023-11-06

## 2023-10-02 NOTE — TELEPHONE ENCOUNTER
From: Santana Cooks  To: Dr. Yanick Morton  Sent: 10/2/2023 10:52 AM EDT  Subject: [employer]    Hi, [employer] of [address] called. They sent all my records to an outside medical review doctor. The review has not been completed at this time. I need a refill on the 7.5 pain meds. The correct pharmacy should be [facility]. It shows the [facility] [place]. I could not change it. Can you you send the refill request to [facility] please.      Thank you,  Marissa

## 2023-10-06 ENCOUNTER — PATIENT MESSAGE (OUTPATIENT)
Dept: FAMILY MEDICINE CLINIC | Age: 58
End: 2023-10-06

## 2023-10-07 DIAGNOSIS — Z17.0 MALIGNANT NEOPLASM OF LOWER-OUTER QUADRANT OF RIGHT BREAST OF FEMALE, ESTROGEN RECEPTOR POSITIVE (HCC): ICD-10-CM

## 2023-10-07 DIAGNOSIS — C50.511 MALIGNANT NEOPLASM OF LOWER-OUTER QUADRANT OF RIGHT BREAST OF FEMALE, ESTROGEN RECEPTOR POSITIVE (HCC): ICD-10-CM

## 2023-10-07 DIAGNOSIS — G44.221 CHRONIC TENSION-TYPE HEADACHE, INTRACTABLE: ICD-10-CM

## 2023-10-09 RX ORDER — DULOXETIN HYDROCHLORIDE 60 MG/1
CAPSULE, DELAYED RELEASE ORAL
Qty: 30 CAPSULE | Refills: 2 | Status: SHIPPED | OUTPATIENT
Start: 2023-10-09

## 2023-10-09 RX ORDER — DULOXETIN HYDROCHLORIDE 30 MG/1
CAPSULE, DELAYED RELEASE ORAL
Qty: 90 CAPSULE | Refills: 1 | Status: SHIPPED | OUTPATIENT
Start: 2023-10-09

## 2023-10-09 NOTE — TELEPHONE ENCOUNTER
Jenna Walls is calling to request a refill on the following medication(s):    Last Visit Date (If Applicable):  2/00/3758    Next Visit Date:    Visit date not found    Medication Request:  Requested Prescriptions     Pending Prescriptions Disp Refills    DULoxetine (CYMBALTA) 30 MG extended release capsule [Pharmacy Med Name: DULOXETINE HCL DR 30 MG CAP] 90 capsule 1     Sig: take 1 capsule by mouth once daily    DULoxetine (CYMBALTA) 60 MG extended release capsule [Pharmacy Med Name: DULOXETINE HCL DR 60 MG CAP] 30 capsule 2     Sig: take 1 capsule by mouth once daily

## 2023-10-16 ENCOUNTER — PATIENT MESSAGE (OUTPATIENT)
Dept: FAMILY MEDICINE CLINIC | Age: 58
End: 2023-10-16

## 2023-10-16 DIAGNOSIS — Z17.0 MALIGNANT NEOPLASM OF LOWER-OUTER QUADRANT OF RIGHT BREAST OF FEMALE, ESTROGEN RECEPTOR POSITIVE (HCC): ICD-10-CM

## 2023-10-16 DIAGNOSIS — T45.1X5A CHEMOTHERAPY-INDUCED PERIPHERAL NEUROPATHY (HCC): ICD-10-CM

## 2023-10-16 DIAGNOSIS — G62.0 CHEMOTHERAPY-INDUCED PERIPHERAL NEUROPATHY (HCC): ICD-10-CM

## 2023-10-16 DIAGNOSIS — L27.1 CHEMOTHERAPY-INDUCED ACRAL ERYTHEMA: ICD-10-CM

## 2023-10-16 DIAGNOSIS — K52.1 CHEMOTHERAPY INDUCED DIARRHEA: Primary | ICD-10-CM

## 2023-10-16 DIAGNOSIS — C50.511 MALIGNANT NEOPLASM OF LOWER-OUTER QUADRANT OF RIGHT BREAST OF FEMALE, ESTROGEN RECEPTOR POSITIVE (HCC): ICD-10-CM

## 2023-10-16 DIAGNOSIS — T45.1X5A CHEMOTHERAPY INDUCED DIARRHEA: Primary | ICD-10-CM

## 2023-10-16 NOTE — TELEPHONE ENCOUNTER
From: Aracely Fermin  To: Dr. Mirta Herr  Sent: 10/16/2023 11:06 AM EDT  Subject: [employer] Denial     Good morning,  [employer] denied my continuation of my disability claim. Avinash Galaviz the claims rep stated they left several messages at your office; I am sure you did not get them. Anyway, the letter states in short, I have never been evaluated by a zuri doctor. Any way you can get me in to see one? I have a call into my  to see what I can do seeing the letter states I need to go back to work. I have an appt. with Dr Hermelindo Strange this Wednesday. I am trying not to cry and freak out; any help as always is appreciated.     Marissa

## 2023-10-19 DIAGNOSIS — L27.1 CHEMOTHERAPY-INDUCED ACRAL ERYTHEMA: ICD-10-CM

## 2023-10-19 DIAGNOSIS — T45.1X5A CHEMOTHERAPY-INDUCED PERIPHERAL NEUROPATHY (HCC): ICD-10-CM

## 2023-10-19 DIAGNOSIS — Z17.0 MALIGNANT NEOPLASM OF UPPER-OUTER QUADRANT OF RIGHT BREAST IN FEMALE, ESTROGEN RECEPTOR POSITIVE (HCC): ICD-10-CM

## 2023-10-19 DIAGNOSIS — G62.0 CHEMOTHERAPY-INDUCED PERIPHERAL NEUROPATHY (HCC): ICD-10-CM

## 2023-10-19 DIAGNOSIS — C50.411 MALIGNANT NEOPLASM OF UPPER-OUTER QUADRANT OF RIGHT BREAST IN FEMALE, ESTROGEN RECEPTOR POSITIVE (HCC): ICD-10-CM

## 2023-10-19 RX ORDER — OXYCODONE AND ACETAMINOPHEN 10; 325 MG/1; MG/1
1 TABLET ORAL DAILY
Qty: 30 TABLET | Refills: 0 | Status: SHIPPED | OUTPATIENT
Start: 2023-10-19 | End: 2023-11-18

## 2023-10-24 ENCOUNTER — PATIENT MESSAGE (OUTPATIENT)
Dept: FAMILY MEDICINE CLINIC | Age: 58
End: 2023-10-24

## 2023-10-24 NOTE — TELEPHONE ENCOUNTER
From: Shahzad Hummel  To: Dr. Jon Robbins  Sent: 10/24/2023 11:53 AM EDT  Subject: Dr. Nina Christie,  I saw Dr. Salvatore Lopez this morning and he is in total agreement with you that I should be off work. Please see attached. I will be going back to his office tomorrow for an EMG/NCV. Dr. Salvatore Lopez is also starting me on Pregabalin at night. I talk with [place] this afternoon, and I will let you know if they need anything from you.     Thank you for all your help,  Marissa

## 2023-10-30 DIAGNOSIS — T45.1X5A CHEMOTHERAPY INDUCED DIARRHEA: ICD-10-CM

## 2023-10-30 DIAGNOSIS — L27.1 CHEMOTHERAPY-INDUCED ACRAL ERYTHEMA: ICD-10-CM

## 2023-10-30 DIAGNOSIS — K52.1 CHEMOTHERAPY INDUCED DIARRHEA: ICD-10-CM

## 2023-10-31 RX ORDER — OXYCODONE AND ACETAMINOPHEN 7.5; 325 MG/1; MG/1
1 TABLET ORAL 2 TIMES DAILY PRN
Qty: 60 TABLET | Refills: 0 | Status: SHIPPED | OUTPATIENT
Start: 2023-10-31 | End: 2023-12-05

## 2023-11-16 ENCOUNTER — PATIENT MESSAGE (OUTPATIENT)
Dept: FAMILY MEDICINE CLINIC | Age: 58
End: 2023-11-16

## 2023-11-16 DIAGNOSIS — C50.411 MALIGNANT NEOPLASM OF UPPER-OUTER QUADRANT OF RIGHT BREAST IN FEMALE, ESTROGEN RECEPTOR POSITIVE (HCC): ICD-10-CM

## 2023-11-16 DIAGNOSIS — L27.1 CHEMOTHERAPY-INDUCED ACRAL ERYTHEMA: ICD-10-CM

## 2023-11-16 DIAGNOSIS — Z17.0 MALIGNANT NEOPLASM OF UPPER-OUTER QUADRANT OF RIGHT BREAST IN FEMALE, ESTROGEN RECEPTOR POSITIVE (HCC): ICD-10-CM

## 2023-11-16 DIAGNOSIS — T45.1X5A CHEMOTHERAPY-INDUCED PERIPHERAL NEUROPATHY (HCC): ICD-10-CM

## 2023-11-16 DIAGNOSIS — G62.0 CHEMOTHERAPY-INDUCED PERIPHERAL NEUROPATHY (HCC): ICD-10-CM

## 2023-11-16 RX ORDER — OXYCODONE AND ACETAMINOPHEN 10; 325 MG/1; MG/1
1 TABLET ORAL DAILY
Qty: 30 TABLET | Refills: 0 | Status: SHIPPED | OUTPATIENT
Start: 2023-11-16 | End: 2023-12-16

## 2023-11-27 ENCOUNTER — PATIENT MESSAGE (OUTPATIENT)
Dept: FAMILY MEDICINE CLINIC | Age: 58
End: 2023-11-27

## 2023-11-27 DIAGNOSIS — K52.1 CHEMOTHERAPY INDUCED DIARRHEA: ICD-10-CM

## 2023-11-27 DIAGNOSIS — T45.1X5A CHEMOTHERAPY INDUCED DIARRHEA: ICD-10-CM

## 2023-11-27 DIAGNOSIS — L27.1 CHEMOTHERAPY-INDUCED ACRAL ERYTHEMA: ICD-10-CM

## 2023-11-27 RX ORDER — OXYCODONE AND ACETAMINOPHEN 7.5; 325 MG/1; MG/1
1 TABLET ORAL 2 TIMES DAILY PRN
Qty: 60 TABLET | Refills: 0 | Status: SHIPPED | OUTPATIENT
Start: 2023-11-27 | End: 2024-01-01

## 2023-11-27 NOTE — TELEPHONE ENCOUNTER
From: Karla Blackman  To: Dr. Kate Benavides  Sent: 11/27/2023 4:19 PM EST  Subject: Long Term Disability Form    Hi,    [employer] overturned their denial and have approved me for Short Term Disability thru 11/30/2023. After that my Long-Term Disability hopefully will be approved. Attached is a form that needs to be completed and faxed back to [address] [place], [place] [address] , I also attached the cover sheet that needs to be faxed with the completed form. Can you let me know when you fax the form.   Thank you for all your help,  Marissa

## 2023-12-07 ENCOUNTER — PATIENT MESSAGE (OUTPATIENT)
Dept: FAMILY MEDICINE CLINIC | Age: 58
End: 2023-12-07

## 2023-12-07 NOTE — TELEPHONE ENCOUNTER
From: Karla Blackman  To: Dr. Kate Benavides  Sent: 12/7/2023 8:36 AM EST  Subject: Paper Work    Good morning,  Attached is another form I need completed, sorry! This form is for my employer seeing my Short-Term Disability ran out the end of November and I am now approved for Long Term Disability. I had to split in 2. Can you please complete this form showing I am unable to return to work. Please fax the completed form to [phone].   Thank you again for all your help,  Marissa

## 2023-12-13 DIAGNOSIS — Z17.0 MALIGNANT NEOPLASM OF UPPER-OUTER QUADRANT OF RIGHT BREAST IN FEMALE, ESTROGEN RECEPTOR POSITIVE (HCC): ICD-10-CM

## 2023-12-13 DIAGNOSIS — C50.411 MALIGNANT NEOPLASM OF UPPER-OUTER QUADRANT OF RIGHT BREAST IN FEMALE, ESTROGEN RECEPTOR POSITIVE (HCC): ICD-10-CM

## 2023-12-13 DIAGNOSIS — T45.1X5A CHEMOTHERAPY-INDUCED PERIPHERAL NEUROPATHY (HCC): ICD-10-CM

## 2023-12-13 DIAGNOSIS — G62.0 CHEMOTHERAPY-INDUCED PERIPHERAL NEUROPATHY (HCC): ICD-10-CM

## 2023-12-13 DIAGNOSIS — L27.1 CHEMOTHERAPY-INDUCED ACRAL ERYTHEMA: ICD-10-CM

## 2023-12-13 RX ORDER — OXYCODONE AND ACETAMINOPHEN 10; 325 MG/1; MG/1
1 TABLET ORAL DAILY
Qty: 30 TABLET | Refills: 0 | Status: SHIPPED | OUTPATIENT
Start: 2023-12-13 | End: 2024-01-12

## 2023-12-26 ENCOUNTER — TELEPHONE (OUTPATIENT)
Dept: FAMILY MEDICINE CLINIC | Age: 58
End: 2023-12-26

## 2023-12-26 NOTE — TELEPHONE ENCOUNTER
Pharmacy called stating patient has 18 days left of the endocet and pharmacy wants to know if the patient is on the 10s and the 7.5 percocet or one of the other. Please advise.

## 2023-12-26 NOTE — TELEPHONE ENCOUNTER
Well she apparently gets 60 of the 7.5 to get filled on the 27th of each month and she gets 30 of the tens did get filled on the 18th of each month so if there is a total of 90 Oxy in the 2 different strengths she gets each month

## 2023-12-28 PROBLEM — C80.1 NEUROPATHY ASSOCIATED WITH CANCER (HCC): Status: ACTIVE | Noted: 2019-08-16

## 2023-12-28 PROBLEM — G63 NEUROPATHY ASSOCIATED WITH CANCER (HCC): Status: ACTIVE | Noted: 2019-08-16

## 2023-12-28 NOTE — TELEPHONE ENCOUNTER
Spoke to Stevan at [facility] and gave confirmation that Jessica Boyd is taking both medications. She is getting both strengths for a 30 day supply. Oxycodone 7.5-325 m tablet twice a day.   Oxycodone  m tablet once a day

## 2024-01-01 DIAGNOSIS — C50.511 MALIGNANT NEOPLASM OF LOWER-OUTER QUADRANT OF RIGHT BREAST OF FEMALE, ESTROGEN RECEPTOR POSITIVE (HCC): ICD-10-CM

## 2024-01-01 DIAGNOSIS — G44.221 CHRONIC TENSION-TYPE HEADACHE, INTRACTABLE: ICD-10-CM

## 2024-01-01 DIAGNOSIS — Z17.0 MALIGNANT NEOPLASM OF LOWER-OUTER QUADRANT OF RIGHT BREAST OF FEMALE, ESTROGEN RECEPTOR POSITIVE (HCC): ICD-10-CM

## 2024-01-02 RX ORDER — DULOXETIN HYDROCHLORIDE 60 MG/1
CAPSULE, DELAYED RELEASE ORAL
Qty: 30 CAPSULE | Refills: 2 | Status: SHIPPED | OUTPATIENT
Start: 2024-01-02

## 2024-01-02 NOTE — TELEPHONE ENCOUNTER
(LASIX) 20 MG tablet Take 1 tablet by mouth daily (Patient not taking: Reported on 12/10/2020) 10 tablet 0    lidocaine (LMX) 4 % cream Apply thin layer to hands 30 minutes prior to showering 45 g 1    tacrolimus (PROTOPIC) 0.1 % ointment Apply to rash on face twice daily 30 g 2    lidocaine-prilocaine (EMLA) 2.5-2.5 % cream Apply topically Daily as needed. (Patient not taking: Reported on 3/23/2021) 1 Tube 1     No current facility-administered medications on file prior to visit.

## 2024-01-11 DIAGNOSIS — L27.1 CHEMOTHERAPY-INDUCED ACRAL ERYTHEMA: ICD-10-CM

## 2024-01-11 DIAGNOSIS — G62.0 CHEMOTHERAPY-INDUCED PERIPHERAL NEUROPATHY (HCC): ICD-10-CM

## 2024-01-11 DIAGNOSIS — T45.1X5A CHEMOTHERAPY-INDUCED PERIPHERAL NEUROPATHY (HCC): ICD-10-CM

## 2024-01-11 DIAGNOSIS — Z17.0 MALIGNANT NEOPLASM OF UPPER-OUTER QUADRANT OF RIGHT BREAST IN FEMALE, ESTROGEN RECEPTOR POSITIVE (HCC): ICD-10-CM

## 2024-01-11 DIAGNOSIS — C50.411 MALIGNANT NEOPLASM OF UPPER-OUTER QUADRANT OF RIGHT BREAST IN FEMALE, ESTROGEN RECEPTOR POSITIVE (HCC): ICD-10-CM

## 2024-01-11 RX ORDER — OXYCODONE AND ACETAMINOPHEN 10; 325 MG/1; MG/1
1 TABLET ORAL DAILY
Qty: 30 TABLET | Refills: 0 | Status: SHIPPED | OUTPATIENT
Start: 2024-01-11 | End: 2024-02-10

## 2024-01-24 DIAGNOSIS — K52.1 CHEMOTHERAPY INDUCED DIARRHEA: ICD-10-CM

## 2024-01-24 DIAGNOSIS — T45.1X5A CHEMOTHERAPY INDUCED DIARRHEA: ICD-10-CM

## 2024-01-24 DIAGNOSIS — L27.1 CHEMOTHERAPY-INDUCED ACRAL ERYTHEMA: ICD-10-CM

## 2024-01-24 RX ORDER — OXYCODONE AND ACETAMINOPHEN 7.5; 325 MG/1; MG/1
1 TABLET ORAL 2 TIMES DAILY PRN
Qty: 60 TABLET | Refills: 0 | Status: SHIPPED | OUTPATIENT
Start: 2024-01-24 | End: 2024-02-28

## 2024-02-04 DIAGNOSIS — Z72.0 TOBACCO ABUSE: ICD-10-CM

## 2024-02-05 DIAGNOSIS — Z72.0 TOBACCO ABUSE: ICD-10-CM

## 2024-02-05 RX ORDER — ALBUTEROL SULFATE 90 UG/1
AEROSOL, METERED RESPIRATORY (INHALATION)
Qty: 18 G | Refills: 3 | Status: SHIPPED | OUTPATIENT
Start: 2024-02-05

## 2024-02-09 DIAGNOSIS — G62.0 CHEMOTHERAPY-INDUCED PERIPHERAL NEUROPATHY (HCC): ICD-10-CM

## 2024-02-09 DIAGNOSIS — Z17.0 MALIGNANT NEOPLASM OF UPPER-OUTER QUADRANT OF RIGHT BREAST IN FEMALE, ESTROGEN RECEPTOR POSITIVE (HCC): ICD-10-CM

## 2024-02-09 DIAGNOSIS — L27.1 CHEMOTHERAPY-INDUCED ACRAL ERYTHEMA: ICD-10-CM

## 2024-02-09 DIAGNOSIS — C50.411 MALIGNANT NEOPLASM OF UPPER-OUTER QUADRANT OF RIGHT BREAST IN FEMALE, ESTROGEN RECEPTOR POSITIVE (HCC): ICD-10-CM

## 2024-02-09 DIAGNOSIS — T45.1X5A CHEMOTHERAPY-INDUCED PERIPHERAL NEUROPATHY (HCC): ICD-10-CM

## 2024-02-09 RX ORDER — OXYCODONE AND ACETAMINOPHEN 10; 325 MG/1; MG/1
1 TABLET ORAL DAILY
Qty: 30 TABLET | Refills: 0 | Status: SHIPPED | OUTPATIENT
Start: 2024-02-09 | End: 2024-03-10

## 2024-02-09 NOTE — TELEPHONE ENCOUNTER
Ruma Rascon is calling to request a refill on the following medication(s):    Last Visit Date (If Applicable):  12/19/2023    Next Visit Date:    Visit date not found    Medication Request:  Requested Prescriptions     Pending Prescriptions Disp Refills    oxyCODONE-acetaminophen (ENDOCET)  MG per tablet 30 tablet 0     Sig: Take 1 tablet by mouth daily for 30 days.

## 2024-02-21 DIAGNOSIS — T45.1X5A CHEMOTHERAPY INDUCED DIARRHEA: ICD-10-CM

## 2024-02-21 DIAGNOSIS — L27.1 CHEMOTHERAPY-INDUCED ACRAL ERYTHEMA: ICD-10-CM

## 2024-02-21 DIAGNOSIS — K52.1 CHEMOTHERAPY INDUCED DIARRHEA: ICD-10-CM

## 2024-02-21 RX ORDER — OXYCODONE AND ACETAMINOPHEN 7.5; 325 MG/1; MG/1
1 TABLET ORAL 2 TIMES DAILY PRN
Qty: 60 TABLET | Refills: 0 | Status: SHIPPED | OUTPATIENT
Start: 2024-02-21 | End: 2024-03-28

## 2024-03-06 DIAGNOSIS — C50.411 MALIGNANT NEOPLASM OF UPPER-OUTER QUADRANT OF RIGHT BREAST IN FEMALE, ESTROGEN RECEPTOR POSITIVE (HCC): ICD-10-CM

## 2024-03-06 DIAGNOSIS — G62.0 CHEMOTHERAPY-INDUCED PERIPHERAL NEUROPATHY (HCC): ICD-10-CM

## 2024-03-06 DIAGNOSIS — L27.1 CHEMOTHERAPY-INDUCED ACRAL ERYTHEMA: ICD-10-CM

## 2024-03-06 DIAGNOSIS — Z17.0 MALIGNANT NEOPLASM OF UPPER-OUTER QUADRANT OF RIGHT BREAST IN FEMALE, ESTROGEN RECEPTOR POSITIVE (HCC): ICD-10-CM

## 2024-03-06 DIAGNOSIS — T45.1X5A CHEMOTHERAPY-INDUCED PERIPHERAL NEUROPATHY (HCC): ICD-10-CM

## 2024-03-06 RX ORDER — OXYCODONE AND ACETAMINOPHEN 10; 325 MG/1; MG/1
1 TABLET ORAL DAILY
Qty: 30 TABLET | Refills: 0 | Status: SHIPPED | OUTPATIENT
Start: 2024-03-06 | End: 2024-04-05

## 2024-03-18 DIAGNOSIS — T45.1X5A CHEMOTHERAPY INDUCED DIARRHEA: ICD-10-CM

## 2024-03-18 DIAGNOSIS — L27.1 CHEMOTHERAPY-INDUCED ACRAL ERYTHEMA: ICD-10-CM

## 2024-03-18 DIAGNOSIS — K52.1 CHEMOTHERAPY INDUCED DIARRHEA: ICD-10-CM

## 2024-03-18 RX ORDER — ATORVASTATIN CALCIUM 10 MG/1
TABLET, FILM COATED ORAL
Qty: 90 TABLET | Refills: 0 | Status: SHIPPED | OUTPATIENT
Start: 2024-03-18

## 2024-03-18 RX ORDER — OXYCODONE AND ACETAMINOPHEN 7.5; 325 MG/1; MG/1
1 TABLET ORAL 2 TIMES DAILY PRN
Qty: 60 TABLET | Refills: 0 | Status: SHIPPED | OUTPATIENT
Start: 2024-03-18 | End: 2024-04-18 | Stop reason: SDUPTHER

## 2024-03-25 ASSESSMENT — PATIENT HEALTH QUESTIONNAIRE - PHQ9
SUM OF ALL RESPONSES TO PHQ9 QUESTIONS 1 & 2: 2
1. LITTLE INTEREST OR PLEASURE IN DOING THINGS: SEVERAL DAYS
SUM OF ALL RESPONSES TO PHQ QUESTIONS 1-9: 2
2. FEELING DOWN, DEPRESSED OR HOPELESS: SEVERAL DAYS
1. LITTLE INTEREST OR PLEASURE IN DOING THINGS: SEVERAL DAYS
SUM OF ALL RESPONSES TO PHQ QUESTIONS 1-9: 2
2. FEELING DOWN, DEPRESSED OR HOPELESS: SEVERAL DAYS
SUM OF ALL RESPONSES TO PHQ9 QUESTIONS 1 & 2: 2
SUM OF ALL RESPONSES TO PHQ QUESTIONS 1-9: 2
SUM OF ALL RESPONSES TO PHQ QUESTIONS 1-9: 2

## 2024-03-26 ENCOUNTER — OFFICE VISIT (OUTPATIENT)
Dept: FAMILY MEDICINE CLINIC | Age: 59
End: 2024-03-26
Payer: COMMERCIAL

## 2024-03-26 VITALS
TEMPERATURE: 97.1 F | DIASTOLIC BLOOD PRESSURE: 88 MMHG | BODY MASS INDEX: 33.69 KG/M2 | OXYGEN SATURATION: 99 % | SYSTOLIC BLOOD PRESSURE: 138 MMHG | HEART RATE: 100 BPM | WEIGHT: 190.2 LBS

## 2024-03-26 DIAGNOSIS — L27.1 CHEMOTHERAPY-INDUCED ACRAL ERYTHEMA: ICD-10-CM

## 2024-03-26 DIAGNOSIS — T45.1X5A CHEMOTHERAPY-INDUCED PERIPHERAL NEUROPATHY (HCC): Primary | ICD-10-CM

## 2024-03-26 DIAGNOSIS — G62.0 CHEMOTHERAPY-INDUCED PERIPHERAL NEUROPATHY (HCC): Primary | ICD-10-CM

## 2024-03-26 DIAGNOSIS — C50.411 MALIGNANT NEOPLASM OF UPPER-OUTER QUADRANT OF RIGHT BREAST IN FEMALE, ESTROGEN RECEPTOR POSITIVE (HCC): ICD-10-CM

## 2024-03-26 DIAGNOSIS — Z17.0 MALIGNANT NEOPLASM OF UPPER-OUTER QUADRANT OF RIGHT BREAST IN FEMALE, ESTROGEN RECEPTOR POSITIVE (HCC): ICD-10-CM

## 2024-03-26 PROCEDURE — 3079F DIAST BP 80-89 MM HG: CPT | Performed by: FAMILY MEDICINE

## 2024-03-26 PROCEDURE — 99213 OFFICE O/P EST LOW 20 MIN: CPT | Performed by: FAMILY MEDICINE

## 2024-03-26 PROCEDURE — 3075F SYST BP GE 130 - 139MM HG: CPT | Performed by: FAMILY MEDICINE

## 2024-03-26 NOTE — PROGRESS NOTES
General FM note    Ruma Rascon is a 59 y.o. female who presents today for follow up on her  medical conditions as noted below.  Ruma Rascon is c/o of   Chief Complaint   Patient presents with    Pain       Patient Active Problem List:     H/O severe sun exposure     Chronic tension-type headache, intractable     Essential hypertension     Microscopic hematuria     Lower abdominal pain     Malignant neoplasm of upper-outer quadrant of breast in female, estrogen receptor positive (HCC)     Chemotherapy induced diarrhea     Chemotherapy-induced acral erythema     Chemotherapy-induced peripheral neuropathy (HCC)     Neuropathy associated with cancer (HCC)     Past Medical History:   Diagnosis Date    Anxiety     Asthma     Chemotherapy-induced peripheral neuropathy (HCC) 9/28/2023    Depression     Headache     Hyperlipidemia     Hypertension     Malignant neoplasm of upper-outer quadrant of breast in female, estrogen receptor positive (HCC) 8/7/2018      Past Surgical History:   Procedure Laterality Date    BLADDER SURGERY  2000    BREAST SURGERY      cyst removed left breast    PARTIAL HYSTERECTOMY (CERVIX NOT REMOVED)      [place] [facility] CTR VAD W/SUBQ PORT AGE 4 YR/> Left 8/13/2018    PORT INSERTION WITH US AND FLUORO performed by Juan Gibson MD at [facility] OR    SALPINGO-OOPHORECTOMY Left     ectopic pregnancy treated by Dr. Torres    TONSILLECTOMY      TUNNELED VENOUS PORT PLACEMENT Left 08/13/2018    chemo port left chest     Family History   Problem Relation Age of Onset    Arthritis Mother         rheumatoid    Cancer Father         lung cancer    Other Father         circulatory issues    Breast Cancer Paternal Grandmother     Cancer Sister         skin cancer    Colon Cancer Other      Current Outpatient Medications   Medication Sig Dispense Refill    atorvastatin (LIPITOR) 10 MG tablet take 1 tablet by mouth once daily 90 tablet 0    oxyCODONE-acetaminophen (PERCOCET) 7.5-325 MG per tablet Take 1 tablet

## 2024-03-30 DIAGNOSIS — C50.511 MALIGNANT NEOPLASM OF LOWER-OUTER QUADRANT OF RIGHT BREAST OF FEMALE, ESTROGEN RECEPTOR POSITIVE (HCC): ICD-10-CM

## 2024-03-30 DIAGNOSIS — Z17.0 MALIGNANT NEOPLASM OF LOWER-OUTER QUADRANT OF RIGHT BREAST OF FEMALE, ESTROGEN RECEPTOR POSITIVE (HCC): ICD-10-CM

## 2024-03-30 DIAGNOSIS — G44.221 CHRONIC TENSION-TYPE HEADACHE, INTRACTABLE: ICD-10-CM

## 2024-04-01 RX ORDER — DULOXETIN HYDROCHLORIDE 60 MG/1
CAPSULE, DELAYED RELEASE ORAL
Qty: 90 CAPSULE | Refills: 1 | Status: SHIPPED | OUTPATIENT
Start: 2024-04-01

## 2024-04-01 RX ORDER — DULOXETIN HYDROCHLORIDE 30 MG/1
CAPSULE, DELAYED RELEASE ORAL
Qty: 90 CAPSULE | Refills: 1 | Status: SHIPPED | OUTPATIENT
Start: 2024-04-01

## 2024-04-05 DIAGNOSIS — Z17.0 MALIGNANT NEOPLASM OF UPPER-OUTER QUADRANT OF RIGHT BREAST IN FEMALE, ESTROGEN RECEPTOR POSITIVE (HCC): ICD-10-CM

## 2024-04-05 DIAGNOSIS — L27.1 CHEMOTHERAPY-INDUCED ACRAL ERYTHEMA: ICD-10-CM

## 2024-04-05 DIAGNOSIS — G62.0 CHEMOTHERAPY-INDUCED PERIPHERAL NEUROPATHY (HCC): ICD-10-CM

## 2024-04-05 DIAGNOSIS — C50.411 MALIGNANT NEOPLASM OF UPPER-OUTER QUADRANT OF RIGHT BREAST IN FEMALE, ESTROGEN RECEPTOR POSITIVE (HCC): ICD-10-CM

## 2024-04-05 DIAGNOSIS — T45.1X5A CHEMOTHERAPY-INDUCED PERIPHERAL NEUROPATHY (HCC): ICD-10-CM

## 2024-04-05 RX ORDER — OXYCODONE AND ACETAMINOPHEN 10; 325 MG/1; MG/1
1 TABLET ORAL DAILY
Qty: 30 TABLET | Refills: 0 | Status: SHIPPED | OUTPATIENT
Start: 2024-04-05 | End: 2024-05-05

## 2024-04-18 DIAGNOSIS — T45.1X5A CHEMOTHERAPY INDUCED DIARRHEA: ICD-10-CM

## 2024-04-18 DIAGNOSIS — K52.1 CHEMOTHERAPY INDUCED DIARRHEA: ICD-10-CM

## 2024-04-18 DIAGNOSIS — L27.1 CHEMOTHERAPY-INDUCED ACRAL ERYTHEMA: ICD-10-CM

## 2024-04-18 RX ORDER — OXYCODONE AND ACETAMINOPHEN 7.5; 325 MG/1; MG/1
1 TABLET ORAL 2 TIMES DAILY PRN
Qty: 60 TABLET | Refills: 0 | Status: SHIPPED | OUTPATIENT
Start: 2024-04-18 | End: 2024-05-24

## 2024-05-03 ENCOUNTER — PATIENT MESSAGE (OUTPATIENT)
Dept: FAMILY MEDICINE CLINIC | Age: 59
End: 2024-05-03

## 2024-05-03 DIAGNOSIS — T45.1X5A CHEMOTHERAPY-INDUCED PERIPHERAL NEUROPATHY (HCC): ICD-10-CM

## 2024-05-03 DIAGNOSIS — C50.411 MALIGNANT NEOPLASM OF UPPER-OUTER QUADRANT OF RIGHT BREAST IN FEMALE, ESTROGEN RECEPTOR POSITIVE (HCC): ICD-10-CM

## 2024-05-03 DIAGNOSIS — G62.0 CHEMOTHERAPY-INDUCED PERIPHERAL NEUROPATHY (HCC): ICD-10-CM

## 2024-05-03 DIAGNOSIS — L27.1 CHEMOTHERAPY-INDUCED ACRAL ERYTHEMA: ICD-10-CM

## 2024-05-03 DIAGNOSIS — Z17.0 MALIGNANT NEOPLASM OF UPPER-OUTER QUADRANT OF RIGHT BREAST IN FEMALE, ESTROGEN RECEPTOR POSITIVE (HCC): ICD-10-CM

## 2024-05-03 RX ORDER — OXYCODONE AND ACETAMINOPHEN 10; 325 MG/1; MG/1
1 TABLET ORAL DAILY
Qty: 30 TABLET | Refills: 0 | Status: SHIPPED | OUTPATIENT
Start: 2024-05-03 | End: 2024-06-02

## 2024-05-03 NOTE — TELEPHONE ENCOUNTER
Ruma Rascon is calling to request a refill on the following medication(s):    Last Visit Date (If Applicable):  3/26/2024    Next Visit Date:    Visit date not found    Medication Request:  Requested Prescriptions     Pending Prescriptions Disp Refills    oxyCODONE-acetaminophen (ENDOCET)  MG per tablet 30 tablet 0     Sig: Take 1 tablet by mouth daily for 30 days.

## 2024-05-06 NOTE — TELEPHONE ENCOUNTER
From: Ruma Rascon  To: Dr. Michelle Bruno  Sent: 5/3/2024 1:10 PM EDT  Subject: RX Refill    Hi,  I requested a refill on the 10mg pain meds this morning. I’m just checking to see if you are in the office today.     Thank you,  Marissa

## 2024-05-14 ENCOUNTER — PATIENT MESSAGE (OUTPATIENT)
Dept: FAMILY MEDICINE CLINIC | Age: 59
End: 2024-05-14

## 2024-05-14 NOTE — TELEPHONE ENCOUNTER
From: Ruma Rascon  To: Dr. Michelle Bruno  Sent: 5/14/2024 1:18 PM EDT  Subject: Pain Meds    Hi,  Pankaj and I are trying to get away for a long weekend. I know there a laws regarding my RX. My 7.5 were filled last month on the 18th. Can you send a refill request for the 7.5 with a note that it’s okay to fill early?  I’ll wait to here back from you.   Thank you,  Marissa

## 2024-05-15 DIAGNOSIS — T45.1X5A CHEMOTHERAPY INDUCED DIARRHEA: ICD-10-CM

## 2024-05-15 DIAGNOSIS — K52.1 CHEMOTHERAPY INDUCED DIARRHEA: ICD-10-CM

## 2024-05-15 DIAGNOSIS — L27.1 CHEMOTHERAPY-INDUCED ACRAL ERYTHEMA: ICD-10-CM

## 2024-05-15 RX ORDER — OXYCODONE AND ACETAMINOPHEN 7.5; 325 MG/1; MG/1
1 TABLET ORAL 2 TIMES DAILY PRN
Qty: 60 TABLET | Refills: 0 | Status: SHIPPED | OUTPATIENT
Start: 2024-05-15 | End: 2024-06-20

## 2024-05-15 NOTE — TELEPHONE ENCOUNTER
Ruma Rascon is calling to request a refill on the following medication(s):    Last Visit Date (If Applicable):  3/26/2024    Next Visit Date:    Visit date not found    Medication Request:  Requested Prescriptions     Pending Prescriptions Disp Refills    oxyCODONE-acetaminophen (PERCOCET) 7.5-325 MG per tablet 60 tablet 0     Sig: Take 1 tablet by mouth 2 times daily as needed for Pain for up to 60 doses. Intended supply: 30 days Max Daily Amount: 2 tablets

## 2024-05-30 DIAGNOSIS — Z17.0 MALIGNANT NEOPLASM OF UPPER-OUTER QUADRANT OF RIGHT BREAST IN FEMALE, ESTROGEN RECEPTOR POSITIVE (HCC): ICD-10-CM

## 2024-05-30 DIAGNOSIS — L27.1 CHEMOTHERAPY-INDUCED ACRAL ERYTHEMA: ICD-10-CM

## 2024-05-30 DIAGNOSIS — C50.411 MALIGNANT NEOPLASM OF UPPER-OUTER QUADRANT OF RIGHT BREAST IN FEMALE, ESTROGEN RECEPTOR POSITIVE (HCC): ICD-10-CM

## 2024-05-30 DIAGNOSIS — G62.0 CHEMOTHERAPY-INDUCED PERIPHERAL NEUROPATHY (HCC): ICD-10-CM

## 2024-05-30 DIAGNOSIS — T45.1X5A CHEMOTHERAPY-INDUCED PERIPHERAL NEUROPATHY (HCC): ICD-10-CM

## 2024-05-30 RX ORDER — OXYCODONE AND ACETAMINOPHEN 10; 325 MG/1; MG/1
1 TABLET ORAL DAILY
Qty: 30 TABLET | Refills: 0 | Status: SHIPPED | OUTPATIENT
Start: 2024-05-30 | End: 2024-06-29

## 2024-06-10 RX ORDER — ATORVASTATIN CALCIUM 10 MG/1
TABLET, FILM COATED ORAL
Qty: 90 TABLET | Refills: 0 | Status: SHIPPED | OUTPATIENT
Start: 2024-06-10

## 2024-06-13 DIAGNOSIS — T45.1X5A CHEMOTHERAPY INDUCED DIARRHEA: ICD-10-CM

## 2024-06-13 DIAGNOSIS — L27.1 CHEMOTHERAPY-INDUCED ACRAL ERYTHEMA: ICD-10-CM

## 2024-06-13 DIAGNOSIS — K52.1 CHEMOTHERAPY INDUCED DIARRHEA: ICD-10-CM

## 2024-06-13 RX ORDER — OXYCODONE AND ACETAMINOPHEN 7.5; 325 MG/1; MG/1
1 TABLET ORAL 2 TIMES DAILY PRN
Qty: 60 TABLET | Refills: 0 | OUTPATIENT
Start: 2024-06-13 | End: 2024-07-19

## 2024-06-13 NOTE — TELEPHONE ENCOUNTER
Ruma Rascon is calling to request a refill on the following medication(s):    Last Visit Date (If Applicable):  3/26/2024    Next Visit Date:    Visit date not found    Medication Request:  Requested Prescriptions     Pending Prescriptions Disp Refills    oxyCODONE-acetaminophen (PERCOCET) 7.5-325 MG per tablet 60 tablet 0     Sig: Take 1 tablet by mouth 2 times daily as needed for Pain for up to 60 doses. Intended supply: 30 days --please dispense 1 week earlier. Max Daily Amount: 2 tablets

## 2024-06-14 DIAGNOSIS — L27.1 CHEMOTHERAPY-INDUCED ACRAL ERYTHEMA: ICD-10-CM

## 2024-06-14 DIAGNOSIS — T45.1X5A CHEMOTHERAPY INDUCED DIARRHEA: ICD-10-CM

## 2024-06-14 DIAGNOSIS — K52.1 CHEMOTHERAPY INDUCED DIARRHEA: ICD-10-CM

## 2024-06-14 RX ORDER — OXYCODONE AND ACETAMINOPHEN 7.5; 325 MG/1; MG/1
1 TABLET ORAL 2 TIMES DAILY PRN
Qty: 60 TABLET | Refills: 0 | Status: CANCELLED | OUTPATIENT
Start: 2024-06-14 | End: 2024-07-20

## 2024-06-14 NOTE — TELEPHONE ENCOUNTER
Patient was informed of refill refusal and states that due to office being closed tomorrow, she needs prescription  sent to pharmacy today.      Ruma Rascon is calling to request a refill on the following medication(s):    Last Visit Date (If Applicable):  3/26/2024    Next Visit Date:    Visit date not found    Medication Request:  Requested Prescriptions     Pending Prescriptions Disp Refills    oxyCODONE-acetaminophen (PERCOCET) 7.5-325 MG per tablet 60 tablet 0     Sig: Take 1 tablet by mouth 2 times daily as needed for Pain for up to 60 doses. Intended supply: 30 days --please dispense 1 week earlier. Max Daily Amount: 2 tablets

## 2024-06-15 DIAGNOSIS — T45.1X5A CHEMOTHERAPY INDUCED DIARRHEA: ICD-10-CM

## 2024-06-15 DIAGNOSIS — K52.1 CHEMOTHERAPY INDUCED DIARRHEA: ICD-10-CM

## 2024-06-15 DIAGNOSIS — L27.1 CHEMOTHERAPY-INDUCED ACRAL ERYTHEMA: ICD-10-CM

## 2024-06-17 RX ORDER — OXYCODONE AND ACETAMINOPHEN 7.5; 325 MG/1; MG/1
TABLET ORAL
Qty: 60 TABLET | OUTPATIENT
Start: 2024-06-17

## 2024-06-17 RX ORDER — OXYCODONE AND ACETAMINOPHEN 7.5; 325 MG/1; MG/1
1 TABLET ORAL 2 TIMES DAILY PRN
Qty: 60 TABLET | Refills: 0 | Status: SHIPPED | OUTPATIENT
Start: 2024-06-17 | End: 2024-07-23

## 2024-06-28 DIAGNOSIS — T45.1X5A CHEMOTHERAPY-INDUCED PERIPHERAL NEUROPATHY (HCC): ICD-10-CM

## 2024-06-28 DIAGNOSIS — G62.0 CHEMOTHERAPY-INDUCED PERIPHERAL NEUROPATHY (HCC): ICD-10-CM

## 2024-06-28 DIAGNOSIS — Z17.0 MALIGNANT NEOPLASM OF UPPER-OUTER QUADRANT OF RIGHT BREAST IN FEMALE, ESTROGEN RECEPTOR POSITIVE (HCC): ICD-10-CM

## 2024-06-28 DIAGNOSIS — L27.1 CHEMOTHERAPY-INDUCED ACRAL ERYTHEMA: ICD-10-CM

## 2024-06-28 DIAGNOSIS — C50.411 MALIGNANT NEOPLASM OF UPPER-OUTER QUADRANT OF RIGHT BREAST IN FEMALE, ESTROGEN RECEPTOR POSITIVE (HCC): ICD-10-CM

## 2024-06-28 RX ORDER — OXYCODONE AND ACETAMINOPHEN 10; 325 MG/1; MG/1
1 TABLET ORAL DAILY
Qty: 30 TABLET | Refills: 0 | Status: SHIPPED | OUTPATIENT
Start: 2024-06-28 | End: 2024-07-28

## 2024-07-11 ENCOUNTER — PATIENT MESSAGE (OUTPATIENT)
Dept: FAMILY MEDICINE CLINIC | Age: 59
End: 2024-07-11

## 2024-07-11 DIAGNOSIS — Z17.0 MALIGNANT NEOPLASM OF UPPER-OUTER QUADRANT OF RIGHT BREAST IN FEMALE, ESTROGEN RECEPTOR POSITIVE (HCC): Primary | ICD-10-CM

## 2024-07-11 DIAGNOSIS — K52.1 CHEMOTHERAPY INDUCED DIARRHEA: ICD-10-CM

## 2024-07-11 DIAGNOSIS — L27.1 CHEMOTHERAPY-INDUCED ACRAL ERYTHEMA: ICD-10-CM

## 2024-07-11 DIAGNOSIS — T45.1X5A CHEMOTHERAPY INDUCED DIARRHEA: ICD-10-CM

## 2024-07-11 DIAGNOSIS — C80.1 NEUROPATHY ASSOCIATED WITH CANCER (HCC): ICD-10-CM

## 2024-07-11 DIAGNOSIS — C50.411 MALIGNANT NEOPLASM OF UPPER-OUTER QUADRANT OF RIGHT BREAST IN FEMALE, ESTROGEN RECEPTOR POSITIVE (HCC): Primary | ICD-10-CM

## 2024-07-11 DIAGNOSIS — G63 NEUROPATHY ASSOCIATED WITH CANCER (HCC): ICD-10-CM

## 2024-07-11 NOTE — TELEPHONE ENCOUNTER
From: Ruma Rascon  To: Dr. Michelle Bruno  Sent: 7/11/2024 9:43 AM EDT  Subject: 7.5 RX    Hi, I hope you gurosenda had a great vacation. I needed to switch my pharmacy from [facility] in [place] to [facility] in [place], I updated that information.  As you know we added the 10mg pain med in because I take 3 santino meds a day. I am getting ready for the 7.5 to refill and I wanted to see if we could make the 7.5 for 3x a day which would be 90 pills, and I would not refill the 10mg.   Also, my brother who is 65 was just diagnosed with Myofibrillar Myopathy, which he says is genetic. Once I get the genetic report from him, I will upload. I will talk to Dr Velásquez next week.  Thank you,  Marissa

## 2024-07-12 RX ORDER — OXYCODONE AND ACETAMINOPHEN 7.5; 325 MG/1; MG/1
1 TABLET ORAL 3 TIMES DAILY
Qty: 90 TABLET | Refills: 0 | Status: SHIPPED | OUTPATIENT
Start: 2024-07-12 | End: 2024-08-11

## 2024-08-08 DIAGNOSIS — T45.1X5A CHEMOTHERAPY INDUCED DIARRHEA: ICD-10-CM

## 2024-08-08 DIAGNOSIS — K52.1 CHEMOTHERAPY INDUCED DIARRHEA: ICD-10-CM

## 2024-08-08 DIAGNOSIS — C50.411 MALIGNANT NEOPLASM OF UPPER-OUTER QUADRANT OF RIGHT BREAST IN FEMALE, ESTROGEN RECEPTOR POSITIVE (HCC): ICD-10-CM

## 2024-08-08 DIAGNOSIS — Z17.0 MALIGNANT NEOPLASM OF UPPER-OUTER QUADRANT OF RIGHT BREAST IN FEMALE, ESTROGEN RECEPTOR POSITIVE (HCC): ICD-10-CM

## 2024-08-08 DIAGNOSIS — C80.1 NEUROPATHY ASSOCIATED WITH CANCER (HCC): ICD-10-CM

## 2024-08-08 DIAGNOSIS — G63 NEUROPATHY ASSOCIATED WITH CANCER (HCC): ICD-10-CM

## 2024-08-08 DIAGNOSIS — L27.1 CHEMOTHERAPY-INDUCED ACRAL ERYTHEMA: ICD-10-CM

## 2024-08-08 RX ORDER — OXYCODONE AND ACETAMINOPHEN 7.5; 325 MG/1; MG/1
1 TABLET ORAL 3 TIMES DAILY
Qty: 90 TABLET | Refills: 0 | Status: SHIPPED | OUTPATIENT
Start: 2024-08-08 | End: 2024-09-07

## 2024-08-19 DIAGNOSIS — Z72.0 TOBACCO ABUSE: ICD-10-CM

## 2024-08-20 RX ORDER — ALBUTEROL SULFATE 90 UG/1
AEROSOL, METERED RESPIRATORY (INHALATION)
Qty: 18 G | Refills: 3 | Status: SHIPPED | OUTPATIENT
Start: 2024-08-20

## 2024-09-04 DIAGNOSIS — T45.1X5A CHEMOTHERAPY INDUCED DIARRHEA: ICD-10-CM

## 2024-09-04 DIAGNOSIS — L27.1 CHEMOTHERAPY-INDUCED ACRAL ERYTHEMA: ICD-10-CM

## 2024-09-04 DIAGNOSIS — C50.411 MALIGNANT NEOPLASM OF UPPER-OUTER QUADRANT OF RIGHT BREAST IN FEMALE, ESTROGEN RECEPTOR POSITIVE (HCC): ICD-10-CM

## 2024-09-04 DIAGNOSIS — K52.1 CHEMOTHERAPY INDUCED DIARRHEA: ICD-10-CM

## 2024-09-04 DIAGNOSIS — Z17.0 MALIGNANT NEOPLASM OF UPPER-OUTER QUADRANT OF RIGHT BREAST IN FEMALE, ESTROGEN RECEPTOR POSITIVE (HCC): ICD-10-CM

## 2024-09-04 DIAGNOSIS — C80.1 NEUROPATHY ASSOCIATED WITH CANCER (HCC): ICD-10-CM

## 2024-09-04 DIAGNOSIS — G63 NEUROPATHY ASSOCIATED WITH CANCER (HCC): ICD-10-CM

## 2024-09-05 RX ORDER — OXYCODONE AND ACETAMINOPHEN 7.5; 325 MG/1; MG/1
1 TABLET ORAL 3 TIMES DAILY
Qty: 90 TABLET | Refills: 0 | Status: SHIPPED | OUTPATIENT
Start: 2024-09-05 | End: 2024-10-05

## 2024-09-23 ENCOUNTER — PATIENT MESSAGE (OUTPATIENT)
Dept: FAMILY MEDICINE CLINIC | Age: 59
End: 2024-09-23

## 2024-09-24 ENCOUNTER — TELEPHONE (OUTPATIENT)
Dept: FAMILY MEDICINE CLINIC | Age: 59
End: 2024-09-24

## 2024-09-30 ENCOUNTER — OFFICE VISIT (OUTPATIENT)
Dept: FAMILY MEDICINE CLINIC | Age: 59
End: 2024-09-30
Payer: COMMERCIAL

## 2024-09-30 VITALS
HEIGHT: 63 IN | OXYGEN SATURATION: 99 % | DIASTOLIC BLOOD PRESSURE: 85 MMHG | SYSTOLIC BLOOD PRESSURE: 138 MMHG | BODY MASS INDEX: 33.59 KG/M2 | TEMPERATURE: 97 F | HEART RATE: 105 BPM | WEIGHT: 189.6 LBS

## 2024-09-30 DIAGNOSIS — R73.03 PREDIABETES: ICD-10-CM

## 2024-09-30 DIAGNOSIS — I10 ESSENTIAL HYPERTENSION: Primary | ICD-10-CM

## 2024-09-30 DIAGNOSIS — Z13.1 DIABETES MELLITUS SCREENING: ICD-10-CM

## 2024-09-30 DIAGNOSIS — Z23 NEED FOR SHINGLES VACCINE: ICD-10-CM

## 2024-09-30 DIAGNOSIS — M25.562 ACUTE PAIN OF LEFT KNEE: ICD-10-CM

## 2024-09-30 PROCEDURE — 3075F SYST BP GE 130 - 139MM HG: CPT | Performed by: FAMILY MEDICINE

## 2024-09-30 PROCEDURE — 3079F DIAST BP 80-89 MM HG: CPT | Performed by: FAMILY MEDICINE

## 2024-09-30 PROCEDURE — 99214 OFFICE O/P EST MOD 30 MIN: CPT | Performed by: FAMILY MEDICINE

## 2024-09-30 RX ORDER — ZOSTER VACCINE RECOMBINANT, ADJUVANTED 50 MCG/0.5
0.5 KIT INTRAMUSCULAR SEE ADMIN INSTRUCTIONS
Qty: 0.5 ML | Refills: 0 | Status: SHIPPED | OUTPATIENT
Start: 2024-09-30 | End: 2025-03-29

## 2024-09-30 RX ORDER — LIDOCAINE/PRILOCAINE 2.5 %-2.5%
CREAM (GRAM) TOPICAL
Qty: 30 G | Refills: 1 | Status: SHIPPED | OUTPATIENT
Start: 2024-09-30

## 2024-09-30 RX ORDER — OXYCODONE AND ACETAMINOPHEN 10; 325 MG/1; MG/1
1 TABLET ORAL EVERY 6 HOURS PRN
Qty: 15 TABLET | Refills: 0 | Status: SHIPPED | OUTPATIENT
Start: 2024-09-30 | End: 2024-10-14

## 2024-09-30 SDOH — ECONOMIC STABILITY: FOOD INSECURITY: WITHIN THE PAST 12 MONTHS, THE FOOD YOU BOUGHT JUST DIDN'T LAST AND YOU DIDN'T HAVE MONEY TO GET MORE.: NEVER TRUE

## 2024-09-30 SDOH — ECONOMIC STABILITY: INCOME INSECURITY: HOW HARD IS IT FOR YOU TO PAY FOR THE VERY BASICS LIKE FOOD, HOUSING, MEDICAL CARE, AND HEATING?: NOT HARD AT ALL

## 2024-09-30 SDOH — ECONOMIC STABILITY: FOOD INSECURITY: WITHIN THE PAST 12 MONTHS, YOU WORRIED THAT YOUR FOOD WOULD RUN OUT BEFORE YOU GOT MONEY TO BUY MORE.: NEVER TRUE

## 2024-09-30 ASSESSMENT — PATIENT HEALTH QUESTIONNAIRE - PHQ9
1. LITTLE INTEREST OR PLEASURE IN DOING THINGS: NOT AT ALL
SUM OF ALL RESPONSES TO PHQ QUESTIONS 1-9: 0
SUM OF ALL RESPONSES TO PHQ QUESTIONS 1-9: 0
2. FEELING DOWN, DEPRESSED OR HOPELESS: NOT AT ALL
SUM OF ALL RESPONSES TO PHQ QUESTIONS 1-9: 0
SUM OF ALL RESPONSES TO PHQ9 QUESTIONS 1 & 2: 0
SUM OF ALL RESPONSES TO PHQ QUESTIONS 1-9: 0

## 2024-09-30 NOTE — PROGRESS NOTES
tablet 3    prochlorperazine (COMPAZINE) 10 MG tablet Take 1 tablet by mouth every 6 hours as needed      lidocaine (LMX) 4 % cream Apply thin layer to hands 30 minutes prior to showering 45 g 1    tacrolimus (PROTOPIC) 0.1 % ointment Apply to rash on face twice daily 30 g 2    valACYclovir (VALTREX) 1 g tablet Take 1 tablet by mouth 2 times daily (Patient not taking: Reported on 3/23/2021) 20 tablet 3    SUMAtriptan (IMITREX) 100 MG tablet Take 1 tablet by mouth once as needed for Migraine 9 tablet 3    triamcinolone acetonide (KENALOG) 0.1 % paste Apply to teeth 2 times daily. 5 g 1    furosemide (LASIX) 20 MG tablet Take 1 tablet by mouth daily (Patient not taking: Reported on 12/10/2020) 10 tablet 0    lidocaine-prilocaine (EMLA) 2.5-2.5 % cream Apply topically Daily as needed. (Patient not taking: Reported on 3/23/2021) 1 Tube 1     No current facility-administered medications for this visit.     ALLERGIES:    Allergies   Allergen Reactions    Dye [Iodides] Anaphylaxis    Egg-Derived Products Anaphylaxis    Iodine Anaphylaxis     Pt states she is not allergic to Iodine contrast just MRI contrast, [facility] 6/16/21, was given contrast on 6/66 with no pre-medication and pt had no reaction    Shellfish-Derived Products Anaphylaxis, Shortness Of Breath and Swelling       Social History     Tobacco Use    Smoking status: Every Day     Current packs/day: 1.00     Average packs/day: 1 pack/day for 40.0 years (40.0 ttl pk-yrs)     Types: Cigarettes    Smokeless tobacco: Never   Substance Use Topics    Alcohol use: No     Alcohol/week: 0.0 standard drinks of alcohol     Comment: social      Body mass index is 33.59 kg/m².  /85   Pulse (!) 105   Temp 97 °F (36.1 °C)   Ht 1.6 m (5' 2.99\")   Wt 86 kg (189 lb 9.6 oz)   SpO2 99%   BMI 33.59 kg/m²     Subjective:      HPI    59 y.o. female coming in today because of left knee pain for the last 3 weeks. Severe at L knee. Acute, stabbing pain not able to touch even put

## 2024-10-01 DIAGNOSIS — M25.562 ACUTE PAIN OF LEFT KNEE: ICD-10-CM

## 2024-10-03 DIAGNOSIS — G63 NEUROPATHY ASSOCIATED WITH CANCER (HCC): ICD-10-CM

## 2024-10-03 DIAGNOSIS — T45.1X5A CHEMOTHERAPY INDUCED DIARRHEA: ICD-10-CM

## 2024-10-03 DIAGNOSIS — K52.1 CHEMOTHERAPY INDUCED DIARRHEA: ICD-10-CM

## 2024-10-03 DIAGNOSIS — C50.411 MALIGNANT NEOPLASM OF UPPER-OUTER QUADRANT OF RIGHT BREAST IN FEMALE, ESTROGEN RECEPTOR POSITIVE (HCC): ICD-10-CM

## 2024-10-03 DIAGNOSIS — C80.1 NEUROPATHY ASSOCIATED WITH CANCER (HCC): ICD-10-CM

## 2024-10-03 DIAGNOSIS — L27.1 CHEMOTHERAPY-INDUCED ACRAL ERYTHEMA: ICD-10-CM

## 2024-10-03 DIAGNOSIS — Z17.0 MALIGNANT NEOPLASM OF UPPER-OUTER QUADRANT OF RIGHT BREAST IN FEMALE, ESTROGEN RECEPTOR POSITIVE (HCC): ICD-10-CM

## 2024-10-04 ENCOUNTER — PATIENT MESSAGE (OUTPATIENT)
Dept: FAMILY MEDICINE CLINIC | Age: 59
End: 2024-10-04

## 2024-10-04 DIAGNOSIS — Z17.0 MALIGNANT NEOPLASM OF UPPER-OUTER QUADRANT OF RIGHT BREAST IN FEMALE, ESTROGEN RECEPTOR POSITIVE (HCC): ICD-10-CM

## 2024-10-04 DIAGNOSIS — K52.1 CHEMOTHERAPY INDUCED DIARRHEA: ICD-10-CM

## 2024-10-04 DIAGNOSIS — T45.1X5A CHEMOTHERAPY INDUCED DIARRHEA: ICD-10-CM

## 2024-10-04 DIAGNOSIS — G63 NEUROPATHY ASSOCIATED WITH CANCER (HCC): ICD-10-CM

## 2024-10-04 DIAGNOSIS — C80.1 NEUROPATHY ASSOCIATED WITH CANCER (HCC): ICD-10-CM

## 2024-10-04 DIAGNOSIS — L27.1 CHEMOTHERAPY-INDUCED ACRAL ERYTHEMA: ICD-10-CM

## 2024-10-04 DIAGNOSIS — C50.411 MALIGNANT NEOPLASM OF UPPER-OUTER QUADRANT OF RIGHT BREAST IN FEMALE, ESTROGEN RECEPTOR POSITIVE (HCC): ICD-10-CM

## 2024-10-04 LAB
CHOLESTEROL, TOTAL: NORMAL
CHOLESTEROL/HDL RATIO: NORMAL
ESTIMATED AVERAGE GLUCOSE: 114
HBA1C MFR BLD: 5.6 %
HDLC SERPL-MCNC: NORMAL MG/DL
LDL CHOLESTEROL: NORMAL
NONHDLC SERPL-MCNC: NORMAL MG/DL
TRIGL SERPL-MCNC: NORMAL MG/DL
VLDLC SERPL CALC-MCNC: NORMAL MG/DL

## 2024-10-04 RX ORDER — OXYCODONE AND ACETAMINOPHEN 7.5; 325 MG/1; MG/1
1 TABLET ORAL 3 TIMES DAILY
Qty: 90 TABLET | Refills: 0 | OUTPATIENT
Start: 2024-10-04 | End: 2024-11-03

## 2024-10-04 RX ORDER — OXYCODONE AND ACETAMINOPHEN 7.5; 325 MG/1; MG/1
1 TABLET ORAL 3 TIMES DAILY
Qty: 90 TABLET | Refills: 0 | Status: SHIPPED | OUTPATIENT
Start: 2024-10-04 | End: 2024-11-03

## 2024-10-04 RX ORDER — ATORVASTATIN CALCIUM 10 MG/1
TABLET, FILM COATED ORAL
Qty: 30 TABLET | Refills: 0 | Status: SHIPPED | OUTPATIENT
Start: 2024-10-04

## 2024-10-04 NOTE — TELEPHONE ENCOUNTER
Ruma Rascon is calling to request a refill on the following medication(s):    Last Visit Date (If Applicable):  9/30/2024    Next Visit Date:    10/14/2024    Medication Request:  Requested Prescriptions     Pending Prescriptions Disp Refills    atorvastatin (LIPITOR) 10 MG tablet [Pharmacy Med Name: Atorvastatin Calcium Oral Tablet 10 MG] 30 tablet 0     Sig: TAKE 1 TABLET BY MOUTH EVERY DAY

## 2024-10-05 ENCOUNTER — TELEPHONE (OUTPATIENT)
Dept: FAMILY MEDICINE CLINIC | Age: 59
End: 2024-10-05

## 2024-10-05 NOTE — TELEPHONE ENCOUNTER
Good morning.  I would like to send the patient to pain medication consultation.  I am not sure if you are able to do it but the patient has metastatic breast cancer has a lot of neuropathic pains.  Have been prescribing Percocets for strength which really does not make sense at the time as she has continues pain spikes.  Please let me know what you think thank you so much

## 2024-10-06 DIAGNOSIS — G44.221 CHRONIC TENSION-TYPE HEADACHE, INTRACTABLE: ICD-10-CM

## 2024-10-06 DIAGNOSIS — Z17.0 MALIGNANT NEOPLASM OF LOWER-OUTER QUADRANT OF RIGHT BREAST OF FEMALE, ESTROGEN RECEPTOR POSITIVE (HCC): ICD-10-CM

## 2024-10-06 DIAGNOSIS — C50.511 MALIGNANT NEOPLASM OF LOWER-OUTER QUADRANT OF RIGHT BREAST OF FEMALE, ESTROGEN RECEPTOR POSITIVE (HCC): ICD-10-CM

## 2024-10-07 DIAGNOSIS — I10 ESSENTIAL HYPERTENSION: ICD-10-CM

## 2024-10-07 DIAGNOSIS — G44.221 CHRONIC TENSION-TYPE HEADACHE, INTRACTABLE: ICD-10-CM

## 2024-10-07 DIAGNOSIS — C50.511 MALIGNANT NEOPLASM OF LOWER-OUTER QUADRANT OF RIGHT BREAST OF FEMALE, ESTROGEN RECEPTOR POSITIVE (HCC): ICD-10-CM

## 2024-10-07 DIAGNOSIS — R73.03 PREDIABETES: ICD-10-CM

## 2024-10-07 DIAGNOSIS — Z17.0 MALIGNANT NEOPLASM OF LOWER-OUTER QUADRANT OF RIGHT BREAST OF FEMALE, ESTROGEN RECEPTOR POSITIVE (HCC): ICD-10-CM

## 2024-10-07 DIAGNOSIS — Z13.1 DIABETES MELLITUS SCREENING: ICD-10-CM

## 2024-10-07 RX ORDER — DULOXETIN HYDROCHLORIDE 30 MG/1
CAPSULE, DELAYED RELEASE ORAL
Qty: 30 CAPSULE | Refills: 0 | Status: SHIPPED | OUTPATIENT
Start: 2024-10-07

## 2024-10-07 RX ORDER — DULOXETIN HYDROCHLORIDE 60 MG/1
CAPSULE, DELAYED RELEASE ORAL
Qty: 30 CAPSULE | Refills: 0 | Status: SHIPPED | OUTPATIENT
Start: 2024-10-07

## 2024-10-08 DIAGNOSIS — C80.1 NEUROPATHY ASSOCIATED WITH CANCER (HCC): Primary | ICD-10-CM

## 2024-10-08 DIAGNOSIS — C50.411 MALIGNANT NEOPLASM OF UPPER-OUTER QUADRANT OF RIGHT BREAST IN FEMALE, ESTROGEN RECEPTOR POSITIVE (HCC): ICD-10-CM

## 2024-10-08 DIAGNOSIS — G63 NEUROPATHY ASSOCIATED WITH CANCER (HCC): Primary | ICD-10-CM

## 2024-10-08 DIAGNOSIS — Z17.0 MALIGNANT NEOPLASM OF UPPER-OUTER QUADRANT OF RIGHT BREAST IN FEMALE, ESTROGEN RECEPTOR POSITIVE (HCC): ICD-10-CM

## 2024-10-08 RX ORDER — TOPIRAMATE 25 MG/1
25 TABLET, FILM COATED ORAL DAILY
Qty: 30 TABLET | Refills: 3 | Status: SHIPPED | OUTPATIENT
Start: 2024-10-08

## 2024-10-17 ENCOUNTER — PATIENT MESSAGE (OUTPATIENT)
Dept: FAMILY MEDICINE CLINIC | Age: 59
End: 2024-10-17

## 2024-11-01 RX ORDER — ATORVASTATIN CALCIUM 10 MG/1
TABLET, FILM COATED ORAL
Qty: 30 TABLET | Refills: 3 | Status: SHIPPED | OUTPATIENT
Start: 2024-11-01

## 2024-11-03 DIAGNOSIS — C50.511 MALIGNANT NEOPLASM OF LOWER-OUTER QUADRANT OF RIGHT BREAST OF FEMALE, ESTROGEN RECEPTOR POSITIVE (HCC): ICD-10-CM

## 2024-11-03 DIAGNOSIS — G44.221 CHRONIC TENSION-TYPE HEADACHE, INTRACTABLE: ICD-10-CM

## 2024-11-03 DIAGNOSIS — Z17.0 MALIGNANT NEOPLASM OF LOWER-OUTER QUADRANT OF RIGHT BREAST OF FEMALE, ESTROGEN RECEPTOR POSITIVE (HCC): ICD-10-CM

## 2024-11-04 ENCOUNTER — PATIENT MESSAGE (OUTPATIENT)
Dept: FAMILY MEDICINE CLINIC | Age: 59
End: 2024-11-04

## 2024-11-04 DIAGNOSIS — Z17.0 MALIGNANT NEOPLASM OF LOWER-OUTER QUADRANT OF RIGHT BREAST OF FEMALE, ESTROGEN RECEPTOR POSITIVE (HCC): Primary | ICD-10-CM

## 2024-11-04 DIAGNOSIS — C80.1 NEUROPATHY ASSOCIATED WITH CANCER (HCC): ICD-10-CM

## 2024-11-04 DIAGNOSIS — L27.1 CHEMOTHERAPY-INDUCED ACRAL ERYTHEMA: ICD-10-CM

## 2024-11-04 DIAGNOSIS — C50.511 MALIGNANT NEOPLASM OF LOWER-OUTER QUADRANT OF RIGHT BREAST OF FEMALE, ESTROGEN RECEPTOR POSITIVE (HCC): Primary | ICD-10-CM

## 2024-11-04 DIAGNOSIS — G63 NEUROPATHY ASSOCIATED WITH CANCER (HCC): ICD-10-CM

## 2024-11-04 RX ORDER — DULOXETIN HYDROCHLORIDE 30 MG/1
CAPSULE, DELAYED RELEASE ORAL
Qty: 30 CAPSULE | Refills: 0 | Status: SHIPPED | OUTPATIENT
Start: 2024-11-04

## 2024-11-04 RX ORDER — OXYCODONE AND ACETAMINOPHEN 7.5; 325 MG/1; MG/1
1 TABLET ORAL 3 TIMES DAILY
Qty: 90 TABLET | Refills: 0 | Status: SHIPPED | OUTPATIENT
Start: 2024-11-04 | End: 2024-12-04

## 2024-11-04 RX ORDER — DULOXETIN HYDROCHLORIDE 60 MG/1
CAPSULE, DELAYED RELEASE ORAL
Qty: 30 CAPSULE | Refills: 0 | Status: SHIPPED | OUTPATIENT
Start: 2024-11-04

## 2024-11-04 NOTE — TELEPHONE ENCOUNTER
Ruma Rascon is calling to request a refill on the following medication(s):    Last Visit Date (If Applicable):  9/30/2024    Next Visit Date:    Visit date not found    Medication Request:  Requested Prescriptions     Pending Prescriptions Disp Refills    DULoxetine (CYMBALTA) 60 MG extended release capsule [Pharmacy Med Name: DULoxetine HCl Oral Capsule Delayed Release Particles 60 MG] 30 capsule 0     Sig: TAKE 1 CAPSULE BY MOUTH ONCE DAILY    DULoxetine (CYMBALTA) 30 MG extended release capsule [Pharmacy Med Name: DULoxetine HCl Oral Capsule Delayed Release Particles 30 MG] 30 capsule 0     Sig: TAKE 1 CAPSULE BY MOUTH ONCE DAILY

## 2024-11-04 NOTE — TELEPHONE ENCOUNTER
Ruma Rascon is calling to request a refill on the following medication(s):    Last Visit Date (If Applicable):  9/30/2024    Next Visit Date:    Visit date not found    Medication Request:  Requested Prescriptions     Pending Prescriptions Disp Refills    oxyCODONE-acetaminophen (PERCOCET) 7.5-325 MG per tablet 90 tablet 0     Sig: Take 1 tablet by mouth 3 times daily for 30 days. Max Daily Amount: 3 tablets

## 2024-11-07 ENCOUNTER — PATIENT MESSAGE (OUTPATIENT)
Dept: FAMILY MEDICINE CLINIC | Age: 59
End: 2024-11-07

## 2024-11-08 ENCOUNTER — TELEMEDICINE (OUTPATIENT)
Dept: FAMILY MEDICINE CLINIC | Age: 59
End: 2024-11-08
Payer: COMMERCIAL

## 2024-11-08 DIAGNOSIS — G62.0 CHEMOTHERAPY-INDUCED PERIPHERAL NEUROPATHY (HCC): Primary | ICD-10-CM

## 2024-11-08 DIAGNOSIS — G63 NEUROPATHY ASSOCIATED WITH CANCER (HCC): ICD-10-CM

## 2024-11-08 DIAGNOSIS — C80.1 NEUROPATHY ASSOCIATED WITH CANCER (HCC): ICD-10-CM

## 2024-11-08 DIAGNOSIS — L27.1 CHEMOTHERAPY-INDUCED ACRAL ERYTHEMA: ICD-10-CM

## 2024-11-08 DIAGNOSIS — Z17.0 MALIGNANT NEOPLASM OF LOWER-OUTER QUADRANT OF RIGHT BREAST OF FEMALE, ESTROGEN RECEPTOR POSITIVE (HCC): ICD-10-CM

## 2024-11-08 DIAGNOSIS — C50.511 MALIGNANT NEOPLASM OF LOWER-OUTER QUADRANT OF RIGHT BREAST OF FEMALE, ESTROGEN RECEPTOR POSITIVE (HCC): ICD-10-CM

## 2024-11-08 DIAGNOSIS — T45.1X5A CHEMOTHERAPY-INDUCED PERIPHERAL NEUROPATHY (HCC): Primary | ICD-10-CM

## 2024-11-08 PROCEDURE — 99213 OFFICE O/P EST LOW 20 MIN: CPT | Performed by: FAMILY MEDICINE

## 2024-11-08 NOTE — PROGRESS NOTES
General FM note    TeleHealth encounter -- Audio/Visual (During COVID-19 public health emergency)    Ruma Rascon is a 59 y.o. female who presents today for follow up on her  medical conditions as noted below.  Ruma Rascon is c/o of   Chief Complaint   Patient presents with    Results     MRI    Knee Pain    Lower Back Pain       Patient Active Problem List:     H/O severe sun exposure     Chronic tension-type headache, intractable     Essential hypertension     Microscopic hematuria     Lower abdominal pain     Malignant neoplasm of upper-outer quadrant of breast in female, estrogen receptor positive (HCC)     Chemotherapy induced diarrhea     Chemotherapy-induced acral erythema     Chemotherapy-induced peripheral neuropathy (HCC)     Neuropathy associated with cancer (HCC)     Past Medical History:   Diagnosis Date    Anxiety     Asthma     Chemotherapy-induced peripheral neuropathy (HCC) 9/28/2023    Depression     Headache     Hyperlipidemia     Hypertension     Malignant neoplasm of upper-outer quadrant of breast in female, estrogen receptor positive (HCC) 8/7/2018      Past Surgical History:   Procedure Laterality Date    BLADDER SURGERY  2000    BREAST SURGERY      cyst removed left breast    PARTIAL HYSTERECTOMY (CERVIX NOT REMOVED)      [facility] CTR VAD W/SUBQ PORT AGE 4 YR/> Left 8/13/2018    PORT INSERTION WITH US AND FLUORO performed by Juan Gibson MD at [facility] OR    SALPINGO-OOPHORECTOMY Left     ectopic pregnancy treated by Dr. Torres    TONSILLECTOMY      TUNNELED VENOUS PORT PLACEMENT Left 08/13/2018    chemo port left chest     Family History   Problem Relation Age of Onset    Arthritis Mother         rheumatoid    Cancer Father         lung cancer    Other Father         circulatory issues    Breast Cancer Paternal Grandmother     Cancer Sister         skin cancer    Colon Cancer Other      Current Outpatient Medications   Medication Sig Dispense Refill    DULoxetine (CYMBALTA) 60 MG

## 2024-11-12 DIAGNOSIS — L27.1 CHEMOTHERAPY-INDUCED ACRAL ERYTHEMA: Primary | ICD-10-CM

## 2024-11-12 DIAGNOSIS — T45.1X5A CHEMOTHERAPY-INDUCED PERIPHERAL NEUROPATHY (HCC): ICD-10-CM

## 2024-11-12 DIAGNOSIS — G63 NEUROPATHY ASSOCIATED WITH CANCER (HCC): ICD-10-CM

## 2024-11-12 DIAGNOSIS — G62.0 CHEMOTHERAPY-INDUCED PERIPHERAL NEUROPATHY (HCC): ICD-10-CM

## 2024-11-12 DIAGNOSIS — C80.1 NEUROPATHY ASSOCIATED WITH CANCER (HCC): ICD-10-CM

## 2024-11-13 ENCOUNTER — PATIENT MESSAGE (OUTPATIENT)
Dept: FAMILY MEDICINE CLINIC | Age: 59
End: 2024-11-13

## 2024-11-18 ENCOUNTER — PATIENT MESSAGE (OUTPATIENT)
Dept: FAMILY MEDICINE CLINIC | Age: 59
End: 2024-11-18

## 2024-11-19 RX ORDER — PREDNISONE 20 MG/1
20 TABLET ORAL DAILY
Qty: 5 TABLET | Refills: 0 | Status: SHIPPED | OUTPATIENT
Start: 2024-11-19 | End: 2024-11-24

## 2024-11-29 ENCOUNTER — PATIENT MESSAGE (OUTPATIENT)
Dept: FAMILY MEDICINE CLINIC | Age: 59
End: 2024-11-29

## 2024-11-29 DIAGNOSIS — C50.511 MALIGNANT NEOPLASM OF LOWER-OUTER QUADRANT OF RIGHT BREAST OF FEMALE, ESTROGEN RECEPTOR POSITIVE (HCC): ICD-10-CM

## 2024-11-29 DIAGNOSIS — C80.1 NEUROPATHY ASSOCIATED WITH CANCER (HCC): ICD-10-CM

## 2024-11-29 DIAGNOSIS — L27.1 CHEMOTHERAPY-INDUCED ACRAL ERYTHEMA: ICD-10-CM

## 2024-11-29 DIAGNOSIS — Z17.0 MALIGNANT NEOPLASM OF LOWER-OUTER QUADRANT OF RIGHT BREAST OF FEMALE, ESTROGEN RECEPTOR POSITIVE (HCC): ICD-10-CM

## 2024-11-29 DIAGNOSIS — G63 NEUROPATHY ASSOCIATED WITH CANCER (HCC): ICD-10-CM

## 2024-11-29 RX ORDER — OXYCODONE AND ACETAMINOPHEN 7.5; 325 MG/1; MG/1
1 TABLET ORAL 3 TIMES DAILY
Qty: 90 TABLET | Refills: 0 | Status: CANCELLED | OUTPATIENT
Start: 2024-11-29 | End: 2024-12-29

## 2024-12-01 RX ORDER — OXYCODONE AND ACETAMINOPHEN 7.5; 325 MG/1; MG/1
1 TABLET ORAL 3 TIMES DAILY
Qty: 90 TABLET | Refills: 0 | Status: SHIPPED | OUTPATIENT
Start: 2024-12-01 | End: 2024-12-31

## 2024-12-11 DIAGNOSIS — Z17.0 MALIGNANT NEOPLASM OF LOWER-OUTER QUADRANT OF RIGHT BREAST OF FEMALE, ESTROGEN RECEPTOR POSITIVE (HCC): ICD-10-CM

## 2024-12-11 DIAGNOSIS — C50.511 MALIGNANT NEOPLASM OF LOWER-OUTER QUADRANT OF RIGHT BREAST OF FEMALE, ESTROGEN RECEPTOR POSITIVE (HCC): ICD-10-CM

## 2024-12-11 DIAGNOSIS — G44.221 CHRONIC TENSION-TYPE HEADACHE, INTRACTABLE: ICD-10-CM

## 2024-12-12 RX ORDER — DULOXETIN HYDROCHLORIDE 30 MG/1
CAPSULE, DELAYED RELEASE ORAL
Qty: 30 CAPSULE | Refills: 3 | Status: SHIPPED | OUTPATIENT
Start: 2024-12-12

## 2024-12-12 RX ORDER — DULOXETIN HYDROCHLORIDE 60 MG/1
CAPSULE, DELAYED RELEASE ORAL
Qty: 30 CAPSULE | Refills: 3 | Status: SHIPPED | OUTPATIENT
Start: 2024-12-12

## 2024-12-28 DIAGNOSIS — C80.1 NEUROPATHY ASSOCIATED WITH CANCER (HCC): ICD-10-CM

## 2024-12-28 DIAGNOSIS — C50.511 MALIGNANT NEOPLASM OF LOWER-OUTER QUADRANT OF RIGHT BREAST OF FEMALE, ESTROGEN RECEPTOR POSITIVE (HCC): Primary | ICD-10-CM

## 2024-12-28 DIAGNOSIS — L27.1 CHEMOTHERAPY-INDUCED ACRAL ERYTHEMA: ICD-10-CM

## 2024-12-28 DIAGNOSIS — R05.2 SUBACUTE COUGH: ICD-10-CM

## 2024-12-28 DIAGNOSIS — G63 NEUROPATHY ASSOCIATED WITH CANCER (HCC): ICD-10-CM

## 2024-12-28 DIAGNOSIS — Z17.0 MALIGNANT NEOPLASM OF LOWER-OUTER QUADRANT OF RIGHT BREAST OF FEMALE, ESTROGEN RECEPTOR POSITIVE (HCC): Primary | ICD-10-CM

## 2024-12-28 RX ORDER — OXYCODONE AND ACETAMINOPHEN 7.5; 325 MG/1; MG/1
1 TABLET ORAL 3 TIMES DAILY
Qty: 90 TABLET | Refills: 0 | Status: SHIPPED | OUTPATIENT
Start: 2024-12-28 | End: 2024-12-30 | Stop reason: SDUPTHER

## 2024-12-28 RX ORDER — AZITHROMYCIN 250 MG/1
TABLET, FILM COATED ORAL
Qty: 6 TABLET | Refills: 0 | Status: SHIPPED | OUTPATIENT
Start: 2024-12-28 | End: 2025-01-07

## 2024-12-30 ENCOUNTER — TELEPHONE (OUTPATIENT)
Dept: FAMILY MEDICINE CLINIC | Age: 59
End: 2024-12-30

## 2024-12-30 DIAGNOSIS — G63 NEUROPATHY ASSOCIATED WITH CANCER (HCC): ICD-10-CM

## 2024-12-30 DIAGNOSIS — C50.511 MALIGNANT NEOPLASM OF LOWER-OUTER QUADRANT OF RIGHT BREAST OF FEMALE, ESTROGEN RECEPTOR POSITIVE (HCC): ICD-10-CM

## 2024-12-30 DIAGNOSIS — L27.1 CHEMOTHERAPY-INDUCED ACRAL ERYTHEMA: ICD-10-CM

## 2024-12-30 DIAGNOSIS — C80.1 NEUROPATHY ASSOCIATED WITH CANCER (HCC): ICD-10-CM

## 2024-12-30 DIAGNOSIS — Z17.0 MALIGNANT NEOPLASM OF LOWER-OUTER QUADRANT OF RIGHT BREAST OF FEMALE, ESTROGEN RECEPTOR POSITIVE (HCC): ICD-10-CM

## 2024-12-30 RX ORDER — OXYCODONE AND ACETAMINOPHEN 7.5; 325 MG/1; MG/1
1 TABLET ORAL 3 TIMES DAILY
Qty: 90 TABLET | Refills: 0 | Status: SHIPPED | OUTPATIENT
Start: 2024-12-30 | End: 2025-01-29

## 2024-12-30 NOTE — TELEPHONE ENCOUNTER
Patient called stating pharmacy does not have the refill for the percocets, verified with pharmacy, they do not have it needs sent in again. Please advise.         [id number] - [place], [place] - [address] - P [phone] - F [phone] [address] [place] [address]  Phone: [phone]  Fax: [phone]

## 2025-01-03 ENCOUNTER — PATIENT MESSAGE (OUTPATIENT)
Dept: FAMILY MEDICINE CLINIC | Age: 60
End: 2025-01-03

## 2025-01-03 DIAGNOSIS — G63 NEUROPATHY ASSOCIATED WITH CANCER (HCC): ICD-10-CM

## 2025-01-03 DIAGNOSIS — L27.1 CHEMOTHERAPY-INDUCED ACRAL ERYTHEMA: ICD-10-CM

## 2025-01-03 DIAGNOSIS — Z17.0 MALIGNANT NEOPLASM OF LOWER-OUTER QUADRANT OF RIGHT BREAST OF FEMALE, ESTROGEN RECEPTOR POSITIVE (HCC): Primary | ICD-10-CM

## 2025-01-03 DIAGNOSIS — C80.1 NEUROPATHY ASSOCIATED WITH CANCER (HCC): ICD-10-CM

## 2025-01-03 DIAGNOSIS — C50.511 MALIGNANT NEOPLASM OF LOWER-OUTER QUADRANT OF RIGHT BREAST OF FEMALE, ESTROGEN RECEPTOR POSITIVE (HCC): Primary | ICD-10-CM

## 2025-01-06 ENCOUNTER — PATIENT MESSAGE (OUTPATIENT)
Dept: FAMILY MEDICINE CLINIC | Age: 60
End: 2025-01-06

## 2025-01-06 RX ORDER — OXYCODONE AND ACETAMINOPHEN 7.5; 325 MG/1; MG/1
1 TABLET ORAL EVERY 8 HOURS PRN
Qty: 65 TABLET | Refills: 0 | Status: SHIPPED | OUTPATIENT
Start: 2025-01-06 | End: 2025-01-27

## 2025-01-16 ENCOUNTER — PATIENT MESSAGE (OUTPATIENT)
Dept: FAMILY MEDICINE CLINIC | Age: 60
End: 2025-01-16

## 2025-01-24 ENCOUNTER — PATIENT MESSAGE (OUTPATIENT)
Dept: FAMILY MEDICINE CLINIC | Age: 60
End: 2025-01-24

## 2025-01-26 ENCOUNTER — PATIENT MESSAGE (OUTPATIENT)
Dept: FAMILY MEDICINE CLINIC | Age: 60
End: 2025-01-26

## 2025-01-26 DIAGNOSIS — M25.562 PAIN IN LATERAL PORTION OF LEFT KNEE: Primary | ICD-10-CM

## 2025-01-28 RX ORDER — OXYCODONE AND ACETAMINOPHEN 10; 325 MG/1; MG/1
1 TABLET ORAL NIGHTLY PRN
Qty: 15 TABLET | Refills: 0 | Status: SHIPPED | OUTPATIENT
Start: 2025-01-28 | End: 2025-02-28

## 2025-01-30 ENCOUNTER — PATIENT MESSAGE (OUTPATIENT)
Dept: FAMILY MEDICINE CLINIC | Age: 60
End: 2025-01-30

## 2025-01-30 DIAGNOSIS — C50.511 MALIGNANT NEOPLASM OF LOWER-OUTER QUADRANT OF RIGHT BREAST OF FEMALE, ESTROGEN RECEPTOR POSITIVE (HCC): ICD-10-CM

## 2025-01-30 DIAGNOSIS — G63 NEUROPATHY ASSOCIATED WITH CANCER (HCC): ICD-10-CM

## 2025-01-30 DIAGNOSIS — C80.1 NEUROPATHY ASSOCIATED WITH CANCER (HCC): ICD-10-CM

## 2025-01-30 DIAGNOSIS — Z17.0 MALIGNANT NEOPLASM OF LOWER-OUTER QUADRANT OF RIGHT BREAST OF FEMALE, ESTROGEN RECEPTOR POSITIVE (HCC): ICD-10-CM

## 2025-01-30 DIAGNOSIS — L27.1 CHEMOTHERAPY-INDUCED ACRAL ERYTHEMA: ICD-10-CM

## 2025-01-30 RX ORDER — OXYCODONE AND ACETAMINOPHEN 7.5; 325 MG/1; MG/1
1 TABLET ORAL EVERY 8 HOURS PRN
Qty: 65 TABLET | Refills: 0 | Status: SHIPPED | OUTPATIENT
Start: 2025-01-30 | End: 2025-02-20

## 2025-02-04 ENCOUNTER — PATIENT MESSAGE (OUTPATIENT)
Dept: FAMILY MEDICINE CLINIC | Age: 60
End: 2025-02-04

## 2025-02-05 ENCOUNTER — TELEPHONE (OUTPATIENT)
Dept: FAMILY MEDICINE CLINIC | Age: 60
End: 2025-02-05

## 2025-02-05 DIAGNOSIS — G62.0 CHEMOTHERAPY-INDUCED PERIPHERAL NEUROPATHY (HCC): ICD-10-CM

## 2025-02-05 DIAGNOSIS — C80.1 NEUROPATHY ASSOCIATED WITH CANCER (HCC): ICD-10-CM

## 2025-02-05 DIAGNOSIS — G63 NEUROPATHY ASSOCIATED WITH CANCER (HCC): ICD-10-CM

## 2025-02-05 DIAGNOSIS — T45.1X5A CHEMOTHERAPY-INDUCED PERIPHERAL NEUROPATHY (HCC): ICD-10-CM

## 2025-02-05 DIAGNOSIS — L27.1 CHEMOTHERAPY-INDUCED ACRAL ERYTHEMA: Primary | ICD-10-CM

## 2025-02-11 ENCOUNTER — PATIENT MESSAGE (OUTPATIENT)
Dept: FAMILY MEDICINE CLINIC | Age: 60
End: 2025-02-11

## 2025-02-20 ENCOUNTER — OFFICE VISIT (OUTPATIENT)
Dept: FAMILY MEDICINE CLINIC | Age: 60
End: 2025-02-20
Payer: COMMERCIAL

## 2025-02-20 VITALS
BODY MASS INDEX: 32.96 KG/M2 | TEMPERATURE: 97.2 F | OXYGEN SATURATION: 99 % | WEIGHT: 186 LBS | SYSTOLIC BLOOD PRESSURE: 139 MMHG | HEART RATE: 116 BPM | DIASTOLIC BLOOD PRESSURE: 89 MMHG

## 2025-02-20 DIAGNOSIS — G62.0 CHEMOTHERAPY-INDUCED PERIPHERAL NEUROPATHY: ICD-10-CM

## 2025-02-20 DIAGNOSIS — C80.1 NEUROPATHY ASSOCIATED WITH CANCER (HCC): ICD-10-CM

## 2025-02-20 DIAGNOSIS — C50.511 MALIGNANT NEOPLASM OF LOWER-OUTER QUADRANT OF RIGHT BREAST OF FEMALE, ESTROGEN RECEPTOR POSITIVE (HCC): ICD-10-CM

## 2025-02-20 DIAGNOSIS — T45.1X5A CHEMOTHERAPY-INDUCED PERIPHERAL NEUROPATHY: ICD-10-CM

## 2025-02-20 DIAGNOSIS — M17.12 LOCALIZED OSTEOARTHRITIS OF LEFT KNEE: Primary | ICD-10-CM

## 2025-02-20 DIAGNOSIS — G63 NEUROPATHY ASSOCIATED WITH CANCER (HCC): ICD-10-CM

## 2025-02-20 DIAGNOSIS — Z17.0 MALIGNANT NEOPLASM OF LOWER-OUTER QUADRANT OF RIGHT BREAST OF FEMALE, ESTROGEN RECEPTOR POSITIVE (HCC): ICD-10-CM

## 2025-02-20 PROCEDURE — 99213 OFFICE O/P EST LOW 20 MIN: CPT | Performed by: FAMILY MEDICINE

## 2025-02-20 PROCEDURE — 3075F SYST BP GE 130 - 139MM HG: CPT | Performed by: FAMILY MEDICINE

## 2025-02-20 PROCEDURE — 3079F DIAST BP 80-89 MM HG: CPT | Performed by: FAMILY MEDICINE

## 2025-02-20 RX ORDER — CIPROFLOXACIN AND DEXAMETHASONE 3; 1 MG/ML; MG/ML
4 SUSPENSION/ DROPS AURICULAR (OTIC) 2 TIMES DAILY
Qty: 7.5 ML | Refills: 0 | Status: SHIPPED | OUTPATIENT
Start: 2025-02-20 | End: 2025-02-27

## 2025-02-20 RX ORDER — OXYCODONE HYDROCHLORIDE 5 MG/1
5 TABLET ORAL EVERY 6 HOURS PRN
Qty: 40 TABLET | Refills: 0 | Status: SHIPPED | OUTPATIENT
Start: 2025-02-20 | End: 2025-03-02

## 2025-02-20 SDOH — ECONOMIC STABILITY: FOOD INSECURITY: WITHIN THE PAST 12 MONTHS, YOU WORRIED THAT YOUR FOOD WOULD RUN OUT BEFORE YOU GOT MONEY TO BUY MORE.: NEVER TRUE

## 2025-02-20 SDOH — ECONOMIC STABILITY: FOOD INSECURITY: WITHIN THE PAST 12 MONTHS, THE FOOD YOU BOUGHT JUST DIDN'T LAST AND YOU DIDN'T HAVE MONEY TO GET MORE.: NEVER TRUE

## 2025-02-20 ASSESSMENT — PATIENT HEALTH QUESTIONNAIRE - PHQ9
SUM OF ALL RESPONSES TO PHQ QUESTIONS 1-9: 0
2. FEELING DOWN, DEPRESSED OR HOPELESS: NOT AT ALL
1. LITTLE INTEREST OR PLEASURE IN DOING THINGS: NOT AT ALL
SUM OF ALL RESPONSES TO PHQ QUESTIONS 1-9: 0
SUM OF ALL RESPONSES TO PHQ9 QUESTIONS 1 & 2: 0

## 2025-02-20 NOTE — PROGRESS NOTES
General FM note    Ruma Rascon is a 60 y.o. female who presents today for follow up on her  medical conditions as noted below.  Ruma Rascon is c/o of   Chief Complaint   Patient presents with    Pre-op Exam     3/27/25 Left Total Knee       Patient Active Problem List:     H/O severe sun exposure     Chronic tension-type headache, intractable     Essential hypertension     Microscopic hematuria     Lower abdominal pain     Malignant neoplasm of upper-outer quadrant of breast in female, estrogen receptor positive (HCC)     Chemotherapy induced diarrhea     Chemotherapy-induced acral erythema     Chemotherapy-induced peripheral neuropathy     Neuropathy associated with cancer (HCC)     Past Medical History:   Diagnosis Date    Anxiety     Asthma     Chemotherapy-induced peripheral neuropathy 9/28/2023    Depression     Headache     Hyperlipidemia     Hypertension     Malignant neoplasm of upper-outer quadrant of breast in female, estrogen receptor positive (HCC) 8/7/2018      Past Surgical History:   Procedure Laterality Date    BLADDER SURGERY  2000    BREAST SURGERY      cyst removed left breast    PARTIAL HYSTERECTOMY (CERVIX NOT REMOVED)      [place] [facility] CTR VAD W/SUBQ PORT AGE 4 YR/> Left 8/13/2018    PORT INSERTION WITH US AND FLUORO performed by Juan Gibson MD at [facility] OR    SALPINGO-OOPHORECTOMY Left     ectopic pregnancy treated by Dr. Torres    TONSILLECTOMY      TUNNELED VENOUS PORT PLACEMENT Left 08/13/2018    chemo port left chest     Family History   Problem Relation Age of Onset    Arthritis Mother         rheumatoid    Cancer Father         lung cancer    Other Father         circulatory issues    Breast Cancer Paternal Grandmother     Cancer Sister         skin cancer    Colon Cancer Other      Current Outpatient Medications   Medication Sig Dispense Refill    oxyCODONE (ROXICODONE) 5 MG immediate release tablet Take 1 tablet by mouth every 6 hours as needed for Pain for up to 10 days.

## 2025-02-27 ENCOUNTER — PATIENT MESSAGE (OUTPATIENT)
Dept: FAMILY MEDICINE CLINIC | Age: 60
End: 2025-02-27

## 2025-02-27 DIAGNOSIS — G63 NEUROPATHY ASSOCIATED WITH CANCER (HCC): ICD-10-CM

## 2025-02-27 DIAGNOSIS — Z17.0 MALIGNANT NEOPLASM OF LOWER-OUTER QUADRANT OF RIGHT BREAST OF FEMALE, ESTROGEN RECEPTOR POSITIVE (HCC): ICD-10-CM

## 2025-02-27 DIAGNOSIS — C50.511 MALIGNANT NEOPLASM OF LOWER-OUTER QUADRANT OF RIGHT BREAST OF FEMALE, ESTROGEN RECEPTOR POSITIVE (HCC): ICD-10-CM

## 2025-02-27 DIAGNOSIS — C80.1 NEUROPATHY ASSOCIATED WITH CANCER (HCC): ICD-10-CM

## 2025-02-27 DIAGNOSIS — L27.1 CHEMOTHERAPY-INDUCED ACRAL ERYTHEMA: ICD-10-CM

## 2025-02-28 RX ORDER — OXYCODONE AND ACETAMINOPHEN 7.5; 325 MG/1; MG/1
1 TABLET ORAL EVERY 8 HOURS PRN
Qty: 90 TABLET | Refills: 0 | Status: SHIPPED | OUTPATIENT
Start: 2025-02-28 | End: 2025-03-30

## 2025-03-03 RX ORDER — ATORVASTATIN CALCIUM 10 MG/1
TABLET, FILM COATED ORAL
Qty: 30 TABLET | Refills: 3 | Status: SHIPPED | OUTPATIENT
Start: 2025-03-03

## 2025-03-09 ENCOUNTER — PATIENT MESSAGE (OUTPATIENT)
Dept: FAMILY MEDICINE CLINIC | Age: 60
End: 2025-03-09

## 2025-03-24 ENCOUNTER — PATIENT MESSAGE (OUTPATIENT)
Dept: FAMILY MEDICINE CLINIC | Age: 60
End: 2025-03-24

## 2025-03-24 DIAGNOSIS — C50.511 MALIGNANT NEOPLASM OF LOWER-OUTER QUADRANT OF RIGHT BREAST OF FEMALE, ESTROGEN RECEPTOR POSITIVE: ICD-10-CM

## 2025-03-24 DIAGNOSIS — G63 NEUROPATHY ASSOCIATED WITH CANCER: ICD-10-CM

## 2025-03-24 DIAGNOSIS — C80.1 NEUROPATHY ASSOCIATED WITH CANCER: ICD-10-CM

## 2025-03-24 DIAGNOSIS — Z17.0 MALIGNANT NEOPLASM OF LOWER-OUTER QUADRANT OF RIGHT BREAST OF FEMALE, ESTROGEN RECEPTOR POSITIVE: ICD-10-CM

## 2025-03-24 DIAGNOSIS — L27.1 CHEMOTHERAPY-INDUCED ACRAL ERYTHEMA: ICD-10-CM

## 2025-03-25 RX ORDER — OXYCODONE AND ACETAMINOPHEN 7.5; 325 MG/1; MG/1
1 TABLET ORAL EVERY 8 HOURS PRN
Qty: 90 TABLET | Refills: 0 | Status: SHIPPED | OUTPATIENT
Start: 2025-03-25 | End: 2025-04-24

## 2025-04-10 DIAGNOSIS — Z17.0 MALIGNANT NEOPLASM OF LOWER-OUTER QUADRANT OF RIGHT BREAST OF FEMALE, ESTROGEN RECEPTOR POSITIVE: ICD-10-CM

## 2025-04-10 DIAGNOSIS — C50.511 MALIGNANT NEOPLASM OF LOWER-OUTER QUADRANT OF RIGHT BREAST OF FEMALE, ESTROGEN RECEPTOR POSITIVE: ICD-10-CM

## 2025-04-10 DIAGNOSIS — G44.221 CHRONIC TENSION-TYPE HEADACHE, INTRACTABLE: ICD-10-CM

## 2025-04-10 RX ORDER — DULOXETIN HYDROCHLORIDE 30 MG/1
30 CAPSULE, DELAYED RELEASE ORAL DAILY
Qty: 30 CAPSULE | Refills: 0 | Status: SHIPPED | OUTPATIENT
Start: 2025-04-10

## 2025-04-10 RX ORDER — DULOXETIN HYDROCHLORIDE 60 MG/1
60 CAPSULE, DELAYED RELEASE ORAL DAILY
Qty: 30 CAPSULE | Refills: 0 | Status: SHIPPED | OUTPATIENT
Start: 2025-04-10

## 2025-04-27 ENCOUNTER — PATIENT MESSAGE (OUTPATIENT)
Dept: FAMILY MEDICINE CLINIC | Age: 60
End: 2025-04-27

## 2025-04-27 DIAGNOSIS — C80.1 NEUROPATHY ASSOCIATED WITH CANCER (HCC): ICD-10-CM

## 2025-04-27 DIAGNOSIS — G62.0 CHEMOTHERAPY-INDUCED PERIPHERAL NEUROPATHY: ICD-10-CM

## 2025-04-27 DIAGNOSIS — G63 NEUROPATHY ASSOCIATED WITH CANCER (HCC): ICD-10-CM

## 2025-04-27 DIAGNOSIS — Z17.0 MALIGNANT NEOPLASM OF LOWER-OUTER QUADRANT OF RIGHT BREAST OF FEMALE, ESTROGEN RECEPTOR POSITIVE (HCC): Primary | ICD-10-CM

## 2025-04-27 DIAGNOSIS — C50.511 MALIGNANT NEOPLASM OF LOWER-OUTER QUADRANT OF RIGHT BREAST OF FEMALE, ESTROGEN RECEPTOR POSITIVE (HCC): Primary | ICD-10-CM

## 2025-04-27 DIAGNOSIS — L27.1 CHEMOTHERAPY-INDUCED ACRAL ERYTHEMA: ICD-10-CM

## 2025-04-27 DIAGNOSIS — M17.12 LOCALIZED OSTEOARTHRITIS OF LEFT KNEE: ICD-10-CM

## 2025-04-27 DIAGNOSIS — T45.1X5A CHEMOTHERAPY-INDUCED PERIPHERAL NEUROPATHY: ICD-10-CM

## 2025-04-28 RX ORDER — OXYCODONE AND ACETAMINOPHEN 7.5; 325 MG/1; MG/1
1 TABLET ORAL 3 TIMES DAILY
Qty: 90 TABLET | Refills: 0 | Status: SHIPPED | OUTPATIENT
Start: 2025-04-28 | End: 2025-05-28

## 2025-05-05 DIAGNOSIS — C50.511 MALIGNANT NEOPLASM OF LOWER-OUTER QUADRANT OF RIGHT BREAST OF FEMALE, ESTROGEN RECEPTOR POSITIVE (HCC): ICD-10-CM

## 2025-05-05 DIAGNOSIS — G44.221 CHRONIC TENSION-TYPE HEADACHE, INTRACTABLE: ICD-10-CM

## 2025-05-05 DIAGNOSIS — Z17.0 MALIGNANT NEOPLASM OF LOWER-OUTER QUADRANT OF RIGHT BREAST OF FEMALE, ESTROGEN RECEPTOR POSITIVE (HCC): ICD-10-CM

## 2025-05-05 RX ORDER — DULOXETIN HYDROCHLORIDE 30 MG/1
30 CAPSULE, DELAYED RELEASE ORAL DAILY
Qty: 30 CAPSULE | Refills: 0 | Status: SHIPPED | OUTPATIENT
Start: 2025-05-05

## 2025-05-05 RX ORDER — DULOXETIN HYDROCHLORIDE 60 MG/1
60 CAPSULE, DELAYED RELEASE ORAL DAILY
Qty: 30 CAPSULE | Refills: 0 | Status: SHIPPED | OUTPATIENT
Start: 2025-05-05

## 2025-05-05 NOTE — TELEPHONE ENCOUNTER
Ruma Rascon is calling to request a refill on the following medication(s):    Last Visit Date (If Applicable):  Visit date not found    Next Visit Date:    Visit date not found    Medication Request:  Requested Prescriptions     Pending Prescriptions Disp Refills    DULoxetine (CYMBALTA) 30 MG extended release capsule [Pharmacy Med Name: DULoxetine HCl Oral Capsule Delayed Release Particles 30 MG] 30 capsule 0     Sig: TAKE 1 CAPSULE BY MOUTH ONCE DAILY    DULoxetine (CYMBALTA) 60 MG extended release capsule [Pharmacy Med Name: DULoxetine HCl Oral Capsule Delayed Release Particles 60 MG] 30 capsule 0     Sig: TAKE 1 CAPSULE BY MOUTH ONCE DAILY

## 2025-05-22 DIAGNOSIS — L27.1 CHEMOTHERAPY-INDUCED ACRAL ERYTHEMA: ICD-10-CM

## 2025-05-22 DIAGNOSIS — C50.511 MALIGNANT NEOPLASM OF LOWER-OUTER QUADRANT OF RIGHT BREAST OF FEMALE, ESTROGEN RECEPTOR POSITIVE (HCC): ICD-10-CM

## 2025-05-22 DIAGNOSIS — Z17.0 MALIGNANT NEOPLASM OF LOWER-OUTER QUADRANT OF RIGHT BREAST OF FEMALE, ESTROGEN RECEPTOR POSITIVE (HCC): ICD-10-CM

## 2025-05-22 DIAGNOSIS — G63 NEUROPATHY ASSOCIATED WITH CANCER (HCC): ICD-10-CM

## 2025-05-22 DIAGNOSIS — C80.1 NEUROPATHY ASSOCIATED WITH CANCER (HCC): ICD-10-CM

## 2025-05-22 DIAGNOSIS — T45.1X5A CHEMOTHERAPY-INDUCED PERIPHERAL NEUROPATHY: ICD-10-CM

## 2025-05-22 DIAGNOSIS — G62.0 CHEMOTHERAPY-INDUCED PERIPHERAL NEUROPATHY: ICD-10-CM

## 2025-05-22 DIAGNOSIS — M17.12 LOCALIZED OSTEOARTHRITIS OF LEFT KNEE: ICD-10-CM

## 2025-05-22 RX ORDER — OXYCODONE AND ACETAMINOPHEN 7.5; 325 MG/1; MG/1
1 TABLET ORAL 3 TIMES DAILY
Qty: 90 TABLET | Refills: 0 | Status: SHIPPED | OUTPATIENT
Start: 2025-05-22 | End: 2025-06-21

## 2025-05-22 NOTE — TELEPHONE ENCOUNTER
Ruma Rascon is calling to request a refill on the following medication(s):    Last Visit Date (If Applicable):  2/20/2025    Next Visit Date:    Visit date not found    Medication Request:  Requested Prescriptions     Pending Prescriptions Disp Refills    oxyCODONE-acetaminophen (PERCOCET) 7.5-325 MG per tablet 90 tablet 0     Sig: Take 1 tablet by mouth in the morning, at noon, and at bedtime for 30 days. Intended supply: 14 days Max Daily Amount: 3 tablets

## 2025-06-01 DIAGNOSIS — C50.511 MALIGNANT NEOPLASM OF LOWER-OUTER QUADRANT OF RIGHT BREAST OF FEMALE, ESTROGEN RECEPTOR POSITIVE (HCC): ICD-10-CM

## 2025-06-01 DIAGNOSIS — G44.221 CHRONIC TENSION-TYPE HEADACHE, INTRACTABLE: ICD-10-CM

## 2025-06-01 DIAGNOSIS — Z17.0 MALIGNANT NEOPLASM OF LOWER-OUTER QUADRANT OF RIGHT BREAST OF FEMALE, ESTROGEN RECEPTOR POSITIVE (HCC): ICD-10-CM

## 2025-06-02 RX ORDER — DULOXETIN HYDROCHLORIDE 30 MG/1
CAPSULE, DELAYED RELEASE ORAL DAILY
Qty: 30 CAPSULE | Refills: 0 | Status: SHIPPED | OUTPATIENT
Start: 2025-06-02

## 2025-06-02 RX ORDER — DULOXETIN HYDROCHLORIDE 60 MG/1
CAPSULE, DELAYED RELEASE ORAL DAILY
Qty: 30 CAPSULE | Refills: 0 | Status: SHIPPED | OUTPATIENT
Start: 2025-06-02

## 2025-06-23 ENCOUNTER — PATIENT MESSAGE (OUTPATIENT)
Dept: FAMILY MEDICINE CLINIC | Age: 60
End: 2025-06-23

## 2025-06-23 DIAGNOSIS — C80.1 NEUROPATHY ASSOCIATED WITH CANCER (HCC): ICD-10-CM

## 2025-06-23 DIAGNOSIS — L27.1 CHEMOTHERAPY-INDUCED ACRAL ERYTHEMA: ICD-10-CM

## 2025-06-23 DIAGNOSIS — G62.0 CHEMOTHERAPY-INDUCED PERIPHERAL NEUROPATHY: ICD-10-CM

## 2025-06-23 DIAGNOSIS — T45.1X5A CHEMOTHERAPY-INDUCED PERIPHERAL NEUROPATHY: ICD-10-CM

## 2025-06-23 DIAGNOSIS — G63 NEUROPATHY ASSOCIATED WITH CANCER (HCC): ICD-10-CM

## 2025-06-23 DIAGNOSIS — C50.511 MALIGNANT NEOPLASM OF LOWER-OUTER QUADRANT OF RIGHT BREAST OF FEMALE, ESTROGEN RECEPTOR POSITIVE (HCC): Primary | ICD-10-CM

## 2025-06-23 DIAGNOSIS — Z17.0 MALIGNANT NEOPLASM OF LOWER-OUTER QUADRANT OF RIGHT BREAST OF FEMALE, ESTROGEN RECEPTOR POSITIVE (HCC): Primary | ICD-10-CM

## 2025-06-25 RX ORDER — OXYCODONE AND ACETAMINOPHEN 5; 325 MG/1; MG/1
1 TABLET ORAL 3 TIMES DAILY
Qty: 90 TABLET | Refills: 0 | Status: SHIPPED | OUTPATIENT
Start: 2025-06-25 | End: 2025-07-25

## 2025-06-29 DIAGNOSIS — Z17.0 MALIGNANT NEOPLASM OF LOWER-OUTER QUADRANT OF RIGHT BREAST OF FEMALE, ESTROGEN RECEPTOR POSITIVE (HCC): ICD-10-CM

## 2025-06-29 DIAGNOSIS — G44.221 CHRONIC TENSION-TYPE HEADACHE, INTRACTABLE: ICD-10-CM

## 2025-06-29 DIAGNOSIS — C50.511 MALIGNANT NEOPLASM OF LOWER-OUTER QUADRANT OF RIGHT BREAST OF FEMALE, ESTROGEN RECEPTOR POSITIVE (HCC): ICD-10-CM

## 2025-06-30 RX ORDER — DULOXETIN HYDROCHLORIDE 30 MG/1
CAPSULE, DELAYED RELEASE ORAL DAILY
Qty: 30 CAPSULE | Refills: 0 | Status: SHIPPED | OUTPATIENT
Start: 2025-06-30

## 2025-06-30 RX ORDER — ATORVASTATIN CALCIUM 10 MG/1
10 TABLET, FILM COATED ORAL DAILY
Qty: 30 TABLET | Refills: 3 | Status: SHIPPED | OUTPATIENT
Start: 2025-06-30

## 2025-06-30 RX ORDER — DULOXETIN HYDROCHLORIDE 60 MG/1
CAPSULE, DELAYED RELEASE ORAL DAILY
Qty: 30 CAPSULE | Refills: 0 | Status: SHIPPED | OUTPATIENT
Start: 2025-06-30

## 2025-06-30 NOTE — TELEPHONE ENCOUNTER
Ruma Rascon is calling to request a refill on the following medication(s):    Last Visit Date (If Applicable):  Visit date not found    Next Visit Date:    Visit date not found    Medication Request:  Requested Prescriptions     Pending Prescriptions Disp Refills    DULoxetine (CYMBALTA) 60 MG extended release capsule [Pharmacy Med Name: DULoxetine HCl Oral Capsule Delayed Release Particles 60 MG] 30 capsule 0     Sig: TAKE 1 CAPSULE BY MOUTH EVERY DAY    DULoxetine (CYMBALTA) 30 MG extended release capsule [Pharmacy Med Name: DULoxetine HCl Oral Capsule Delayed Release Particles 30 MG] 30 capsule 0     Sig: TAKE 1 CAPSULE BY MOUTH EVERY DAY

## 2025-07-08 ENCOUNTER — PATIENT MESSAGE (OUTPATIENT)
Dept: FAMILY MEDICINE CLINIC | Age: 60
End: 2025-07-08

## 2025-07-08 DIAGNOSIS — M17.12 LOCALIZED OSTEOARTHRITIS OF LEFT KNEE: ICD-10-CM

## 2025-07-08 DIAGNOSIS — L27.1 CHEMOTHERAPY-INDUCED ACRAL ERYTHEMA: ICD-10-CM

## 2025-07-08 DIAGNOSIS — G63 NEUROPATHY ASSOCIATED WITH CANCER (HCC): ICD-10-CM

## 2025-07-08 DIAGNOSIS — Z17.0 MALIGNANT NEOPLASM OF LOWER-OUTER QUADRANT OF RIGHT BREAST OF FEMALE, ESTROGEN RECEPTOR POSITIVE (HCC): ICD-10-CM

## 2025-07-08 DIAGNOSIS — C80.1 NEUROPATHY ASSOCIATED WITH CANCER (HCC): ICD-10-CM

## 2025-07-08 DIAGNOSIS — G62.0 CHEMOTHERAPY-INDUCED PERIPHERAL NEUROPATHY: ICD-10-CM

## 2025-07-08 DIAGNOSIS — T45.1X5A CHEMOTHERAPY-INDUCED PERIPHERAL NEUROPATHY: ICD-10-CM

## 2025-07-08 DIAGNOSIS — C50.511 MALIGNANT NEOPLASM OF LOWER-OUTER QUADRANT OF RIGHT BREAST OF FEMALE, ESTROGEN RECEPTOR POSITIVE (HCC): ICD-10-CM

## 2025-07-09 RX ORDER — OXYCODONE AND ACETAMINOPHEN 7.5; 325 MG/1; MG/1
1 TABLET ORAL 3 TIMES DAILY
Qty: 90 TABLET | Refills: 0 | Status: SHIPPED | OUTPATIENT
Start: 2025-07-09 | End: 2025-08-08

## 2025-07-16 ENCOUNTER — PATIENT MESSAGE (OUTPATIENT)
Dept: FAMILY MEDICINE CLINIC | Age: 60
End: 2025-07-16

## 2025-07-17 RX ORDER — POLYMYXIN B SULFATE AND TRIMETHOPRIM 1; 10000 MG/ML; [USP'U]/ML
1 SOLUTION OPHTHALMIC EVERY 6 HOURS
Qty: 10 ML | Refills: 0 | Status: SHIPPED | OUTPATIENT
Start: 2025-07-17 | End: 2025-07-27

## 2025-07-25 DIAGNOSIS — Z17.0 MALIGNANT NEOPLASM OF LOWER-OUTER QUADRANT OF RIGHT BREAST OF FEMALE, ESTROGEN RECEPTOR POSITIVE (HCC): ICD-10-CM

## 2025-07-25 DIAGNOSIS — G44.221 CHRONIC TENSION-TYPE HEADACHE, INTRACTABLE: ICD-10-CM

## 2025-07-25 DIAGNOSIS — C50.511 MALIGNANT NEOPLASM OF LOWER-OUTER QUADRANT OF RIGHT BREAST OF FEMALE, ESTROGEN RECEPTOR POSITIVE (HCC): ICD-10-CM

## 2025-07-25 NOTE — TELEPHONE ENCOUNTER
Ruma Rascon is calling to request a refill on the following medication(s):    Last Visit Date (If Applicable):  2/20/2025    Next Visit Date:    8/12/2025    Medication Request:  Requested Prescriptions     Pending Prescriptions Disp Refills    DULoxetine (CYMBALTA) 30 MG extended release capsule [Pharmacy Med Name: DULoxetine HCl Oral Capsule Delayed Release Particles 30 MG] 30 capsule 0     Sig: TAKE 1 CAPSULE BY MOUTH EVERY DAY    DULoxetine (CYMBALTA) 60 MG extended release capsule [Pharmacy Med Name: DULoxetine HCl Oral Capsule Delayed Release Particles 60 MG] 30 capsule 0     Sig: TAKE 1 CAPSULE BY MOUTH EVERY DAY

## 2025-07-26 RX ORDER — DULOXETIN HYDROCHLORIDE 60 MG/1
CAPSULE, DELAYED RELEASE ORAL DAILY
Qty: 30 CAPSULE | Refills: 3 | Status: SHIPPED | OUTPATIENT
Start: 2025-07-26

## 2025-07-26 RX ORDER — DULOXETIN HYDROCHLORIDE 30 MG/1
CAPSULE, DELAYED RELEASE ORAL DAILY
Qty: 30 CAPSULE | Refills: 3 | Status: SHIPPED | OUTPATIENT
Start: 2025-07-26

## 2025-08-07 ENCOUNTER — PATIENT MESSAGE (OUTPATIENT)
Dept: FAMILY MEDICINE CLINIC | Age: 60
End: 2025-08-07

## 2025-08-07 DIAGNOSIS — G63 NEUROPATHY ASSOCIATED WITH CANCER (HCC): ICD-10-CM

## 2025-08-07 DIAGNOSIS — Z17.0 MALIGNANT NEOPLASM OF LOWER-OUTER QUADRANT OF RIGHT BREAST OF FEMALE, ESTROGEN RECEPTOR POSITIVE (HCC): ICD-10-CM

## 2025-08-07 DIAGNOSIS — G62.0 CHEMOTHERAPY-INDUCED PERIPHERAL NEUROPATHY: ICD-10-CM

## 2025-08-07 DIAGNOSIS — T45.1X5A CHEMOTHERAPY-INDUCED PERIPHERAL NEUROPATHY: ICD-10-CM

## 2025-08-07 DIAGNOSIS — C50.511 MALIGNANT NEOPLASM OF LOWER-OUTER QUADRANT OF RIGHT BREAST OF FEMALE, ESTROGEN RECEPTOR POSITIVE (HCC): ICD-10-CM

## 2025-08-07 DIAGNOSIS — M17.12 LOCALIZED OSTEOARTHRITIS OF LEFT KNEE: ICD-10-CM

## 2025-08-07 DIAGNOSIS — C80.1 NEUROPATHY ASSOCIATED WITH CANCER (HCC): ICD-10-CM

## 2025-08-07 DIAGNOSIS — L27.1 CHEMOTHERAPY-INDUCED ACRAL ERYTHEMA: ICD-10-CM

## 2025-08-08 RX ORDER — OXYCODONE AND ACETAMINOPHEN 7.5; 325 MG/1; MG/1
1 TABLET ORAL 3 TIMES DAILY
Qty: 90 TABLET | Refills: 0 | Status: SHIPPED | OUTPATIENT
Start: 2025-08-08 | End: 2025-09-07

## 2025-08-12 ENCOUNTER — HOSPITAL ENCOUNTER (OUTPATIENT)
Age: 60
Setting detail: SPECIMEN
Discharge: HOME | End: 2025-08-12

## 2025-08-12 ENCOUNTER — OFFICE VISIT (OUTPATIENT)
Dept: FAMILY MEDICINE CLINIC | Age: 60
End: 2025-08-12
Payer: COMMERCIAL

## 2025-08-12 VITALS
BODY MASS INDEX: 33.67 KG/M2 | HEART RATE: 98 BPM | DIASTOLIC BLOOD PRESSURE: 88 MMHG | TEMPERATURE: 97.2 F | WEIGHT: 190 LBS | SYSTOLIC BLOOD PRESSURE: 137 MMHG | OXYGEN SATURATION: 100 %

## 2025-08-12 DIAGNOSIS — G62.0 CHEMOTHERAPY-INDUCED PERIPHERAL NEUROPATHY: ICD-10-CM

## 2025-08-12 DIAGNOSIS — T45.1X5A CHEMOTHERAPY-INDUCED PERIPHERAL NEUROPATHY: ICD-10-CM

## 2025-08-12 DIAGNOSIS — L27.1 CHEMOTHERAPY-INDUCED ACRAL ERYTHEMA: ICD-10-CM

## 2025-08-12 DIAGNOSIS — I10 PRIMARY HYPERTENSION: Primary | ICD-10-CM

## 2025-08-12 LAB
AMPHET UR QL SCN: NEGATIVE
BARBITURATES UR QL SCN: NEGATIVE
BENZODIAZ UR QL: NEGATIVE
CANNABINOIDS UR QL SCN: NEGATIVE
COCAINE UR QL SCN: NEGATIVE
FENTANYL UR QL: NEGATIVE
METHADONE UR QL: NEGATIVE
OPIATES UR QL SCN: NEGATIVE
OXYCODONE UR QL SCN: POSITIVE
PCP UR QL SCN: NEGATIVE
TEST INFORMATION: ABNORMAL

## 2025-08-12 PROCEDURE — 3079F DIAST BP 80-89 MM HG: CPT | Performed by: FAMILY MEDICINE

## 2025-08-12 PROCEDURE — 99213 OFFICE O/P EST LOW 20 MIN: CPT | Performed by: FAMILY MEDICINE

## 2025-08-12 PROCEDURE — 3075F SYST BP GE 130 - 139MM HG: CPT | Performed by: FAMILY MEDICINE

## 2025-08-12 RX ORDER — OLMESARTAN MEDOXOMIL 5 MG/1
5 TABLET ORAL DAILY
Qty: 30 TABLET | Refills: 5 | Status: SHIPPED | OUTPATIENT
Start: 2025-08-12

## 2025-08-12 ASSESSMENT — PATIENT HEALTH QUESTIONNAIRE - PHQ9
SUM OF ALL RESPONSES TO PHQ QUESTIONS 1-9: 0
1. LITTLE INTEREST OR PLEASURE IN DOING THINGS: NOT AT ALL
2. FEELING DOWN, DEPRESSED OR HOPELESS: NOT AT ALL
SUM OF ALL RESPONSES TO PHQ QUESTIONS 1-9: 0

## 2025-09-03 ENCOUNTER — TELEMEDICINE (OUTPATIENT)
Age: 60
End: 2025-09-03
Payer: COMMERCIAL

## 2025-09-03 DIAGNOSIS — R09.82 POST-NASAL DRIP: ICD-10-CM

## 2025-09-03 DIAGNOSIS — R05.1 ACUTE COUGH: Primary | ICD-10-CM

## 2025-09-03 DIAGNOSIS — J45.21 MILD INTERMITTENT ASTHMA WITH ACUTE EXACERBATION: ICD-10-CM

## 2025-09-03 PROCEDURE — 99213 OFFICE O/P EST LOW 20 MIN: CPT | Performed by: NURSE PRACTITIONER

## 2025-09-03 RX ORDER — FLUTICASONE PROPIONATE 50 MCG
2 SPRAY, SUSPENSION (ML) NASAL DAILY
Qty: 1 EACH | Refills: 1 | Status: SHIPPED | OUTPATIENT
Start: 2025-09-03

## 2025-09-03 RX ORDER — BENZONATATE 100 MG/1
100-200 CAPSULE ORAL 3 TIMES DAILY PRN
Qty: 30 CAPSULE | Refills: 0 | Status: SHIPPED | OUTPATIENT
Start: 2025-09-03 | End: 2025-09-10

## 2025-09-03 RX ORDER — ALBUTEROL SULFATE 90 UG/1
INHALANT RESPIRATORY (INHALATION)
Qty: 18 G | Refills: 1 | Status: SHIPPED | OUTPATIENT
Start: 2025-09-03

## 2025-09-03 RX ORDER — PREDNISONE 20 MG/1
TABLET ORAL
Qty: 20 TABLET | Refills: 0 | Status: SHIPPED | OUTPATIENT
Start: 2025-09-03

## 2025-09-03 ASSESSMENT — ENCOUNTER SYMPTOMS
SHORTNESS OF BREATH: 0
DIARRHEA: 0
CHEST TIGHTNESS: 1
TROUBLE SWALLOWING: 0
WHEEZING: 1
SORE THROAT: 1
NAUSEA: 0
SINUS PRESSURE: 1
COUGH: 1
VOMITING: 0

## 2025-09-04 ENCOUNTER — PATIENT MESSAGE (OUTPATIENT)
Dept: FAMILY MEDICINE CLINIC | Age: 60
End: 2025-09-04

## 2025-09-04 DIAGNOSIS — C80.1 NEUROPATHY ASSOCIATED WITH CANCER (HCC): ICD-10-CM

## 2025-09-04 DIAGNOSIS — Z17.0 MALIGNANT NEOPLASM OF LOWER-OUTER QUADRANT OF RIGHT BREAST OF FEMALE, ESTROGEN RECEPTOR POSITIVE (HCC): ICD-10-CM

## 2025-09-04 DIAGNOSIS — L27.1 CHEMOTHERAPY-INDUCED ACRAL ERYTHEMA: ICD-10-CM

## 2025-09-04 DIAGNOSIS — G63 NEUROPATHY ASSOCIATED WITH CANCER (HCC): ICD-10-CM

## 2025-09-04 DIAGNOSIS — G62.0 CHEMOTHERAPY-INDUCED PERIPHERAL NEUROPATHY: ICD-10-CM

## 2025-09-04 DIAGNOSIS — C50.511 MALIGNANT NEOPLASM OF LOWER-OUTER QUADRANT OF RIGHT BREAST OF FEMALE, ESTROGEN RECEPTOR POSITIVE (HCC): ICD-10-CM

## 2025-09-04 DIAGNOSIS — M17.12 LOCALIZED OSTEOARTHRITIS OF LEFT KNEE: ICD-10-CM

## 2025-09-04 DIAGNOSIS — L27.1 CHEMOTHERAPY-INDUCED ACRAL ERYTHEMA: Primary | ICD-10-CM

## 2025-09-04 DIAGNOSIS — T45.1X5A CHEMOTHERAPY-INDUCED PERIPHERAL NEUROPATHY: ICD-10-CM

## 2025-09-04 RX ORDER — OXYCODONE AND ACETAMINOPHEN 7.5; 325 MG/1; MG/1
1 TABLET ORAL 3 TIMES DAILY
Qty: 90 TABLET | Refills: 0 | Status: SHIPPED | OUTPATIENT
Start: 2025-09-04 | End: 2025-10-04

## 2025-09-04 RX ORDER — OXYCODONE HCL 10 MG/1
10 TABLET, FILM COATED, EXTENDED RELEASE ORAL EVERY 12 HOURS
Qty: 8 TABLET | Refills: 0 | Status: SHIPPED | OUTPATIENT
Start: 2025-09-04 | End: 2025-09-08

## 2025-09-04 RX ORDER — OXYCODONE HCL 10 MG/1
10 TABLET, FILM COATED, EXTENDED RELEASE ORAL DAILY
Qty: 8 TABLET | Refills: 0 | Status: SHIPPED | OUTPATIENT
Start: 2025-09-04 | End: 2025-09-04 | Stop reason: SDUPTHER

## (undated) DEVICE — GARMENT,MEDLINE,DVT,INT,CALF,MED, GEN2: Brand: MEDLINE

## (undated) DEVICE — DRAPE,REIN 53X77,STERILE: Brand: MEDLINE

## (undated) DEVICE — DRAPE THYROID

## (undated) DEVICE — MINOR BSIN PK

## (undated) DEVICE — YANKAUER,FLEXIBLE HANDLE,REGLR CAPACITY: Brand: MEDLINE INDUSTRIES, INC.

## (undated) DEVICE — GLOVE SURG SZ 6 L12IN FNGR THK87MIL WHT LTX FREE

## (undated) DEVICE — GLOVE SURG SZ 7 L12IN FNGR THK87MIL WHT LTX FREE

## (undated) DEVICE — DRAPE C ARM UNIV W41XL74IN CLR PLAS XR VELC CLSR POLY STRP

## (undated) DEVICE — SPONGE LAP W18XL18IN WHT COT 4 PLY FLD STRUNG RADPQ DISP ST

## (undated) DEVICE — 3M™ WARMING BLANKET, LOWER BODY, 10 PER CASE, 42568: Brand: BAIR HUGGER™

## (undated) DEVICE — DRESSING TRNSPAR W5XL4.5IN FLM SHT SEMIPERMEABLE WIND

## (undated) DEVICE — ADHESIVE SKIN CLSR 0.7ML TOP DERMBND ADV

## (undated) DEVICE — TOWEL,OR,DSP,ST,BLUE,STD,4/PK,20PK/CS: Brand: MEDLINE

## (undated) DEVICE — 20 ML SYRINGE LUER-LOCK TIP: Brand: MONOJECT

## (undated) DEVICE — GLOVE SURG SZ 75 L12IN FNGR THK87MIL WHT LTX FREE

## (undated) DEVICE — SOLUTION IV 250ML 0.9% SOD CHL PH 5 INJ USP VIAFLX PLAS

## (undated) DEVICE — GOWN,SIRUS,NON REINFRCD,LARGE,SET IN SL: Brand: MEDLINE

## (undated) DEVICE — SYRINGE, LUER LOCK, 10ML: Brand: MEDLINE

## (undated) DEVICE — ULTRACLEAN ACCESSORY ELECTRODE 1" (2.54 CM) COATED BLADE WITH EXTENDED INSULATION: Brand: ULTRACLEAN

## (undated) DEVICE — INTENDED FOR TISSUE SEPARATION, AND OTHER PROCEDURES THAT REQUIRE A SHARP SURGICAL BLADE TO PUNCTURE OR CUT.: Brand: BARD-PARKER ® CARBON RIB-BACK BLADES

## (undated) DEVICE — COVER US PRB W15XL120CM W/ GEL RUBBERBAND TAPE STRP FLD GEN

## (undated) DEVICE — TUBING, SUCTION, 1/4" X 12', STRAIGHT: Brand: MEDLINE

## (undated) DEVICE — SKIN PREP TRAY W/CHG: Brand: MEDLINE INDUSTRIES, INC.